# Patient Record
Sex: FEMALE | Race: WHITE | NOT HISPANIC OR LATINO | Employment: STUDENT | URBAN - METROPOLITAN AREA
[De-identification: names, ages, dates, MRNs, and addresses within clinical notes are randomized per-mention and may not be internally consistent; named-entity substitution may affect disease eponyms.]

---

## 2017-03-03 ENCOUNTER — APPOINTMENT (OUTPATIENT)
Dept: LAB | Facility: HOSPITAL | Age: 27
End: 2017-03-03

## 2017-03-03 ENCOUNTER — TRANSCRIBE ORDERS (OUTPATIENT)
Dept: LAB | Facility: HOSPITAL | Age: 27
End: 2017-03-03

## 2017-03-03 DIAGNOSIS — Z34.01 ENCOUNTER FOR SUPERVISION OF NORMAL FIRST PREGNANCY IN FIRST TRIMESTER: Primary | ICD-10-CM

## 2017-03-03 LAB
ABO GROUP BLD: NORMAL
AMPHET+METHAMPHET UR QL: NEGATIVE
AMPHETAMINES UR QL: NEGATIVE
BACTERIA UR QL AUTO: ABNORMAL /HPF
BARBITURATES UR QL SCN: NEGATIVE
BASOPHILS # BLD AUTO: 0.02 10*3/MM3 (ref 0–0.2)
BASOPHILS NFR BLD AUTO: 0.2 % (ref 0–1)
BENZODIAZ UR QL SCN: NEGATIVE
BILIRUB UR QL STRIP: NEGATIVE
BLD GP AB SCN SERPL QL: NEGATIVE
BUPRENORPHINE SERPL-MCNC: NEGATIVE NG/ML
CANNABINOIDS SERPL QL: NEGATIVE
CLARITY UR: ABNORMAL
COCAINE UR QL: NEGATIVE
COLOR UR: YELLOW
DEPRECATED RDW RBC AUTO: 39.7 FL (ref 37–54)
EOSINOPHIL # BLD AUTO: 0.04 10*3/MM3 (ref 0.1–0.3)
EOSINOPHIL NFR BLD AUTO: 0.4 % (ref 0–3)
ERYTHROCYTE [DISTWIDTH] IN BLOOD BY AUTOMATED COUNT: 12.5 % (ref 11.3–14.5)
GLUCOSE BLD-MCNC: 65 MG/DL (ref 70–100)
GLUCOSE UR STRIP-MCNC: NEGATIVE MG/DL
HBV SURFACE AG SERPL QL IA: NORMAL
HCT VFR BLD AUTO: 37.3 % (ref 34.5–44)
HCV AB SER DONR QL: NORMAL
HGB BLD-MCNC: 12.8 G/DL (ref 11.5–15.5)
HGB UR QL STRIP.AUTO: NEGATIVE
HIV1+2 AB SER QL: NORMAL
HYALINE CASTS UR QL AUTO: ABNORMAL /LPF
IMM GRANULOCYTES # BLD: 0.02 10*3/MM3 (ref 0–0.03)
IMM GRANULOCYTES NFR BLD: 0.2 % (ref 0–0.6)
KETONES UR QL STRIP: NEGATIVE
LEUKOCYTE ESTERASE UR QL STRIP.AUTO: NEGATIVE
LYMPHOCYTES # BLD AUTO: 1.79 10*3/MM3 (ref 0.6–4.8)
LYMPHOCYTES NFR BLD AUTO: 17.3 % (ref 24–44)
MCH RBC QN AUTO: 30.4 PG (ref 27–31)
MCHC RBC AUTO-ENTMCNC: 34.3 G/DL (ref 32–36)
MCV RBC AUTO: 88.6 FL (ref 80–99)
METHADONE UR QL SCN: NEGATIVE
MONOCYTES # BLD AUTO: 0.86 10*3/MM3 (ref 0–1)
MONOCYTES NFR BLD AUTO: 8.3 % (ref 0–12)
NEUTROPHILS # BLD AUTO: 7.59 10*3/MM3 (ref 1.5–8.3)
NEUTROPHILS NFR BLD AUTO: 73.6 % (ref 41–71)
NITRITE UR QL STRIP: NEGATIVE
OPIATES UR QL: NEGATIVE
OXYCODONE UR QL SCN: NEGATIVE
PCP UR QL SCN: NEGATIVE
PH UR STRIP.AUTO: 7 [PH] (ref 5–8)
PLATELET # BLD AUTO: 303 10*3/MM3 (ref 150–450)
PMV BLD AUTO: 11.2 FL (ref 6–12)
PROPOXYPH UR QL: NEGATIVE
PROT UR QL STRIP: NEGATIVE
RBC # BLD AUTO: 4.21 10*6/MM3 (ref 3.89–5.14)
RBC # UR: ABNORMAL /HPF
REF LAB TEST METHOD: ABNORMAL
RH BLD: POSITIVE
RUBV IGG SER QL: NORMAL
RUBV IGG SER-ACNC: 253.1 IU/ML
SP GR UR STRIP: 1.02 (ref 1–1.03)
SQUAMOUS #/AREA URNS HPF: ABNORMAL /HPF
TRICYCLICS UR QL SCN: NEGATIVE
UROBILINOGEN UR QL STRIP: ABNORMAL
WBC NRBC COR # BLD: 10.32 10*3/MM3 (ref 3.5–10.8)
WBC UR QL AUTO: ABNORMAL /HPF

## 2017-03-03 PROCEDURE — 81001 URINALYSIS AUTO W/SCOPE: CPT

## 2017-03-03 PROCEDURE — 86901 BLOOD TYPING SEROLOGIC RH(D): CPT | Performed by: ADVANCED PRACTICE MIDWIFE

## 2017-03-03 PROCEDURE — 36415 COLL VENOUS BLD VENIPUNCTURE: CPT

## 2017-03-03 PROCEDURE — 86900 BLOOD TYPING SEROLOGIC ABO: CPT | Performed by: ADVANCED PRACTICE MIDWIFE

## 2017-03-03 PROCEDURE — 86803 HEPATITIS C AB TEST: CPT

## 2017-03-03 PROCEDURE — 82947 ASSAY GLUCOSE BLOOD QUANT: CPT

## 2017-03-03 PROCEDURE — 87340 HEPATITIS B SURFACE AG IA: CPT

## 2017-03-03 PROCEDURE — 86850 RBC ANTIBODY SCREEN: CPT | Performed by: ADVANCED PRACTICE MIDWIFE

## 2017-03-03 PROCEDURE — G0432 EIA HIV-1/HIV-2 SCREEN: HCPCS

## 2017-03-03 PROCEDURE — 80306 DRUG TEST PRSMV INSTRMNT: CPT

## 2017-03-03 PROCEDURE — 85025 COMPLETE CBC W/AUTO DIFF WBC: CPT

## 2017-03-06 LAB — RPR SER QL: NORMAL

## 2017-04-05 ENCOUNTER — LAB REQUISITION (OUTPATIENT)
Dept: LAB | Facility: HOSPITAL | Age: 27
End: 2017-04-05

## 2017-04-05 DIAGNOSIS — O02.1 MISSED ABORTION: ICD-10-CM

## 2017-04-05 PROCEDURE — 88305 TISSUE EXAM BY PATHOLOGIST: CPT | Performed by: OBSTETRICS & GYNECOLOGY

## 2017-04-20 ENCOUNTER — APPOINTMENT (OUTPATIENT)
Dept: LAB | Facility: HOSPITAL | Age: 27
End: 2017-04-20

## 2017-04-20 ENCOUNTER — TRANSCRIBE ORDERS (OUTPATIENT)
Dept: LAB | Facility: HOSPITAL | Age: 27
End: 2017-04-20

## 2017-04-20 DIAGNOSIS — O02.1 MISSED ABORTION: Primary | ICD-10-CM

## 2017-04-20 LAB — HCG INTACT+B SERPL-ACNC: 64 MIU/ML

## 2017-04-20 PROCEDURE — 84702 CHORIONIC GONADOTROPIN TEST: CPT | Performed by: OBSTETRICS & GYNECOLOGY

## 2017-04-20 PROCEDURE — 36415 COLL VENOUS BLD VENIPUNCTURE: CPT | Performed by: OBSTETRICS & GYNECOLOGY

## 2017-04-28 ENCOUNTER — APPOINTMENT (OUTPATIENT)
Dept: LAB | Facility: HOSPITAL | Age: 27
End: 2017-04-28

## 2017-04-28 ENCOUNTER — TRANSCRIBE ORDERS (OUTPATIENT)
Dept: LAB | Facility: HOSPITAL | Age: 27
End: 2017-04-28

## 2017-04-28 DIAGNOSIS — O02.1 MISSED ABORTION: Primary | ICD-10-CM

## 2017-04-28 LAB — HCG INTACT+B SERPL-ACNC: 16 MIU/ML

## 2017-04-28 PROCEDURE — 84702 CHORIONIC GONADOTROPIN TEST: CPT | Performed by: OBSTETRICS & GYNECOLOGY

## 2017-04-28 PROCEDURE — 36415 COLL VENOUS BLD VENIPUNCTURE: CPT | Performed by: OBSTETRICS & GYNECOLOGY

## 2017-05-05 ENCOUNTER — TRANSCRIBE ORDERS (OUTPATIENT)
Dept: LAB | Facility: HOSPITAL | Age: 27
End: 2017-05-05

## 2017-05-05 ENCOUNTER — LAB (OUTPATIENT)
Dept: LAB | Facility: HOSPITAL | Age: 27
End: 2017-05-05

## 2017-05-05 DIAGNOSIS — O02.1 MISSED ABORTION: ICD-10-CM

## 2017-05-05 DIAGNOSIS — O02.1 MISSED ABORTION: Primary | ICD-10-CM

## 2017-05-05 LAB — HCG INTACT+B SERPL-ACNC: 6 MIU/ML

## 2017-05-05 PROCEDURE — 84702 CHORIONIC GONADOTROPIN TEST: CPT | Performed by: OBSTETRICS & GYNECOLOGY

## 2017-05-05 PROCEDURE — 36415 COLL VENOUS BLD VENIPUNCTURE: CPT

## 2017-05-22 LAB
LAB AP CASE REPORT: NORMAL
LAB AP CLINICAL INFORMATION: NORMAL
Lab: NORMAL
PATH REPORT.ADDENDUM SPEC: NORMAL
PATH REPORT.FINAL DX SPEC: NORMAL
PATH REPORT.GROSS SPEC: NORMAL

## 2017-06-16 ENCOUNTER — TRANSCRIBE ORDERS (OUTPATIENT)
Dept: LAB | Facility: HOSPITAL | Age: 27
End: 2017-06-16

## 2017-06-16 ENCOUNTER — LAB (OUTPATIENT)
Dept: LAB | Facility: HOSPITAL | Age: 27
End: 2017-06-16

## 2017-06-16 DIAGNOSIS — Z3A.08 8 WEEKS GESTATION OF PREGNANCY: ICD-10-CM

## 2017-06-16 DIAGNOSIS — Z3A.08 8 WEEKS GESTATION OF PREGNANCY: Primary | ICD-10-CM

## 2017-06-16 LAB
ABO GROUP BLD: NORMAL
AMPHET+METHAMPHET UR QL: NEGATIVE
AMPHETAMINES UR QL: NEGATIVE
BACTERIA UR QL AUTO: ABNORMAL /HPF
BARBITURATES UR QL SCN: NEGATIVE
BASOPHILS # BLD AUTO: 0.02 10*3/MM3 (ref 0–0.2)
BASOPHILS NFR BLD AUTO: 0.2 % (ref 0–1)
BENZODIAZ UR QL SCN: NEGATIVE
BILIRUB UR QL STRIP: NEGATIVE
BLD GP AB SCN SERPL QL: NEGATIVE
BUPRENORPHINE SERPL-MCNC: NEGATIVE NG/ML
CANNABINOIDS SERPL QL: NEGATIVE
CLARITY UR: CLEAR
COCAINE UR QL: NEGATIVE
COLOR UR: YELLOW
DEPRECATED RDW RBC AUTO: 39.7 FL (ref 37–54)
EOSINOPHIL # BLD AUTO: 0.04 10*3/MM3 (ref 0.1–0.3)
EOSINOPHIL NFR BLD AUTO: 0.4 % (ref 0–3)
ERYTHROCYTE [DISTWIDTH] IN BLOOD BY AUTOMATED COUNT: 12.1 % (ref 11.3–14.5)
GLUCOSE BLD-MCNC: 90 MG/DL (ref 70–100)
GLUCOSE UR STRIP-MCNC: NEGATIVE MG/DL
HBV SURFACE AG SERPL QL IA: NORMAL
HCG INTACT+B SERPL-ACNC: 595 MIU/ML
HCT VFR BLD AUTO: 40.3 % (ref 34.5–44)
HCV AB SER DONR QL: NORMAL
HGB BLD-MCNC: 13.2 G/DL (ref 11.5–15.5)
HGB UR QL STRIP.AUTO: NEGATIVE
HIV1+2 AB SER QL: NORMAL
HYALINE CASTS UR QL AUTO: ABNORMAL /LPF
IMM GRANULOCYTES # BLD: 0.02 10*3/MM3 (ref 0–0.03)
IMM GRANULOCYTES NFR BLD: 0.2 % (ref 0–0.6)
KETONES UR QL STRIP: NEGATIVE
LEUKOCYTE ESTERASE UR QL STRIP.AUTO: ABNORMAL
LYMPHOCYTES # BLD AUTO: 2.07 10*3/MM3 (ref 0.6–4.8)
LYMPHOCYTES NFR BLD AUTO: 18.9 % (ref 24–44)
MCH RBC QN AUTO: 29.5 PG (ref 27–31)
MCHC RBC AUTO-ENTMCNC: 32.8 G/DL (ref 32–36)
MCV RBC AUTO: 90 FL (ref 80–99)
METHADONE UR QL SCN: NEGATIVE
MONOCYTES # BLD AUTO: 0.82 10*3/MM3 (ref 0–1)
MONOCYTES NFR BLD AUTO: 7.5 % (ref 0–12)
NEUTROPHILS # BLD AUTO: 8.01 10*3/MM3 (ref 1.5–8.3)
NEUTROPHILS NFR BLD AUTO: 72.8 % (ref 41–71)
NITRITE UR QL STRIP: NEGATIVE
OPIATES UR QL: NEGATIVE
OXYCODONE UR QL SCN: NEGATIVE
PCP UR QL SCN: NEGATIVE
PH UR STRIP.AUTO: 7 [PH] (ref 5–8)
PLATELET # BLD AUTO: 275 10*3/MM3 (ref 150–450)
PMV BLD AUTO: 11.8 FL (ref 6–12)
PROGEST SERPL-MCNC: 8.61 NG/ML
PROPOXYPH UR QL: NEGATIVE
PROT UR QL STRIP: NEGATIVE
RBC # BLD AUTO: 4.48 10*6/MM3 (ref 3.89–5.14)
RBC # UR: ABNORMAL /HPF
REF LAB TEST METHOD: ABNORMAL
RH BLD: POSITIVE
RUBV IGG SER QL: NORMAL
RUBV IGG SER-ACNC: 190.5 IU/ML
SP GR UR STRIP: 1.01 (ref 1–1.03)
SQUAMOUS #/AREA URNS HPF: ABNORMAL /HPF
TRICYCLICS UR QL SCN: NEGATIVE
TSH SERPL DL<=0.05 MIU/L-ACNC: 1.28 MIU/ML (ref 0.35–5.35)
UROBILINOGEN UR QL STRIP: ABNORMAL
WBC NRBC COR # BLD: 10.98 10*3/MM3 (ref 3.5–10.8)
WBC UR QL AUTO: ABNORMAL /HPF

## 2017-06-16 PROCEDURE — 84702 CHORIONIC GONADOTROPIN TEST: CPT | Performed by: NURSE PRACTITIONER

## 2017-06-16 PROCEDURE — 87340 HEPATITIS B SURFACE AG IA: CPT | Performed by: NURSE PRACTITIONER

## 2017-06-16 PROCEDURE — 82947 ASSAY GLUCOSE BLOOD QUANT: CPT | Performed by: NURSE PRACTITIONER

## 2017-06-16 PROCEDURE — 86900 BLOOD TYPING SEROLOGIC ABO: CPT | Performed by: NURSE PRACTITIONER

## 2017-06-16 PROCEDURE — 80306 DRUG TEST PRSMV INSTRMNT: CPT | Performed by: NURSE PRACTITIONER

## 2017-06-16 PROCEDURE — 36415 COLL VENOUS BLD VENIPUNCTURE: CPT

## 2017-06-16 PROCEDURE — 86803 HEPATITIS C AB TEST: CPT | Performed by: NURSE PRACTITIONER

## 2017-06-16 PROCEDURE — 84443 ASSAY THYROID STIM HORMONE: CPT | Performed by: NURSE PRACTITIONER

## 2017-06-16 PROCEDURE — 80081 OBSTETRIC PANEL INC HIV TSTG: CPT | Performed by: NURSE PRACTITIONER

## 2017-06-16 PROCEDURE — 85025 COMPLETE CBC W/AUTO DIFF WBC: CPT | Performed by: NURSE PRACTITIONER

## 2017-06-16 PROCEDURE — 86901 BLOOD TYPING SEROLOGIC RH(D): CPT | Performed by: NURSE PRACTITIONER

## 2017-06-16 PROCEDURE — 81001 URINALYSIS AUTO W/SCOPE: CPT | Performed by: NURSE PRACTITIONER

## 2017-06-16 PROCEDURE — 86850 RBC ANTIBODY SCREEN: CPT | Performed by: NURSE PRACTITIONER

## 2017-06-16 PROCEDURE — 84144 ASSAY OF PROGESTERONE: CPT | Performed by: NURSE PRACTITIONER

## 2017-06-16 PROCEDURE — 87086 URINE CULTURE/COLONY COUNT: CPT | Performed by: NURSE PRACTITIONER

## 2017-06-16 PROCEDURE — G0432 EIA HIV-1/HIV-2 SCREEN: HCPCS | Performed by: NURSE PRACTITIONER

## 2017-06-18 ENCOUNTER — TRANSCRIBE ORDERS (OUTPATIENT)
Dept: LAB | Facility: HOSPITAL | Age: 27
End: 2017-06-18

## 2017-06-18 ENCOUNTER — APPOINTMENT (OUTPATIENT)
Dept: LAB | Facility: HOSPITAL | Age: 27
End: 2017-06-18

## 2017-06-18 DIAGNOSIS — Z00.00 ROUTINE GENERAL MEDICAL EXAMINATION AT A HEALTH CARE FACILITY: Primary | ICD-10-CM

## 2017-06-18 LAB
BACTERIA SPEC AEROBE CULT: NORMAL
HCG INTACT+B SERPL-ACNC: 1342 MIU/ML

## 2017-06-18 PROCEDURE — 84702 CHORIONIC GONADOTROPIN TEST: CPT | Performed by: NURSE PRACTITIONER

## 2017-06-18 PROCEDURE — 36415 COLL VENOUS BLD VENIPUNCTURE: CPT | Performed by: NURSE PRACTITIONER

## 2017-06-19 LAB — RPR SER QL: NORMAL

## 2017-08-02 ENCOUNTER — TRANSCRIBE ORDERS (OUTPATIENT)
Dept: WOMENS IMAGING | Facility: HOSPITAL | Age: 27
End: 2017-08-02

## 2017-08-02 DIAGNOSIS — O30.031 MONOCHORIONIC DIAMNIOTIC TWIN GESTATION IN FIRST TRIMESTER: ICD-10-CM

## 2017-08-02 DIAGNOSIS — IMO0001 TWINS: Primary | ICD-10-CM

## 2017-08-09 ENCOUNTER — OFFICE VISIT (OUTPATIENT)
Dept: OBSTETRICS AND GYNECOLOGY | Facility: HOSPITAL | Age: 27
End: 2017-08-09

## 2017-08-09 ENCOUNTER — HOSPITAL ENCOUNTER (OUTPATIENT)
Dept: WOMENS IMAGING | Facility: HOSPITAL | Age: 27
Discharge: HOME | End: 2017-08-09
Attending: OBSTETRICS & GYNECOLOGY | Admitting: OBSTETRICS & GYNECOLOGY

## 2017-08-09 VITALS
WEIGHT: 106 LBS | DIASTOLIC BLOOD PRESSURE: 72 MMHG | BODY MASS INDEX: 19.51 KG/M2 | SYSTOLIC BLOOD PRESSURE: 103 MMHG | HEIGHT: 62 IN

## 2017-08-09 DIAGNOSIS — O28.3 INCREASED NUCHAL TRANSLUCENCY SPACE ON FETAL ULTRASOUND: Primary | ICD-10-CM

## 2017-08-09 DIAGNOSIS — O30.039 MONOCHORIONIC DIAMNIOTIC TWIN PREGNANCY, ANTEPARTUM: ICD-10-CM

## 2017-08-09 DIAGNOSIS — IMO0001 TWINS: ICD-10-CM

## 2017-08-09 DIAGNOSIS — O30.031 MONOCHORIONIC DIAMNIOTIC TWIN GESTATION IN FIRST TRIMESTER: ICD-10-CM

## 2017-08-09 PROCEDURE — 76802 OB US < 14 WKS ADDL FETUS: CPT | Performed by: OBSTETRICS & GYNECOLOGY

## 2017-08-09 PROCEDURE — 76814 OB US NUCHAL MEAS ADD-ON: CPT

## 2017-08-09 PROCEDURE — 76813 OB US NUCHAL MEAS 1 GEST: CPT

## 2017-08-09 PROCEDURE — 99241 PR OFFICE CONSULTATION NEW/ESTAB PATIENT 15 MIN: CPT | Performed by: OBSTETRICS & GYNECOLOGY

## 2017-08-09 PROCEDURE — 76814 OB US NUCHAL MEAS ADD-ON: CPT | Performed by: OBSTETRICS & GYNECOLOGY

## 2017-08-09 PROCEDURE — 76801 OB US < 14 WKS SINGLE FETUS: CPT

## 2017-08-09 PROCEDURE — 76802 OB US < 14 WKS ADDL FETUS: CPT

## 2017-08-09 PROCEDURE — 76801 OB US < 14 WKS SINGLE FETUS: CPT | Performed by: OBSTETRICS & GYNECOLOGY

## 2017-08-09 PROCEDURE — 76813 OB US NUCHAL MEAS 1 GEST: CPT | Performed by: OBSTETRICS & GYNECOLOGY

## 2017-08-09 RX ORDER — PRENATAL WITH FERROUS FUM AND FOLIC ACID 3080; 920; 120; 400; 22; 1.84; 3; 20; 10; 1; 12; 200; 27; 25; 2 [IU]/1; [IU]/1; MG/1; [IU]/1; MG/1; MG/1; MG/1; MG/1; MG/1; MG/1; UG/1; MG/1; MG/1; MG/1; MG/1
TABLET ORAL DAILY
COMMUNITY
End: 2021-09-20

## 2017-09-06 ENCOUNTER — HOSPITAL ENCOUNTER (OUTPATIENT)
Dept: WOMENS IMAGING | Facility: HOSPITAL | Age: 27
Discharge: HOME | End: 2017-09-06
Attending: OBSTETRICS & GYNECOLOGY | Admitting: OBSTETRICS & GYNECOLOGY

## 2017-09-06 ENCOUNTER — OFFICE VISIT (OUTPATIENT)
Dept: OBSTETRICS AND GYNECOLOGY | Facility: HOSPITAL | Age: 27
End: 2017-09-06

## 2017-09-06 VITALS — BODY MASS INDEX: 19.9 KG/M2 | WEIGHT: 108.8 LBS | DIASTOLIC BLOOD PRESSURE: 67 MMHG | SYSTOLIC BLOOD PRESSURE: 107 MMHG

## 2017-09-06 DIAGNOSIS — O30.039 MONOCHORIONIC DIAMNIOTIC TWIN PREGNANCY, ANTEPARTUM: Primary | ICD-10-CM

## 2017-09-06 DIAGNOSIS — O28.3 INCREASED NUCHAL TRANSLUCENCY SPACE ON FETAL ULTRASOUND: ICD-10-CM

## 2017-09-06 DIAGNOSIS — Q07.9 CNS ANOMALY (HCC): ICD-10-CM

## 2017-09-06 DIAGNOSIS — O30.039 MONOCHORIONIC DIAMNIOTIC TWIN PREGNANCY, ANTEPARTUM: ICD-10-CM

## 2017-09-06 PROCEDURE — 99213 OFFICE O/P EST LOW 20 MIN: CPT | Performed by: OBSTETRICS & GYNECOLOGY

## 2017-09-06 PROCEDURE — 76816 OB US FOLLOW-UP PER FETUS: CPT

## 2017-09-06 PROCEDURE — 76816 OB US FOLLOW-UP PER FETUS: CPT | Performed by: OBSTETRICS & GYNECOLOGY

## 2017-09-06 NOTE — PROGRESS NOTES
Documentation of the ultrasound findings, images, and interpretations as well as consultation notes will be available in the patient's ViewPoint report located in the chart review imaging tab in [facility] [employer].

## 2017-09-06 NOTE — PROGRESS NOTES
Denies problems. To see Dr. Chavez 9/19/2017. Denies any bleeding; reports occasional mild cramping. Increased NT noted for Twin A on previous scan; declined genetic screening.

## 2017-10-04 ENCOUNTER — HOSPITAL ENCOUNTER (OUTPATIENT)
Dept: WOMENS IMAGING | Facility: HOSPITAL | Age: 27
Discharge: HOME | End: 2017-10-04
Attending: OBSTETRICS & GYNECOLOGY | Admitting: OBSTETRICS & GYNECOLOGY

## 2017-10-04 ENCOUNTER — OFFICE VISIT (OUTPATIENT)
Dept: OBSTETRICS AND GYNECOLOGY | Facility: HOSPITAL | Age: 27
End: 2017-10-04

## 2017-10-04 VITALS — DIASTOLIC BLOOD PRESSURE: 69 MMHG | BODY MASS INDEX: 20.67 KG/M2 | WEIGHT: 113 LBS | SYSTOLIC BLOOD PRESSURE: 110 MMHG

## 2017-10-04 DIAGNOSIS — O30.039 MONOCHORIONIC DIAMNIOTIC TWIN PREGNANCY, ANTEPARTUM: Primary | ICD-10-CM

## 2017-10-04 DIAGNOSIS — Q07.9 CNS ANOMALY (HCC): ICD-10-CM

## 2017-10-04 DIAGNOSIS — O30.039 MONOCHORIONIC DIAMNIOTIC TWIN PREGNANCY, ANTEPARTUM: ICD-10-CM

## 2017-10-04 DIAGNOSIS — O28.3 INCREASED NUCHAL TRANSLUCENCY SPACE ON FETAL ULTRASOUND: ICD-10-CM

## 2017-10-04 PROCEDURE — 76816 OB US FOLLOW-UP PER FETUS: CPT

## 2017-10-04 PROCEDURE — 76816 OB US FOLLOW-UP PER FETUS: CPT | Performed by: OBSTETRICS & GYNECOLOGY

## 2017-11-01 ENCOUNTER — HOSPITAL ENCOUNTER (OUTPATIENT)
Dept: WOMENS IMAGING | Facility: HOSPITAL | Age: 27
Discharge: HOME | End: 2017-11-01
Attending: OBSTETRICS & GYNECOLOGY | Admitting: OBSTETRICS & GYNECOLOGY

## 2017-11-01 ENCOUNTER — OFFICE VISIT (OUTPATIENT)
Dept: OBSTETRICS AND GYNECOLOGY | Facility: HOSPITAL | Age: 27
End: 2017-11-01

## 2017-11-01 VITALS — BODY MASS INDEX: 21.62 KG/M2 | WEIGHT: 118.2 LBS | SYSTOLIC BLOOD PRESSURE: 118 MMHG | DIASTOLIC BLOOD PRESSURE: 59 MMHG

## 2017-11-01 DIAGNOSIS — O28.3 INCREASED NUCHAL TRANSLUCENCY SPACE ON FETAL ULTRASOUND: ICD-10-CM

## 2017-11-01 DIAGNOSIS — Q07.9 CNS ANOMALY (HCC): ICD-10-CM

## 2017-11-01 DIAGNOSIS — O30.039 MONOCHORIONIC DIAMNIOTIC TWIN PREGNANCY, ANTEPARTUM: Primary | ICD-10-CM

## 2017-11-01 DIAGNOSIS — O30.039 MONOCHORIONIC DIAMNIOTIC TWIN PREGNANCY, ANTEPARTUM: ICD-10-CM

## 2017-11-01 PROCEDURE — 76825 ECHO EXAM OF FETAL HEART: CPT | Performed by: OBSTETRICS & GYNECOLOGY

## 2017-11-01 PROCEDURE — 93325 DOPPLER ECHO COLOR FLOW MAPG: CPT

## 2017-11-01 PROCEDURE — 76825 ECHO EXAM OF FETAL HEART: CPT

## 2017-11-01 PROCEDURE — 76816 OB US FOLLOW-UP PER FETUS: CPT | Performed by: OBSTETRICS & GYNECOLOGY

## 2017-11-01 PROCEDURE — 76816 OB US FOLLOW-UP PER FETUS: CPT

## 2017-11-01 PROCEDURE — 93325 DOPPLER ECHO COLOR FLOW MAPG: CPT | Performed by: OBSTETRICS & GYNECOLOGY

## 2017-11-29 ENCOUNTER — TRANSCRIBE ORDERS (OUTPATIENT)
Dept: LAB | Facility: HOSPITAL | Age: 27
End: 2017-11-29

## 2017-11-29 ENCOUNTER — LAB (OUTPATIENT)
Dept: LAB | Facility: HOSPITAL | Age: 27
End: 2017-11-29

## 2017-11-29 ENCOUNTER — OFFICE VISIT (OUTPATIENT)
Dept: OBSTETRICS AND GYNECOLOGY | Facility: HOSPITAL | Age: 27
End: 2017-11-29

## 2017-11-29 ENCOUNTER — HOSPITAL ENCOUNTER (OUTPATIENT)
Dept: WOMENS IMAGING | Facility: HOSPITAL | Age: 27
Discharge: HOME | End: 2017-11-29
Attending: OBSTETRICS & GYNECOLOGY | Admitting: OBSTETRICS & GYNECOLOGY

## 2017-11-29 VITALS — SYSTOLIC BLOOD PRESSURE: 100 MMHG | BODY MASS INDEX: 22.72 KG/M2 | DIASTOLIC BLOOD PRESSURE: 64 MMHG | WEIGHT: 124.2 LBS

## 2017-11-29 DIAGNOSIS — Z34.83 PRENATAL CARE, SUBSEQUENT PREGNANCY, THIRD TRIMESTER: ICD-10-CM

## 2017-11-29 DIAGNOSIS — O30.039 MONOCHORIONIC DIAMNIOTIC TWIN PREGNANCY, ANTEPARTUM: ICD-10-CM

## 2017-11-29 DIAGNOSIS — O28.3 INCREASED NUCHAL TRANSLUCENCY SPACE ON FETAL ULTRASOUND: ICD-10-CM

## 2017-11-29 DIAGNOSIS — O30.039 MONOCHORIONIC DIAMNIOTIC TWIN PREGNANCY, ANTEPARTUM: Primary | ICD-10-CM

## 2017-11-29 DIAGNOSIS — Z3A.28 28 WEEKS GESTATION OF PREGNANCY: ICD-10-CM

## 2017-11-29 DIAGNOSIS — Z3A.28 28 WEEKS GESTATION OF PREGNANCY: Primary | ICD-10-CM

## 2017-11-29 LAB
BLD GP AB SCN SERPL QL: NEGATIVE
DEPRECATED RDW RBC AUTO: 43 FL (ref 37–54)
ERYTHROCYTE [DISTWIDTH] IN BLOOD BY AUTOMATED COUNT: 13.2 % (ref 11.3–14.5)
GLUCOSE 1H P 100 G GLC PO SERPL-MCNC: 110 MG/DL (ref 65–199)
GLUCOSE BLDC GLUCOMTR-MCNC: 117 MG/DL
HCT VFR BLD AUTO: 35.2 % (ref 34.5–44)
HGB BLD-MCNC: 11.6 G/DL (ref 11.5–15.5)
MCH RBC QN AUTO: 29.6 PG (ref 27–31)
MCHC RBC AUTO-ENTMCNC: 33 G/DL (ref 32–36)
MCV RBC AUTO: 89.8 FL (ref 80–99)
PLATELET # BLD AUTO: 247 10*3/MM3 (ref 150–450)
PMV BLD AUTO: 11.5 FL (ref 6–12)
RBC # BLD AUTO: 3.92 10*6/MM3 (ref 3.89–5.14)
WBC NRBC COR # BLD: 11.69 10*3/MM3 (ref 3.5–10.8)

## 2017-11-29 PROCEDURE — 82962 GLUCOSE BLOOD TEST: CPT | Performed by: OBSTETRICS & GYNECOLOGY

## 2017-11-29 PROCEDURE — 36415 COLL VENOUS BLD VENIPUNCTURE: CPT

## 2017-11-29 PROCEDURE — 76816 OB US FOLLOW-UP PER FETUS: CPT | Performed by: OBSTETRICS & GYNECOLOGY

## 2017-11-29 PROCEDURE — 76816 OB US FOLLOW-UP PER FETUS: CPT

## 2017-11-29 PROCEDURE — 86850 RBC ANTIBODY SCREEN: CPT

## 2017-11-29 PROCEDURE — 82950 GLUCOSE TEST: CPT

## 2017-11-29 PROCEDURE — 85027 COMPLETE CBC AUTOMATED: CPT

## 2017-12-27 ENCOUNTER — APPOINTMENT (OUTPATIENT)
Dept: WOMENS IMAGING | Facility: HOSPITAL | Age: 27
End: 2017-12-27
Attending: OBSTETRICS & GYNECOLOGY

## 2017-12-28 ENCOUNTER — HOSPITAL ENCOUNTER (OUTPATIENT)
Dept: WOMENS IMAGING | Facility: HOSPITAL | Age: 27
Discharge: HOME | End: 2017-12-28
Attending: OBSTETRICS & GYNECOLOGY | Admitting: OBSTETRICS & GYNECOLOGY

## 2017-12-28 ENCOUNTER — APPOINTMENT (OUTPATIENT)
Dept: WOMENS IMAGING | Facility: HOSPITAL | Age: 27
End: 2017-12-28
Attending: OBSTETRICS & GYNECOLOGY

## 2017-12-28 ENCOUNTER — OFFICE VISIT (OUTPATIENT)
Dept: OBSTETRICS AND GYNECOLOGY | Facility: HOSPITAL | Age: 27
End: 2017-12-28

## 2017-12-28 VITALS — BODY MASS INDEX: 23.59 KG/M2 | DIASTOLIC BLOOD PRESSURE: 69 MMHG | SYSTOLIC BLOOD PRESSURE: 105 MMHG | WEIGHT: 129 LBS

## 2017-12-28 DIAGNOSIS — O28.3 INCREASED NUCHAL TRANSLUCENCY SPACE ON FETAL ULTRASOUND: ICD-10-CM

## 2017-12-28 DIAGNOSIS — O30.039 MONOCHORIONIC DIAMNIOTIC TWIN PREGNANCY, ANTEPARTUM: ICD-10-CM

## 2017-12-28 DIAGNOSIS — O30.039 MONOCHORIONIC DIAMNIOTIC TWIN PREGNANCY, ANTEPARTUM: Primary | ICD-10-CM

## 2017-12-28 PROCEDURE — 76816 OB US FOLLOW-UP PER FETUS: CPT

## 2017-12-28 PROCEDURE — 76816 OB US FOLLOW-UP PER FETUS: CPT | Performed by: OBSTETRICS & GYNECOLOGY

## 2017-12-28 RX ORDER — RANITIDINE HCL 75 MG
75 TABLET ORAL 2 TIMES DAILY
COMMUNITY
End: 2017-12-31 | Stop reason: HOSPADM

## 2017-12-28 RX ORDER — ACETAMINOPHEN 325 MG/1
650 TABLET ORAL EVERY 6 HOURS PRN
COMMUNITY
End: 2020-10-23 | Stop reason: HOSPADM

## 2017-12-28 NOTE — PROGRESS NOTES
Documentation of the ultasound findings, images, and interpretations with be available in the patient's Viewpoint report located in the Chart Review Imaging tab in [employer].

## 2017-12-28 NOTE — PROGRESS NOTES
"Pt has noticed an increased vag discharge with no odor/itching. Denies vag bleeding. Reports occasional \"round ligament pain\". Pt decided to not have genetic testing.   "

## 2017-12-29 ENCOUNTER — ANESTHESIA EVENT (OUTPATIENT)
Dept: LABOR AND DELIVERY | Facility: HOSPITAL | Age: 27
End: 2017-12-29

## 2017-12-29 ENCOUNTER — ANESTHESIA (OUTPATIENT)
Dept: LABOR AND DELIVERY | Facility: HOSPITAL | Age: 27
End: 2017-12-29

## 2017-12-29 ENCOUNTER — HOSPITAL ENCOUNTER (INPATIENT)
Facility: HOSPITAL | Age: 27
LOS: 2 days | Discharge: HOME | End: 2017-12-31
Attending: OBSTETRICS & GYNECOLOGY | Admitting: OBSTETRICS & GYNECOLOGY

## 2017-12-29 DIAGNOSIS — O30.039 MONOCHORIONIC DIAMNIOTIC TWIN PREGNANCY, ANTEPARTUM: ICD-10-CM

## 2017-12-29 DIAGNOSIS — Z3A.32 PREGNANCY WITH 32 COMPLETED WEEKS GESTATION: Primary | ICD-10-CM

## 2017-12-29 LAB
ABO GROUP BLD: NORMAL
ALP SERPL-CCNC: 225 U/L (ref 25–100)
ALT SERPL W P-5'-P-CCNC: 18 U/L (ref 7–40)
AST SERPL-CCNC: 40 U/L (ref 0–33)
ATMOSPHERIC PRESS: ABNORMAL MMHG
ATMOSPHERIC PRESS: ABNORMAL MMHG
BASE EXCESS BLDCOA CALC-SCNC: -5.9 MMOL/L (ref 0–2)
BASE EXCESS BLDCOV CALC-SCNC: -5.8 MMOL/L (ref 0–2)
BDY SITE: ABNORMAL
BILIRUB SERPL-MCNC: 0.6 MG/DL (ref 0.3–1.2)
BLD GP AB SCN SERPL QL: NEGATIVE
CO2 BLDA-SCNC: 23.2 MMOL/L (ref 22–33)
CO2 BLDA-SCNC: 23.3 MMOL/L (ref 22–33)
CREAT BLD-MCNC: 0.9 MG/DL (ref 0.6–1.3)
DEPRECATED RDW RBC AUTO: 43.3 FL (ref 37–54)
ERYTHROCYTE [DISTWIDTH] IN BLOOD BY AUTOMATED COUNT: 13.4 % (ref 11.3–14.5)
FLUAV AG NPH QL: POSITIVE
FLUBV AG NPH QL IA: NEGATIVE
HCO3 BLDCOA-SCNC: 21.7 MMOL/L (ref 16.9–20.5)
HCO3 BLDCOV-SCNC: 21.7 MMOL/L (ref 18.6–21.4)
HCT VFR BLD AUTO: 34.4 % (ref 34.5–44)
HGB BLD-MCNC: 11.7 G/DL (ref 11.5–15.5)
HGB BLDA-MCNC: 13.5 G/DL (ref 14–18)
HGB BLDA-MCNC: 15.3 G/DL (ref 14–18)
HOROWITZ INDEX BLD+IHG-RTO: 21 %
HOROWITZ INDEX BLD+IHG-RTO: 21 %
LDH SERPL-CCNC: 411 U/L (ref 120–246)
MCH RBC QN AUTO: 30.3 PG (ref 27–31)
MCHC RBC AUTO-ENTMCNC: 34 G/DL (ref 32–36)
MCV RBC AUTO: 89.1 FL (ref 80–99)
MODALITY: ABNORMAL
MODALITY: ABNORMAL
PCO2 BLDCOA: 50 MMHG (ref 43.3–54.9)
PCO2 BLDCOV: 48.9 MM HG (ref 30–60)
PH BLDCOA: 7.25 PH UNITS (ref 7.2–7.3)
PH BLDCOV: 7.26 PH UNITS (ref 7.19–7.46)
PLATELET # BLD AUTO: 204 10*3/MM3 (ref 150–450)
PMV BLD AUTO: 12.8 FL (ref 6–12)
PO2 BLDCOA: 23.3 MMHG (ref 11.5–43.3)
PO2 BLDCOV: 21.9 MM HG
RBC # BLD AUTO: 3.86 10*6/MM3 (ref 3.89–5.14)
RH BLD: POSITIVE
SAO2 % BLDCOA: 47.6 %
SAO2 % BLDCOA: ABNORMAL % (ref 45–75)
SAO2 % BLDCOV: 42.3 %
URATE SERPL-MCNC: 8.7 MG/DL (ref 3.1–7.8)
WBC NRBC COR # BLD: 9.52 10*3/MM3 (ref 3.5–10.8)

## 2017-12-29 PROCEDURE — 84460 ALANINE AMINO (ALT) (SGPT): CPT | Performed by: OBSTETRICS & GYNECOLOGY

## 2017-12-29 PROCEDURE — 25010000002 FENTANYL CITRATE (PF) 100 MCG/2ML SOLUTION: Performed by: NURSE ANESTHETIST, CERTIFIED REGISTERED

## 2017-12-29 PROCEDURE — 25010000003 CEFAZOLIN IN DEXTROSE 2-4 GM/100ML-% SOLUTION: Performed by: OBSTETRICS & GYNECOLOGY

## 2017-12-29 PROCEDURE — 87081 CULTURE SCREEN ONLY: CPT | Performed by: OBSTETRICS & GYNECOLOGY

## 2017-12-29 PROCEDURE — 84550 ASSAY OF BLOOD/URIC ACID: CPT | Performed by: OBSTETRICS & GYNECOLOGY

## 2017-12-29 PROCEDURE — 25010000002 PHENYLEPHRINE PER 1 ML: Performed by: NURSE ANESTHETIST, CERTIFIED REGISTERED

## 2017-12-29 PROCEDURE — 25010000003 MORPHINE PER 10 MG: Performed by: NURSE ANESTHETIST, CERTIFIED REGISTERED

## 2017-12-29 PROCEDURE — 59025 FETAL NON-STRESS TEST: CPT | Performed by: OBSTETRICS & GYNECOLOGY

## 2017-12-29 PROCEDURE — 25010000002 BETAMETHASONE ACET & SOD PHOS PER 4 MG: Performed by: OBSTETRICS & GYNECOLOGY

## 2017-12-29 PROCEDURE — 87804 INFLUENZA ASSAY W/OPTIC: CPT | Performed by: OBSTETRICS & GYNECOLOGY

## 2017-12-29 PROCEDURE — 25010000002 ONDANSETRON PER 1 MG: Performed by: OBSTETRICS & GYNECOLOGY

## 2017-12-29 PROCEDURE — 84450 TRANSFERASE (AST) (SGOT): CPT | Performed by: OBSTETRICS & GYNECOLOGY

## 2017-12-29 PROCEDURE — 82805 BLOOD GASES W/O2 SATURATION: CPT | Performed by: OBSTETRICS & GYNECOLOGY

## 2017-12-29 PROCEDURE — 25010000002 MAGNESIUM SULFATE-LACT RINGERS 40 GM/580ML SOLUTION: Performed by: OBSTETRICS & GYNECOLOGY

## 2017-12-29 PROCEDURE — 82247 BILIRUBIN TOTAL: CPT | Performed by: OBSTETRICS & GYNECOLOGY

## 2017-12-29 PROCEDURE — 59025 FETAL NON-STRESS TEST: CPT

## 2017-12-29 PROCEDURE — 86900 BLOOD TYPING SEROLOGIC ABO: CPT | Performed by: OBSTETRICS & GYNECOLOGY

## 2017-12-29 PROCEDURE — 25010000002 METOCLOPRAMIDE PER 10 MG: Performed by: NURSE ANESTHETIST, CERTIFIED REGISTERED

## 2017-12-29 PROCEDURE — 86901 BLOOD TYPING SEROLOGIC RH(D): CPT | Performed by: OBSTETRICS & GYNECOLOGY

## 2017-12-29 PROCEDURE — 99222 1ST HOSP IP/OBS MODERATE 55: CPT | Performed by: OBSTETRICS & GYNECOLOGY

## 2017-12-29 PROCEDURE — 83615 LACTATE (LD) (LDH) ENZYME: CPT | Performed by: OBSTETRICS & GYNECOLOGY

## 2017-12-29 PROCEDURE — 99253 IP/OBS CNSLTJ NEW/EST LOW 45: CPT | Performed by: OBSTETRICS & GYNECOLOGY

## 2017-12-29 PROCEDURE — 25010000002 METOCLOPRAMIDE PER 10 MG: Performed by: OBSTETRICS & GYNECOLOGY

## 2017-12-29 PROCEDURE — 25010000002 MIDAZOLAM PER 1 MG: Performed by: NURSE ANESTHETIST, CERTIFIED REGISTERED

## 2017-12-29 PROCEDURE — 84075 ASSAY ALKALINE PHOSPHATASE: CPT | Performed by: OBSTETRICS & GYNECOLOGY

## 2017-12-29 PROCEDURE — 86850 RBC ANTIBODY SCREEN: CPT | Performed by: OBSTETRICS & GYNECOLOGY

## 2017-12-29 PROCEDURE — 88307 TISSUE EXAM BY PATHOLOGIST: CPT | Performed by: OBSTETRICS & GYNECOLOGY

## 2017-12-29 PROCEDURE — 82565 ASSAY OF CREATININE: CPT | Performed by: OBSTETRICS & GYNECOLOGY

## 2017-12-29 PROCEDURE — 85027 COMPLETE CBC AUTOMATED: CPT | Performed by: OBSTETRICS & GYNECOLOGY

## 2017-12-29 RX ORDER — ONDANSETRON 2 MG/ML
4 INJECTION INTRAMUSCULAR; INTRAVENOUS EVERY 8 HOURS PRN
Status: DISCONTINUED | OUTPATIENT
Start: 2017-12-29 | End: 2017-12-29 | Stop reason: HOSPADM

## 2017-12-29 RX ORDER — HYDROCODONE BITARTRATE AND ACETAMINOPHEN 5; 325 MG/1; MG/1
1 TABLET ORAL EVERY 4 HOURS PRN
Status: DISCONTINUED | OUTPATIENT
Start: 2017-12-29 | End: 2017-12-31 | Stop reason: HOSPADM

## 2017-12-29 RX ORDER — OSELTAMIVIR PHOSPHATE 75 MG/1
75 CAPSULE ORAL EVERY 12 HOURS SCHEDULED
Status: DISCONTINUED | OUTPATIENT
Start: 2017-12-29 | End: 2017-12-31 | Stop reason: HOSPADM

## 2017-12-29 RX ORDER — ONDANSETRON 2 MG/ML
4 INJECTION INTRAMUSCULAR; INTRAVENOUS EVERY 6 HOURS PRN
Status: DISCONTINUED | OUTPATIENT
Start: 2017-12-29 | End: 2017-12-31 | Stop reason: HOSPADM

## 2017-12-29 RX ORDER — NALOXONE HCL 0.4 MG/ML
0.4 VIAL (ML) INJECTION
Status: ACTIVE | OUTPATIENT
Start: 2017-12-29 | End: 2017-12-30

## 2017-12-29 RX ORDER — IBUPROFEN 600 MG/1
600 TABLET ORAL EVERY 6 HOURS PRN
Status: DISCONTINUED | OUTPATIENT
Start: 2017-12-29 | End: 2017-12-31 | Stop reason: HOSPADM

## 2017-12-29 RX ORDER — DIPHENHYDRAMINE HYDROCHLORIDE 50 MG/ML
25 INJECTION INTRAMUSCULAR; INTRAVENOUS EVERY 4 HOURS PRN
Status: DISCONTINUED | OUTPATIENT
Start: 2017-12-29 | End: 2017-12-31 | Stop reason: HOSPADM

## 2017-12-29 RX ORDER — ONDANSETRON 2 MG/ML
4 INJECTION INTRAMUSCULAR; INTRAVENOUS ONCE AS NEEDED
Status: DISCONTINUED | OUTPATIENT
Start: 2017-12-29 | End: 2017-12-31 | Stop reason: HOSPADM

## 2017-12-29 RX ORDER — DIPHENHYDRAMINE HCL 25 MG
25 CAPSULE ORAL EVERY 4 HOURS PRN
Status: DISCONTINUED | OUTPATIENT
Start: 2017-12-29 | End: 2017-12-31 | Stop reason: HOSPADM

## 2017-12-29 RX ORDER — MIDAZOLAM HYDROCHLORIDE 1 MG/ML
INJECTION INTRAMUSCULAR; INTRAVENOUS AS NEEDED
Status: DISCONTINUED | OUTPATIENT
Start: 2017-12-29 | End: 2017-12-29 | Stop reason: SURG

## 2017-12-29 RX ORDER — PRENATAL VIT/IRON FUM/FOLIC AC 27MG-0.8MG
1 TABLET ORAL DAILY
Status: DISCONTINUED | OUTPATIENT
Start: 2017-12-29 | End: 2017-12-31 | Stop reason: HOSPADM

## 2017-12-29 RX ORDER — FAMOTIDINE 20 MG/1
20 TABLET, FILM COATED ORAL 2 TIMES DAILY
Status: DISCONTINUED | OUTPATIENT
Start: 2017-12-29 | End: 2017-12-29 | Stop reason: HOSPADM

## 2017-12-29 RX ORDER — OXYCODONE AND ACETAMINOPHEN 7.5; 325 MG/1; MG/1
1 TABLET ORAL EVERY 4 HOURS PRN
Status: DISCONTINUED | OUTPATIENT
Start: 2017-12-29 | End: 2017-12-31 | Stop reason: HOSPADM

## 2017-12-29 RX ORDER — CEFAZOLIN SODIUM 2 G/100ML
2 INJECTION, SOLUTION INTRAVENOUS ONCE
Status: COMPLETED | OUTPATIENT
Start: 2017-12-29 | End: 2017-12-29

## 2017-12-29 RX ORDER — LIDOCAINE HYDROCHLORIDE 10 MG/ML
5 INJECTION, SOLUTION INFILTRATION; PERINEURAL AS NEEDED
Status: DISCONTINUED | OUTPATIENT
Start: 2017-12-29 | End: 2017-12-29 | Stop reason: HOSPADM

## 2017-12-29 RX ORDER — ACETAMINOPHEN 325 MG/1
650 TABLET ORAL EVERY 4 HOURS PRN
Status: DISCONTINUED | OUTPATIENT
Start: 2017-12-29 | End: 2017-12-31 | Stop reason: HOSPADM

## 2017-12-29 RX ORDER — NALOXONE HCL 0.4 MG/ML
0.4 VIAL (ML) INJECTION
Status: DISCONTINUED | OUTPATIENT
Start: 2017-12-29 | End: 2017-12-29 | Stop reason: HOSPADM

## 2017-12-29 RX ORDER — BETAMETHASONE SODIUM PHOSPHATE AND BETAMETHASONE ACETATE 3; 3 MG/ML; MG/ML
12 INJECTION, SUSPENSION INTRA-ARTICULAR; INTRALESIONAL; INTRAMUSCULAR; SOFT TISSUE EVERY 24 HOURS
Status: DISCONTINUED | OUTPATIENT
Start: 2017-12-29 | End: 2017-12-29 | Stop reason: HOSPADM

## 2017-12-29 RX ORDER — DEXTROSE AND SODIUM CHLORIDE 5; .2 G/100ML; G/100ML
100 INJECTION, SOLUTION INTRAVENOUS CONTINUOUS
Status: DISCONTINUED | OUTPATIENT
Start: 2017-12-29 | End: 2017-12-30

## 2017-12-29 RX ORDER — SODIUM CHLORIDE, SODIUM LACTATE, POTASSIUM CHLORIDE, CALCIUM CHLORIDE 600; 310; 30; 20 MG/100ML; MG/100ML; MG/100ML; MG/100ML
125 INJECTION, SOLUTION INTRAVENOUS CONTINUOUS
Status: DISCONTINUED | OUTPATIENT
Start: 2017-12-29 | End: 2017-12-29

## 2017-12-29 RX ORDER — ZOLPIDEM TARTRATE 5 MG/1
5 TABLET ORAL NIGHTLY PRN
Status: DISCONTINUED | OUTPATIENT
Start: 2017-12-29 | End: 2017-12-29 | Stop reason: HOSPADM

## 2017-12-29 RX ORDER — FAMOTIDINE 10 MG/ML
INJECTION, SOLUTION INTRAVENOUS AS NEEDED
Status: DISCONTINUED | OUTPATIENT
Start: 2017-12-29 | End: 2017-12-29 | Stop reason: SURG

## 2017-12-29 RX ORDER — SODIUM CHLORIDE 0.9 % (FLUSH) 0.9 %
1-10 SYRINGE (ML) INJECTION AS NEEDED
Status: DISCONTINUED | OUTPATIENT
Start: 2017-12-29 | End: 2017-12-31 | Stop reason: HOSPADM

## 2017-12-29 RX ORDER — AZITHROMYCIN 250 MG/1
1000 TABLET, FILM COATED ORAL ONCE
Status: DISCONTINUED | OUTPATIENT
Start: 2017-12-29 | End: 2017-12-29

## 2017-12-29 RX ORDER — METOCLOPRAMIDE HYDROCHLORIDE 5 MG/ML
INJECTION INTRAMUSCULAR; INTRAVENOUS AS NEEDED
Status: DISCONTINUED | OUTPATIENT
Start: 2017-12-29 | End: 2017-12-29 | Stop reason: SURG

## 2017-12-29 RX ORDER — ACETAMINOPHEN 500 MG
1000 TABLET ORAL EVERY 4 HOURS PRN
Status: DISCONTINUED | OUTPATIENT
Start: 2017-12-29 | End: 2017-12-29 | Stop reason: HOSPADM

## 2017-12-29 RX ORDER — TRISODIUM CITRATE DIHYDRATE AND CITRIC ACID MONOHYDRATE 500; 334 MG/5ML; MG/5ML
30 SOLUTION ORAL ONCE
Status: COMPLETED | OUTPATIENT
Start: 2017-12-29 | End: 2017-12-29

## 2017-12-29 RX ORDER — SIMETHICONE 80 MG
80 TABLET,CHEWABLE ORAL 4 TIMES DAILY PRN
Status: DISCONTINUED | OUTPATIENT
Start: 2017-12-29 | End: 2017-12-31 | Stop reason: HOSPADM

## 2017-12-29 RX ORDER — METOCLOPRAMIDE HYDROCHLORIDE 5 MG/ML
10 INJECTION INTRAMUSCULAR; INTRAVENOUS EVERY 6 HOURS PRN
Status: DISCONTINUED | OUTPATIENT
Start: 2017-12-29 | End: 2017-12-31 | Stop reason: HOSPADM

## 2017-12-29 RX ORDER — DOCUSATE SODIUM 100 MG/1
100 CAPSULE, LIQUID FILLED ORAL 2 TIMES DAILY PRN
Status: DISCONTINUED | OUTPATIENT
Start: 2017-12-29 | End: 2017-12-31 | Stop reason: HOSPADM

## 2017-12-29 RX ORDER — FENTANYL CITRATE 50 UG/ML
INJECTION, SOLUTION INTRAMUSCULAR; INTRAVENOUS AS NEEDED
Status: DISCONTINUED | OUTPATIENT
Start: 2017-12-29 | End: 2017-12-29 | Stop reason: SURG

## 2017-12-29 RX ORDER — OXYTOCIN 10 [USP'U]/ML
INJECTION, SOLUTION INTRAMUSCULAR; INTRAVENOUS AS NEEDED
Status: DISCONTINUED | OUTPATIENT
Start: 2017-12-29 | End: 2017-12-29 | Stop reason: SURG

## 2017-12-29 RX ORDER — SODIUM CHLORIDE 0.9 % (FLUSH) 0.9 %
1-10 SYRINGE (ML) INJECTION AS NEEDED
Status: DISCONTINUED | OUTPATIENT
Start: 2017-12-29 | End: 2017-12-29 | Stop reason: HOSPADM

## 2017-12-29 RX ORDER — OXYTOCIN-SODIUM CHLORIDE 0.9% IV SOLN 30 UNIT/500ML 30-0.9/5 UT/ML-%
2-24 SOLUTION INTRAVENOUS
Status: DISCONTINUED | OUTPATIENT
Start: 2017-12-29 | End: 2017-12-29

## 2017-12-29 RX ORDER — ONDANSETRON 4 MG/1
4 TABLET, FILM COATED ORAL EVERY 6 HOURS PRN
Status: DISCONTINUED | OUTPATIENT
Start: 2017-12-29 | End: 2017-12-31 | Stop reason: HOSPADM

## 2017-12-29 RX ORDER — MORPHINE SULFATE 0.5 MG/ML
INJECTION, SOLUTION EPIDURAL; INTRATHECAL; INTRAVENOUS AS NEEDED
Status: DISCONTINUED | OUTPATIENT
Start: 2017-12-29 | End: 2017-12-29 | Stop reason: SURG

## 2017-12-29 RX ORDER — BUPIVACAINE HYDROCHLORIDE 7.5 MG/ML
INJECTION, SOLUTION EPIDURAL; RETROBULBAR AS NEEDED
Status: DISCONTINUED | OUTPATIENT
Start: 2017-12-29 | End: 2017-12-29 | Stop reason: SURG

## 2017-12-29 RX ORDER — BISACODYL 10 MG
10 SUPPOSITORY, RECTAL RECTAL DAILY PRN
Status: DISCONTINUED | OUTPATIENT
Start: 2017-12-30 | End: 2017-12-31 | Stop reason: HOSPADM

## 2017-12-29 RX ORDER — MAGNESIUM SULF/RINGERS LACTATE 40 G/500ML
3 PLASTIC BAG, INJECTION (ML) INTRAVENOUS CONTINUOUS
Status: DISCONTINUED | OUTPATIENT
Start: 2017-12-29 | End: 2017-12-29

## 2017-12-29 RX ORDER — IBUPROFEN 600 MG/1
600 TABLET ORAL EVERY 6 HOURS PRN
Status: COMPLETED | OUTPATIENT
Start: 2017-12-29 | End: 2017-12-30

## 2017-12-29 RX ADMIN — CEFAZOLIN SODIUM 2 G: 2 INJECTION, SOLUTION INTRAVENOUS at 08:50

## 2017-12-29 RX ADMIN — MIDAZOLAM HYDROCHLORIDE 1 MG: 1 INJECTION, SOLUTION INTRAMUSCULAR; INTRAVENOUS at 09:06

## 2017-12-29 RX ADMIN — SODIUM CHLORIDE, POTASSIUM CHLORIDE, SODIUM LACTATE AND CALCIUM CHLORIDE 1000 ML: 600; 310; 30; 20 INJECTION, SOLUTION INTRAVENOUS at 08:26

## 2017-12-29 RX ADMIN — SODIUM CHLORIDE, POTASSIUM CHLORIDE, SODIUM LACTATE AND CALCIUM CHLORIDE: 600; 310; 30; 20 INJECTION, SOLUTION INTRAVENOUS at 09:51

## 2017-12-29 RX ADMIN — ONDANSETRON 4 MG: 2 INJECTION INTRAMUSCULAR; INTRAVENOUS at 13:45

## 2017-12-29 RX ADMIN — SODIUM CHLORIDE, POTASSIUM CHLORIDE, SODIUM LACTATE AND CALCIUM CHLORIDE 1000 ML/HR: 600; 310; 30; 20 INJECTION, SOLUTION INTRAVENOUS at 08:50

## 2017-12-29 RX ADMIN — OSELTAMIVIR PHOSPHATE 75 MG: 75 CAPSULE ORAL at 15:12

## 2017-12-29 RX ADMIN — OXYTOCIN 2 MILLI-UNITS/MIN: 10 INJECTION INTRAVENOUS at 09:55

## 2017-12-29 RX ADMIN — Medication 3 G/HR: at 06:31

## 2017-12-29 RX ADMIN — METOCLOPRAMIDE 10 MG: 5 INJECTION, SOLUTION INTRAMUSCULAR; INTRAVENOUS at 16:25

## 2017-12-29 RX ADMIN — MORPHINE SULFATE 0.15 MG: 0.5 INJECTION, SOLUTION EPIDURAL; INTRATHECAL; INTRAVENOUS at 09:11

## 2017-12-29 RX ADMIN — BENZOCAINE AND MENTHOL: 20; .5 SPRAY TOPICAL at 13:45

## 2017-12-29 RX ADMIN — CEFAZOLIN SODIUM 2 G: 2 INJECTION, SOLUTION INTRAVENOUS at 06:24

## 2017-12-29 RX ADMIN — FAMOTIDINE 20 MG: 10 INJECTION, SOLUTION INTRAVENOUS at 09:23

## 2017-12-29 RX ADMIN — WITCH HAZEL 1 PAD: 500 SOLUTION RECTAL; TOPICAL at 13:45

## 2017-12-29 RX ADMIN — BETAMETHASONE SODIUM PHOSPHATE AND BETAMETHASONE ACETATE 12 MG: 3; 3 INJECTION, SUSPENSION INTRA-ARTICULAR; INTRALESIONAL; INTRAMUSCULAR at 06:23

## 2017-12-29 RX ADMIN — SODIUM CITRATE AND CITRIC ACID MONOHYDRATE 30 ML: 500; 334 SOLUTION ORAL at 09:00

## 2017-12-29 RX ADMIN — METOCLOPRAMIDE 10 MG: 5 INJECTION, SOLUTION INTRAMUSCULAR; INTRAVENOUS at 09:23

## 2017-12-29 RX ADMIN — IBUPROFEN 600 MG: 600 TABLET, FILM COATED ORAL at 21:42

## 2017-12-29 RX ADMIN — PHENYLEPHRINE HYDROCHLORIDE 100 MCG: 10 INJECTION INTRAVENOUS at 09:21

## 2017-12-29 RX ADMIN — SODIUM CHLORIDE, POTASSIUM CHLORIDE, SODIUM LACTATE AND CALCIUM CHLORIDE: 600; 310; 30; 20 INJECTION, SOLUTION INTRAVENOUS at 09:16

## 2017-12-29 RX ADMIN — BUPIVACAINE HYDROCHLORIDE 1.4 ML: 7.5 INJECTION, SOLUTION EPIDURAL; RETROBULBAR at 09:11

## 2017-12-29 RX ADMIN — FENTANYL CITRATE 15 MCG: 50 INJECTION, SOLUTION INTRAMUSCULAR; INTRAVENOUS at 09:11

## 2017-12-29 RX ADMIN — DEXTROSE AND SODIUM CHLORIDE 100 ML/HR: 5; 200 INJECTION, SOLUTION INTRAVENOUS at 06:06

## 2017-12-29 RX ADMIN — OXYTOCIN 30 UNITS: 10 INJECTION, SOLUTION INTRAMUSCULAR; INTRAVENOUS at 10:03

## 2017-12-29 RX ADMIN — PHENYLEPHRINE HYDROCHLORIDE 100 MCG: 10 INJECTION INTRAVENOUS at 09:30

## 2017-12-29 RX ADMIN — HYDROCORTISONE 2.5% 1 APPLICATION: 25 CREAM TOPICAL at 13:45

## 2017-12-29 RX ADMIN — PHENYLEPHRINE HYDROCHLORIDE 100 MCG: 10 INJECTION INTRAVENOUS at 09:23

## 2017-12-29 RX ADMIN — ONDANSETRON 4 MG: 2 INJECTION INTRAMUSCULAR; INTRAVENOUS at 06:07

## 2017-12-29 NOTE — ANESTHESIA PROCEDURE NOTES
Spinal Block    Indication:procedure for pain  Performed By  Anesthesiologist: J CARLOS LLOYD  CRNA: VIDAL REDMAN  Preanesthetic Checklist  Completed: patient identified, surgical consent, pre-op evaluation, timeout performed, IV checked, risks and benefits discussed and monitors and equipment checked  Spinal Block Prep:  Patient Position:sitting  Sterile Tech:cap, gloves, mask and sterile barriers  Prep:Betadine  Patient Monitoring:blood pressure monitoring, continuous pulse oximetry and EKG  Spinal Block Procedure  Approach:midline  Guidance:palpation technique  Location:L3-L4  Needle Type:Prerna  Needle Gauge:25 G  Placement of Spinal needle event:cerebrospinal fluid aspirated  Paresthesia: no  Fluid Appearance:clear  Post Assessment  Patient Tolerance:patient tolerated the procedure well with no apparent complications  Complications no

## 2017-12-29 NOTE — ANESTHESIA POSTPROCEDURE EVALUATION
Patient: Amie Sepulveda    Procedure Summary     Date Anesthesia Start Anesthesia Stop Room / Location    17 0905 1013 [facility] / [facility]       Procedure Diagnosis Surgeon Provider     SECTION PRIMARY (N/A Abdomen) No diagnosis on file. MD Agustin Henley MD          Anesthesia Type: spinal, ITN  Last vitals  BP   97/62   Temp 97.6   Pulse 98   Resp 16   SpO2 100%     Post Anesthesia Care and Evaluation    Patient location during evaluation: bedside  Patient participation: complete - patient participated  Level of consciousness: awake and alert  Pain score: 0  Pain management: adequate  Airway patency: patent  Anesthetic complications: No anesthetic complications    Cardiovascular status: acceptable  Respiratory status: acceptable  Hydration status: acceptable

## 2017-12-29 NOTE — ANESTHESIA PREPROCEDURE EVALUATION
Anesthesia Evaluation     Patient summary reviewed and Nursing notes reviewed   no history of anesthetic complications:  NPO Solid Status: > 8 hours  NPO Liquid Status: > 2 hours     Airway   Mallampati: II  TM distance: >3 FB  Neck ROM: full  no difficulty expected  Dental      Pulmonary - negative pulmonary ROS     ROS comment: Positive for Flu  Cardiovascular - negative cardio ROS        Neuro/Psych  (+) psychiatric history Anxiety and Depression,     GI/Hepatic/Renal/Endo      Musculoskeletal (-) negative ROS    Abdominal    Substance History - negative use     OB/GYN    (+) Pregnant,         Other - negative ROS                                             Anesthesia Plan    ASA 3     spinal and ITN     Anesthetic plan and risks discussed with patient.

## 2017-12-30 LAB
HCT VFR BLD AUTO: 32.2 % (ref 34.5–44)
HGB BLD-MCNC: 10.9 G/DL (ref 11.5–15.5)

## 2017-12-30 PROCEDURE — 85014 HEMATOCRIT: CPT | Performed by: OBSTETRICS & GYNECOLOGY

## 2017-12-30 PROCEDURE — 85018 HEMOGLOBIN: CPT | Performed by: OBSTETRICS & GYNECOLOGY

## 2017-12-30 RX ADMIN — IBUPROFEN 600 MG: 600 TABLET, FILM COATED ORAL at 03:41

## 2017-12-30 RX ADMIN — OSELTAMIVIR PHOSPHATE 75 MG: 75 CAPSULE ORAL at 18:34

## 2017-12-30 RX ADMIN — OSELTAMIVIR PHOSPHATE 75 MG: 75 CAPSULE ORAL at 03:41

## 2017-12-30 RX ADMIN — IBUPROFEN 600 MG: 600 TABLET, FILM COATED ORAL at 18:25

## 2017-12-30 RX ADMIN — GUAIFENESIN 200 MG: 200 SOLUTION ORAL at 23:12

## 2017-12-31 VITALS
HEART RATE: 82 BPM | DIASTOLIC BLOOD PRESSURE: 72 MMHG | TEMPERATURE: 97.8 F | HEIGHT: 63 IN | BODY MASS INDEX: 22.86 KG/M2 | WEIGHT: 129 LBS | SYSTOLIC BLOOD PRESSURE: 111 MMHG | OXYGEN SATURATION: 97 % | RESPIRATION RATE: 16 BRPM

## 2017-12-31 PROBLEM — O30.039 MONOCHORIONIC DIAMNIOTIC TWIN PREGNANCY, ANTEPARTUM: Status: RESOLVED | Noted: 2017-08-09 | Resolved: 2017-12-31

## 2017-12-31 PROBLEM — Q07.9: Status: RESOLVED | Noted: 2017-09-06 | Resolved: 2017-12-31

## 2017-12-31 PROBLEM — O28.3 INCREASED NUCHAL TRANSLUCENCY SPACE ON FETAL ULTRASOUND: Status: RESOLVED | Noted: 2017-08-09 | Resolved: 2017-12-31

## 2017-12-31 RX ORDER — LANOLIN 100 %
OINTMENT (GRAM) TOPICAL AS NEEDED
Status: DISCONTINUED | OUTPATIENT
Start: 2017-12-31 | End: 2017-12-31 | Stop reason: HOSPADM

## 2017-12-31 RX ORDER — HYDROCODONE BITARTRATE AND ACETAMINOPHEN 5; 325 MG/1; MG/1
1 TABLET ORAL EVERY 6 HOURS PRN
Qty: 10 TABLET | Refills: 0 | Status: SHIPPED | OUTPATIENT
Start: 2017-12-31 | End: 2018-01-08

## 2017-12-31 RX ORDER — IBUPROFEN 600 MG/1
600 TABLET ORAL EVERY 6 HOURS PRN
Qty: 60 TABLET | Refills: 1 | Status: SHIPPED | OUTPATIENT
Start: 2017-12-31 | End: 2020-10-23 | Stop reason: HOSPADM

## 2017-12-31 RX ADMIN — IBUPROFEN 600 MG: 600 TABLET, FILM COATED ORAL at 06:48

## 2017-12-31 RX ADMIN — GUAIFENESIN 200 MG: 200 SOLUTION ORAL at 06:44

## 2017-12-31 RX ADMIN — OSELTAMIVIR PHOSPHATE 75 MG: 75 CAPSULE ORAL at 06:44

## 2017-12-31 RX ADMIN — DOCUSATE SODIUM 100 MG: 100 CAPSULE, LIQUID FILLED ORAL at 07:49

## 2017-12-31 RX ADMIN — PRENATAL VIT W/ FE FUMARATE-FA TAB 27-0.8 MG 1 TABLET: 27-0.8 TAB at 07:48

## 2018-01-01 LAB — BACTERIA SPEC AEROBE CULT: NORMAL

## 2018-01-03 LAB
CYTO UR: NORMAL
LAB AP CASE REPORT: NORMAL
LAB AP CLINICAL INFORMATION: NORMAL
Lab: NORMAL
PATH REPORT.FINAL DX SPEC: NORMAL
PATH REPORT.GROSS SPEC: NORMAL

## 2018-01-25 ENCOUNTER — APPOINTMENT (OUTPATIENT)
Dept: WOMENS IMAGING | Facility: HOSPITAL | Age: 28
End: 2018-01-25
Attending: OBSTETRICS & GYNECOLOGY

## 2019-03-12 ENCOUNTER — LAB (OUTPATIENT)
Dept: LAB | Facility: HOSPITAL | Age: 29
End: 2019-03-12

## 2019-03-12 ENCOUNTER — TRANSCRIBE ORDERS (OUTPATIENT)
Dept: LAB | Facility: HOSPITAL | Age: 29
End: 2019-03-12

## 2019-03-12 DIAGNOSIS — F41.1 GENERALIZED ANXIETY DISORDER: ICD-10-CM

## 2019-03-12 DIAGNOSIS — F41.1 GENERALIZED ANXIETY DISORDER: Primary | ICD-10-CM

## 2019-03-12 DIAGNOSIS — F01.53 VASCULAR DEMENTIA WITH DEPRESSED MOOD (HCC): ICD-10-CM

## 2019-03-12 LAB
ALBUMIN SERPL-MCNC: 4.96 G/DL (ref 3.2–4.8)
ALBUMIN/GLOB SERPL: 2 G/DL (ref 1.5–2.5)
ALP SERPL-CCNC: 93 U/L (ref 25–100)
ALT SERPL W P-5'-P-CCNC: 23 U/L (ref 7–40)
ANION GAP SERPL CALCULATED.3IONS-SCNC: 14 MMOL/L (ref 3–11)
AST SERPL-CCNC: 21 U/L (ref 0–33)
BILIRUB SERPL-MCNC: 3.6 MG/DL (ref 0.3–1.2)
BUN BLD-MCNC: 9 MG/DL (ref 9–23)
BUN/CREAT SERPL: 13.4 (ref 7–25)
CALCIUM SPEC-SCNC: 9.8 MG/DL (ref 8.7–10.4)
CHLORIDE SERPL-SCNC: 101 MMOL/L (ref 99–109)
CO2 SERPL-SCNC: 22 MMOL/L (ref 20–31)
CREAT BLD-MCNC: 0.67 MG/DL (ref 0.6–1.3)
DEPRECATED RDW RBC AUTO: 40 FL (ref 37–54)
ERYTHROCYTE [DISTWIDTH] IN BLOOD BY AUTOMATED COUNT: 12.4 % (ref 11.3–14.5)
GFR SERPL CREATININE-BSD FRML MDRD: 103 ML/MIN/1.73
GLOBULIN UR ELPH-MCNC: 2.5 GM/DL
GLUCOSE BLD-MCNC: 118 MG/DL (ref 70–100)
HCT VFR BLD AUTO: 42 % (ref 34.5–44)
HGB BLD-MCNC: 13.9 G/DL (ref 11.5–15.5)
MCH RBC QN AUTO: 29.7 PG (ref 27–31)
MCHC RBC AUTO-ENTMCNC: 33.1 G/DL (ref 32–36)
MCV RBC AUTO: 89.7 FL (ref 80–99)
PLATELET # BLD AUTO: 349 10*3/MM3 (ref 150–450)
PMV BLD AUTO: 11.7 FL (ref 6–12)
POTASSIUM BLD-SCNC: 4.1 MMOL/L (ref 3.5–5.5)
PROT SERPL-MCNC: 7.5 G/DL (ref 5.7–8.2)
RBC # BLD AUTO: 4.68 10*6/MM3 (ref 3.89–5.14)
SODIUM BLD-SCNC: 137 MMOL/L (ref 132–146)
T4 SERPL-MCNC: 6.7 MCG/DL (ref 4.7–11.4)
TSH SERPL DL<=0.05 MIU/L-ACNC: 2.13 MIU/ML (ref 0.35–5.35)
WBC NRBC COR # BLD: 12.01 10*3/MM3 (ref 3.5–10.8)

## 2019-03-12 PROCEDURE — 84443 ASSAY THYROID STIM HORMONE: CPT

## 2019-03-12 PROCEDURE — 84436 ASSAY OF TOTAL THYROXINE: CPT

## 2019-03-12 PROCEDURE — 80053 COMPREHEN METABOLIC PANEL: CPT

## 2019-03-12 PROCEDURE — 84480 ASSAY TRIIODOTHYRONINE (T3): CPT

## 2019-03-12 PROCEDURE — 36415 COLL VENOUS BLD VENIPUNCTURE: CPT

## 2019-03-12 PROCEDURE — 85027 COMPLETE CBC AUTOMATED: CPT

## 2019-03-13 LAB — T3 SERPL-MCNC: 98 NG/DL (ref 71–180)

## 2020-02-10 ENCOUNTER — TRANSCRIBE ORDERS (OUTPATIENT)
Dept: ADMINISTRATIVE | Facility: HOSPITAL | Age: 30
End: 2020-02-10

## 2020-02-10 DIAGNOSIS — E80.6 HYPERBILIRUBINEMIA: Primary | ICD-10-CM

## 2020-02-14 ENCOUNTER — TRANSCRIBE ORDERS (OUTPATIENT)
Dept: ADMINISTRATIVE | Facility: HOSPITAL | Age: 30
End: 2020-02-14

## 2020-02-14 DIAGNOSIS — E80.6 HYPERBILIRUBINEMIA: Primary | ICD-10-CM

## 2020-03-02 ENCOUNTER — LAB (OUTPATIENT)
Dept: LAB | Facility: HOSPITAL | Age: 30
End: 2020-03-02

## 2020-03-02 ENCOUNTER — TRANSCRIBE ORDERS (OUTPATIENT)
Dept: LAB | Facility: HOSPITAL | Age: 30
End: 2020-03-02

## 2020-03-02 DIAGNOSIS — Z32.01 PREGNANCY EXAMINATION OR TEST, POSITIVE RESULT: ICD-10-CM

## 2020-03-02 DIAGNOSIS — Z32.01 PREGNANCY EXAMINATION OR TEST, POSITIVE RESULT: Primary | ICD-10-CM

## 2020-03-02 LAB
HCG INTACT+B SERPL-ACNC: 6379 MIU/ML
PROGEST SERPL-MCNC: 16.5 NG/ML

## 2020-03-02 PROCEDURE — 84144 ASSAY OF PROGESTERONE: CPT

## 2020-03-02 PROCEDURE — 36415 COLL VENOUS BLD VENIPUNCTURE: CPT

## 2020-03-02 PROCEDURE — 84702 CHORIONIC GONADOTROPIN TEST: CPT

## 2020-03-05 ENCOUNTER — LAB (OUTPATIENT)
Dept: LAB | Facility: HOSPITAL | Age: 30
End: 2020-03-05

## 2020-03-05 ENCOUNTER — APPOINTMENT (OUTPATIENT)
Dept: ULTRASOUND IMAGING | Facility: HOSPITAL | Age: 30
End: 2020-03-05

## 2020-03-05 DIAGNOSIS — Z34.81 PRENATAL CARE, SUBSEQUENT PREGNANCY, FIRST TRIMESTER: ICD-10-CM

## 2020-03-05 DIAGNOSIS — Z3A.01 LESS THAN 8 WEEKS GESTATION OF PREGNANCY: Primary | ICD-10-CM

## 2020-03-05 LAB
ABO GROUP BLD: NORMAL
AMPHET+METHAMPHET UR QL: NEGATIVE
AMPHETAMINES UR QL: NEGATIVE
BARBITURATES UR QL SCN: NEGATIVE
BASOPHILS # BLD AUTO: 0.04 10*3/MM3 (ref 0–0.2)
BASOPHILS NFR BLD AUTO: 0.6 % (ref 0–1.5)
BENZODIAZ UR QL SCN: NEGATIVE
BILIRUB UR QL STRIP: NEGATIVE
BLD GP AB SCN SERPL QL: NEGATIVE
BUPRENORPHINE SERPL-MCNC: NEGATIVE NG/ML
CANNABINOIDS SERPL QL: NEGATIVE
CLARITY UR: ABNORMAL
COCAINE UR QL: NEGATIVE
COLOR UR: YELLOW
DEPRECATED RDW RBC AUTO: 38.8 FL (ref 37–54)
EOSINOPHIL # BLD AUTO: 0.06 10*3/MM3 (ref 0–0.4)
EOSINOPHIL NFR BLD AUTO: 0.8 % (ref 0.3–6.2)
ERYTHROCYTE [DISTWIDTH] IN BLOOD BY AUTOMATED COUNT: 12.1 % (ref 12.3–15.4)
GLUCOSE BLD-MCNC: 75 MG/DL (ref 65–99)
GLUCOSE UR STRIP-MCNC: NEGATIVE MG/DL
HBV SURFACE AG SERPL QL IA: NORMAL
HCT VFR BLD AUTO: 38.5 % (ref 34–46.6)
HCV AB SER DONR QL: NORMAL
HGB BLD-MCNC: 13.1 G/DL (ref 12–15.9)
HGB UR QL STRIP.AUTO: NEGATIVE
HIV1+2 AB SER QL: NORMAL
IMM GRANULOCYTES # BLD AUTO: 0.02 10*3/MM3 (ref 0–0.05)
IMM GRANULOCYTES NFR BLD AUTO: 0.3 % (ref 0–0.5)
KETONES UR QL STRIP: NEGATIVE
LEUKOCYTE ESTERASE UR QL STRIP.AUTO: NEGATIVE
LYMPHOCYTES # BLD AUTO: 1.91 10*3/MM3 (ref 0.7–3.1)
LYMPHOCYTES NFR BLD AUTO: 26.4 % (ref 19.6–45.3)
MCH RBC QN AUTO: 30 PG (ref 26.6–33)
MCHC RBC AUTO-ENTMCNC: 34 G/DL (ref 31.5–35.7)
MCV RBC AUTO: 88.1 FL (ref 79–97)
METHADONE UR QL SCN: NEGATIVE
MONOCYTES # BLD AUTO: 0.67 10*3/MM3 (ref 0.1–0.9)
MONOCYTES NFR BLD AUTO: 9.3 % (ref 5–12)
NEUTROPHILS # BLD AUTO: 4.54 10*3/MM3 (ref 1.7–7)
NEUTROPHILS NFR BLD AUTO: 62.6 % (ref 42.7–76)
NITRITE UR QL STRIP: NEGATIVE
NRBC BLD AUTO-RTO: 0 /100 WBC (ref 0–0.2)
OPIATES UR QL: NEGATIVE
OXYCODONE UR QL SCN: NEGATIVE
PCP UR QL SCN: NEGATIVE
PH UR STRIP.AUTO: 7.5 [PH] (ref 5–8)
PLATELET # BLD AUTO: 280 10*3/MM3 (ref 140–450)
PMV BLD AUTO: 11.6 FL (ref 6–12)
PROPOXYPH UR QL: NEGATIVE
PROT UR QL STRIP: NEGATIVE
RBC # BLD AUTO: 4.37 10*6/MM3 (ref 3.77–5.28)
RH BLD: POSITIVE
SP GR UR STRIP: 1.02 (ref 1–1.03)
TRICYCLICS UR QL SCN: NEGATIVE
UROBILINOGEN UR QL STRIP: ABNORMAL
WBC NRBC COR # BLD: 7.24 10*3/MM3 (ref 3.4–10.8)

## 2020-03-05 PROCEDURE — 36415 COLL VENOUS BLD VENIPUNCTURE: CPT

## 2020-03-05 PROCEDURE — 84443 ASSAY THYROID STIM HORMONE: CPT

## 2020-03-05 PROCEDURE — 80306 DRUG TEST PRSMV INSTRMNT: CPT

## 2020-03-05 PROCEDURE — 80081 OBSTETRIC PANEL INC HIV TSTG: CPT

## 2020-03-05 PROCEDURE — 82947 ASSAY GLUCOSE BLOOD QUANT: CPT

## 2020-03-05 PROCEDURE — 81003 URINALYSIS AUTO W/O SCOPE: CPT

## 2020-03-05 PROCEDURE — 86803 HEPATITIS C AB TEST: CPT

## 2020-03-06 LAB
HOLD SPECIMEN: NORMAL
RPR SER QL: NORMAL
RUBV IGG SERPL IA-ACNC: POSITIVE
TSH SERPL DL<=0.05 MIU/L-ACNC: 1.17 UIU/ML (ref 0.27–4.2)

## 2020-03-17 ENCOUNTER — HOSPITAL ENCOUNTER (OUTPATIENT)
Dept: GENERAL RADIOLOGY | Facility: HOSPITAL | Age: 30
Discharge: HOME | End: 2020-03-17
Admitting: FAMILY MEDICINE

## 2020-03-17 ENCOUNTER — TRANSCRIBE ORDERS (OUTPATIENT)
Dept: GENERAL RADIOLOGY | Facility: HOSPITAL | Age: 30
End: 2020-03-17

## 2020-03-17 DIAGNOSIS — M79.645 FINGER PAIN, LEFT: ICD-10-CM

## 2020-03-17 DIAGNOSIS — M79.645 FINGER PAIN, LEFT: Primary | ICD-10-CM

## 2020-03-17 PROCEDURE — 73140 X-RAY EXAM OF FINGER(S): CPT

## 2020-05-08 ENCOUNTER — HOSPITAL ENCOUNTER (EMERGENCY)
Facility: HOSPITAL | Age: 30
Discharge: HOME | End: 2020-05-08
Attending: EMERGENCY MEDICINE | Admitting: EMERGENCY MEDICINE

## 2020-05-08 ENCOUNTER — APPOINTMENT (OUTPATIENT)
Dept: MRI IMAGING | Facility: HOSPITAL | Age: 30
End: 2020-05-08

## 2020-05-08 VITALS
WEIGHT: 110 LBS | OXYGEN SATURATION: 79 % | SYSTOLIC BLOOD PRESSURE: 105 MMHG | HEART RATE: 90 BPM | DIASTOLIC BLOOD PRESSURE: 61 MMHG | TEMPERATURE: 98.5 F | RESPIRATION RATE: 16 BRPM | BODY MASS INDEX: 20.24 KG/M2 | HEIGHT: 62 IN

## 2020-05-08 DIAGNOSIS — R82.81 PYURIA: ICD-10-CM

## 2020-05-08 DIAGNOSIS — Z3A.15 15 WEEKS GESTATION OF PREGNANCY: ICD-10-CM

## 2020-05-08 DIAGNOSIS — R20.2 PARESTHESIA: Primary | ICD-10-CM

## 2020-05-08 DIAGNOSIS — R51.9 ACUTE NONINTRACTABLE HEADACHE, UNSPECIFIED HEADACHE TYPE: ICD-10-CM

## 2020-05-08 LAB
ALBUMIN SERPL-MCNC: 3.6 G/DL (ref 3.5–5.2)
ALBUMIN/GLOB SERPL: 1.3 G/DL
ALP SERPL-CCNC: 50 U/L (ref 39–117)
ALT SERPL W P-5'-P-CCNC: 13 U/L (ref 1–33)
ANION GAP SERPL CALCULATED.3IONS-SCNC: 12 MMOL/L (ref 5–15)
AST SERPL-CCNC: 16 U/L (ref 1–32)
BACTERIA UR QL AUTO: ABNORMAL /HPF
BASOPHILS # BLD AUTO: 0.03 10*3/MM3 (ref 0–0.2)
BASOPHILS NFR BLD AUTO: 0.3 % (ref 0–1.5)
BILIRUB SERPL-MCNC: 0.9 MG/DL (ref 0.2–1.2)
BILIRUB UR QL STRIP: NEGATIVE
BUN BLD-MCNC: 8 MG/DL (ref 6–20)
BUN/CREAT SERPL: 16.3 (ref 7–25)
CALCIUM SPEC-SCNC: 8.5 MG/DL (ref 8.6–10.5)
CHLORIDE SERPL-SCNC: 102 MMOL/L (ref 98–107)
CLARITY UR: CLEAR
CO2 SERPL-SCNC: 21 MMOL/L (ref 22–29)
COLOR UR: YELLOW
CREAT BLD-MCNC: 0.49 MG/DL (ref 0.57–1)
DEPRECATED RDW RBC AUTO: 40.7 FL (ref 37–54)
EOSINOPHIL # BLD AUTO: 0.06 10*3/MM3 (ref 0–0.4)
EOSINOPHIL NFR BLD AUTO: 0.7 % (ref 0.3–6.2)
ERYTHROCYTE [DISTWIDTH] IN BLOOD BY AUTOMATED COUNT: 12.5 % (ref 12.3–15.4)
GFR SERPL CREATININE-BSD FRML MDRD: 147 ML/MIN/1.73
GLOBULIN UR ELPH-MCNC: 2.8 GM/DL
GLUCOSE BLD-MCNC: 90 MG/DL (ref 65–99)
GLUCOSE UR STRIP-MCNC: NEGATIVE MG/DL
HCT VFR BLD AUTO: 34.1 % (ref 34–46.6)
HGB BLD-MCNC: 11.5 G/DL (ref 12–15.9)
HGB UR QL STRIP.AUTO: NEGATIVE
HYALINE CASTS UR QL AUTO: ABNORMAL /LPF
IMM GRANULOCYTES # BLD AUTO: 0.04 10*3/MM3 (ref 0–0.05)
IMM GRANULOCYTES NFR BLD AUTO: 0.4 % (ref 0–0.5)
KETONES UR QL STRIP: NEGATIVE
LEUKOCYTE ESTERASE UR QL STRIP.AUTO: ABNORMAL
LYMPHOCYTES # BLD AUTO: 1.62 10*3/MM3 (ref 0.7–3.1)
LYMPHOCYTES NFR BLD AUTO: 18 % (ref 19.6–45.3)
MCH RBC QN AUTO: 30.2 PG (ref 26.6–33)
MCHC RBC AUTO-ENTMCNC: 33.7 G/DL (ref 31.5–35.7)
MCV RBC AUTO: 89.5 FL (ref 79–97)
MONOCYTES # BLD AUTO: 0.67 10*3/MM3 (ref 0.1–0.9)
MONOCYTES NFR BLD AUTO: 7.4 % (ref 5–12)
NEUTROPHILS # BLD AUTO: 6.6 10*3/MM3 (ref 1.7–7)
NEUTROPHILS NFR BLD AUTO: 73.2 % (ref 42.7–76)
NITRITE UR QL STRIP: NEGATIVE
NRBC BLD AUTO-RTO: 0 /100 WBC (ref 0–0.2)
PH UR STRIP.AUTO: 6.5 [PH] (ref 5–8)
PLATELET # BLD AUTO: 245 10*3/MM3 (ref 140–450)
PMV BLD AUTO: 10.7 FL (ref 6–12)
POTASSIUM BLD-SCNC: 3.7 MMOL/L (ref 3.5–5.2)
PROT SERPL-MCNC: 6.4 G/DL (ref 6–8.5)
PROT UR QL STRIP: NEGATIVE
RBC # BLD AUTO: 3.81 10*6/MM3 (ref 3.77–5.28)
RBC # UR: ABNORMAL /HPF
REF LAB TEST METHOD: ABNORMAL
SODIUM BLD-SCNC: 135 MMOL/L (ref 136–145)
SP GR UR STRIP: 1.01 (ref 1–1.03)
SQUAMOUS #/AREA URNS HPF: ABNORMAL /HPF
TSH SERPL DL<=0.05 MIU/L-ACNC: 1.04 UIU/ML (ref 0.27–4.2)
UROBILINOGEN UR QL STRIP: ABNORMAL
WBC NRBC COR # BLD: 9.02 10*3/MM3 (ref 3.4–10.8)
WBC UR QL AUTO: ABNORMAL /HPF

## 2020-05-08 PROCEDURE — 99285 EMERGENCY DEPT VISIT HI MDM: CPT

## 2020-05-08 PROCEDURE — 80053 COMPREHEN METABOLIC PANEL: CPT | Performed by: EMERGENCY MEDICINE

## 2020-05-08 PROCEDURE — 85025 COMPLETE CBC W/AUTO DIFF WBC: CPT | Performed by: EMERGENCY MEDICINE

## 2020-05-08 PROCEDURE — 84443 ASSAY THYROID STIM HORMONE: CPT | Performed by: EMERGENCY MEDICINE

## 2020-05-08 PROCEDURE — 81001 URINALYSIS AUTO W/SCOPE: CPT | Performed by: EMERGENCY MEDICINE

## 2020-05-08 PROCEDURE — 93005 ELECTROCARDIOGRAM TRACING: CPT | Performed by: EMERGENCY MEDICINE

## 2020-05-08 PROCEDURE — 70551 MRI BRAIN STEM W/O DYE: CPT

## 2020-05-08 RX ORDER — CEFDINIR 300 MG/1
300 CAPSULE ORAL 2 TIMES DAILY
Qty: 10 CAPSULE | Refills: 0 | Status: SHIPPED | OUTPATIENT
Start: 2020-05-08 | End: 2020-05-13

## 2020-05-08 RX ORDER — NITROFURANTOIN 25; 75 MG/1; MG/1
100 CAPSULE ORAL 2 TIMES DAILY
Qty: 10 CAPSULE | Refills: 0 | Status: SHIPPED | OUTPATIENT
Start: 2020-05-08 | End: 2020-05-08 | Stop reason: ALTCHOICE

## 2020-05-08 RX ORDER — ACETAMINOPHEN 325 MG/1
650 TABLET ORAL ONCE
Status: COMPLETED | OUTPATIENT
Start: 2020-05-08 | End: 2020-05-08

## 2020-05-08 RX ADMIN — SODIUM CHLORIDE 1000 ML: 9 INJECTION, SOLUTION INTRAVENOUS at 17:16

## 2020-05-08 RX ADMIN — ACETAMINOPHEN 650 MG: 325 TABLET, FILM COATED ORAL at 14:47

## 2020-05-08 NOTE — ED PROVIDER NOTES
Subjective   Amie Sepulveda is a 29 y.o. female who presents to the ED with c/o headache and neurologic problem. The patient is currently 15 weeks pregnant and she is A1 with giving birth to twins. 2 days ago she had a 25 minute episode where she felt disoriented and experienced some temporary memory loss. Since then she has had a migraine, which she has a history of, as well as numbness and tingling in her bilateral upper extremities. The patient complains of mild vision loss but denies fever, weakness, chest pain, shortness of breath, nausea, vomiting, diarrhea, and abnormal urinary symptoms. Her OB-GYN is Dr. Ramírez. She has a past medical history of anxiety and depression. Her surgical history includes appendectomy, dilation and curettage of the uterus, and ulnar nerve transposition. There are no other acute complaints at this time.      History provided by:  Patient  Headache   Pain location:  Generalized  Quality:  Dull  Radiates to:  Does not radiate  Severity currently:  Unable to specify  Severity at highest:  Unable to specify  Onset quality:  Sudden  Duration:  2 days  Timing:  Constant  Progression:  Worsening  Chronicity:  New  Similar to prior headaches: yes    Context comment:  She is currently 15 weeks pregnant  Relieved by:  None tried  Worsened by:  Nothing  Ineffective treatments:  None tried  Associated symptoms: visual change    Associated symptoms: no abdominal pain, no back pain, no diarrhea, no fever, no nausea, no vomiting and no weakness    Risk factors: no anger, no family hx of SAH and does not have insomnia        Review of Systems   Constitutional: Negative for fever.   Eyes: Positive for visual disturbance.        The patient complains of mild vision loss.   Respiratory: Negative for shortness of breath.    Cardiovascular: Negative for chest pain.   Gastrointestinal: Negative for abdominal pain, diarrhea, nausea and vomiting.   Genitourinary: Negative for decreased urine  volume, difficulty urinating, dysuria, frequency, hematuria and urgency.        She is currently 15 weeks pregnant.   Musculoskeletal: Negative for back pain.   Neurological: Positive for headaches. Negative for weakness.        The patient complains of bilateral upper extremity numbness.  She complains of temporary memory loss and disorientation 2 days ago.   All other systems reviewed and are negative.      Past Medical History:   Diagnosis Date   • Anxiety    • Depression        Allergies   Allergen Reactions   • Penicillins Rash       Past Surgical History:   Procedure Laterality Date   • APPENDECTOMY     • DILATATION AND CURETTAGE     • ULNAR NERVE TRANSPOSITION     • WISDOM TOOTH EXTRACTION         History reviewed. No pertinent family history.    Social History     Socioeconomic History   • Marital status:      Spouse name: Not on file   • Number of children: Not on file   • Years of education: Not on file   • Highest education level: Not on file   Tobacco Use   • Smoking status: Never Smoker   • Smokeless tobacco: Never Used   Substance and Sexual Activity   • Alcohol use: No   • Drug use: No   • Sexual activity: Yes     Partners: Male         Objective   Physical Exam   Constitutional: She is oriented to person, place, and time. She appears well-developed and well-nourished. No distress.   HENT:   Head: Normocephalic and atraumatic.   Eyes: Conjunctivae are normal. No scleral icterus.   Neck: Normal range of motion. Neck supple.   Cardiovascular: Normal rate, regular rhythm and normal heart sounds.   No murmur heard.  Pulmonary/Chest: Effort normal and breath sounds normal. No respiratory distress.   Abdominal: Soft. There is no tenderness.   Musculoskeletal: Normal range of motion. She exhibits no edema.   Neurological: She is alert and oriented to person, place, and time.   Skin: Skin is warm and dry.   Psychiatric: She has a normal mood and affect. Her behavior is normal.   Nursing note and vitals  reviewed.      Procedures         ED Course     Recent Results (from the past 24 hour(s))   Comprehensive Metabolic Panel    Collection Time: 05/08/20  2:18 PM   Result Value Ref Range    Glucose 90 65 - 99 mg/dL    BUN 8 6 - 20 mg/dL    Creatinine 0.49 (L) 0.57 - 1.00 mg/dL    Sodium 135 (L) 136 - 145 mmol/L    Potassium 3.7 3.5 - 5.2 mmol/L    Chloride 102 98 - 107 mmol/L    CO2 21.0 (L) 22.0 - 29.0 mmol/L    Calcium 8.5 (L) 8.6 - 10.5 mg/dL    Total Protein 6.4 6.0 - 8.5 g/dL    Albumin 3.60 3.50 - 5.20 g/dL    ALT (SGPT) 13 1 - 33 U/L    AST (SGOT) 16 1 - 32 U/L    Alkaline Phosphatase 50 39 - 117 U/L    Total Bilirubin 0.9 0.2 - 1.2 mg/dL    eGFR Non African Amer 147 >60 mL/min/1.73    Globulin 2.8 gm/dL    A/G Ratio 1.3 g/dL    BUN/Creatinine Ratio 16.3 7.0 - 25.0    Anion Gap 12.0 5.0 - 15.0 mmol/L   TSH    Collection Time: 05/08/20  2:18 PM   Result Value Ref Range    TSH 1.040 0.270 - 4.200 uIU/mL   CBC Auto Differential    Collection Time: 05/08/20  2:18 PM   Result Value Ref Range    WBC 9.02 3.40 - 10.80 10*3/mm3    RBC 3.81 3.77 - 5.28 10*6/mm3    Hemoglobin 11.5 (L) 12.0 - 15.9 g/dL    Hematocrit 34.1 34.0 - 46.6 %    MCV 89.5 79.0 - 97.0 fL    MCH 30.2 26.6 - 33.0 pg    MCHC 33.7 31.5 - 35.7 g/dL    RDW 12.5 12.3 - 15.4 %    RDW-SD 40.7 37.0 - 54.0 fl    MPV 10.7 6.0 - 12.0 fL    Platelets 245 140 - 450 10*3/mm3    Neutrophil % 73.2 42.7 - 76.0 %    Lymphocyte % 18.0 (L) 19.6 - 45.3 %    Monocyte % 7.4 5.0 - 12.0 %    Eosinophil % 0.7 0.3 - 6.2 %    Basophil % 0.3 0.0 - 1.5 %    Immature Grans % 0.4 0.0 - 0.5 %    Neutrophils, Absolute 6.60 1.70 - 7.00 10*3/mm3    Lymphocytes, Absolute 1.62 0.70 - 3.10 10*3/mm3    Monocytes, Absolute 0.67 0.10 - 0.90 10*3/mm3    Eosinophils, Absolute 0.06 0.00 - 0.40 10*3/mm3    Basophils, Absolute 0.03 0.00 - 0.20 10*3/mm3    Immature Grans, Absolute 0.04 0.00 - 0.05 10*3/mm3    nRBC 0.0 0.0 - 0.2 /100 WBC   Urinalysis With Microscopic If Indicated (No Culture) -  Urine, Clean Catch    Collection Time: 05/08/20  2:19 PM   Result Value Ref Range    Color, UA Yellow Yellow, Straw    Appearance, UA Clear Clear    pH, UA 6.5 5.0 - 8.0    Specific Gravity, UA 1.006 1.001 - 1.030    Glucose, UA Negative Negative    Ketones, UA Negative Negative    Bilirubin, UA Negative Negative    Blood, UA Negative Negative    Protein, UA Negative Negative    Leuk Esterase, UA Moderate (2+) (A) Negative    Nitrite, UA Negative Negative    Urobilinogen, UA 0.2 E.U./dL 0.2 - 1.0 E.U./dL   Urinalysis, Microscopic Only - Urine, Clean Catch    Collection Time: 05/08/20  2:19 PM   Result Value Ref Range    RBC, UA 0-2 None Seen, 0-2 /HPF    WBC, UA 3-5 (A) None Seen, 0-2 /HPF    Bacteria, UA None Seen None Seen, Trace /HPF    Squamous Epithelial Cells, UA 7-12 (A) None Seen, 0-2 /HPF    Hyaline Casts, UA None Seen 0 - 6 /LPF    Methodology Automated Microscopy      Note: In addition to lab results from this visit, the labs listed above may include labs taken at another facility or during a different encounter within the last 24 hours. Please correlate lab times with ED admission and discharge times for further clarification of the services performed during this visit.    MRI Brain Without Contrast   Preliminary Result   No acute Intracranial abnormality is identified.       DICTATED:   05/08/2020   EDITED/ls :   05/08/2020                  Vitals:    05/08/20 1517 05/08/20 1609 05/08/20 1611 05/08/20 1700   BP:  98/70     BP Location:       Patient Position:       Pulse:  76     Resp:  16     Temp:       TempSrc:       SpO2: 98% 99% 97% 95%   Weight:       Height:         Medications   sodium chloride 0.9 % bolus 1,000 mL (1,000 mL Intravenous New Bag 5/8/20 1716)   acetaminophen (TYLENOL) tablet 650 mg (650 mg Oral Given 5/8/20 1447)     ECG/EMG Results (last 24 hours)     ** No results found for the last 24 hours. **        ECG 12 Lead               Pt's symptoms were vague and MRI is reassuring. Pt  will follow up with neuro, [facility], and PCP as outpatient.  Pt felt much better following treatment and is comfortable with DC at this time.              MDM    Final diagnoses:   Paresthesia   Acute nonintractable headache, unspecified headache type   15 weeks gestation of pregnancy   Pyuria       Documentation assistance provided by zoraida Griffin.  Information recorded by the zoraida was done at my direction and has been verified and validated by me.     Filipe Griffin  05/08/20 1430       Filipe Griffin  05/08/20 1730       Sunday Lindsay DO  05/09/20 [id number]

## 2020-07-28 ENCOUNTER — OFFICE VISIT (OUTPATIENT)
Dept: NEUROLOGY | Facility: CLINIC | Age: 30
End: 2020-07-28

## 2020-07-28 VITALS — BODY MASS INDEX: 20.24 KG/M2 | WEIGHT: 110 LBS | TEMPERATURE: 98.2 F | HEIGHT: 62 IN

## 2020-07-28 DIAGNOSIS — G43.109 MIGRAINE WITH AURA AND WITHOUT STATUS MIGRAINOSUS, NOT INTRACTABLE: Primary | ICD-10-CM

## 2020-07-28 PROCEDURE — 99203 OFFICE O/P NEW LOW 30 MIN: CPT | Performed by: PHYSICIAN ASSISTANT

## 2020-07-28 RX ORDER — FLUOXETINE HYDROCHLORIDE 40 MG/1
40 CAPSULE ORAL DAILY
COMMUNITY
End: 2021-09-20

## 2020-07-28 NOTE — PROGRESS NOTES
"Subjective     Chief Complaint: headaches      History of Present Illness   Amie Sepulveda is a 29 y.o. female who comes to clinic today for evaluation of migraines. She initially noted symptoms on approximately 2016 marked by throbbing left occipital headaches, which radiate to the retro-orbital region bilaterally. This has worsened over time. She now typically notes 1-2 headaches a week, which can last up to several hours. There is associated nausea as well as light and sound sensitivity. She also notes associated loss of vision which typically precede her headaches. Her symptoms are typically worse with increased stress.    She was seen at [facility] ED in 5/20 for migraine. At this time, in addition to her headache, she noted associated confusion and memory loss as well as right sided weakness and numbness and tingling in her hands bilaterally. The confusion lasted for approximately 30 minutes before resolving. Her numbness and paresthesias lasting for several hours. Workup included an MRI of the brain, which was unremarkable.     She has previously tried Imitrex PRN, which was not significantly beneficial.     It is noted that she is currently 27 weeks pregnant.       I have reviewed and confirmed the past family, social and medical history as accurate on 7/28/2020.     Review of Systems   Constitutional: Negative.    Eyes: Negative.    Respiratory: Negative.    Cardiovascular: Negative.    Gastrointestinal: Negative.    Endocrine: Negative.    Genitourinary: Negative.    Musculoskeletal: Negative.    Skin: Negative.    Allergic/Immunologic: Negative.    Neurological: Positive for headaches.   Hematological: Negative.    Psychiatric/Behavioral: Negative.        Objective     Temp 98.2 °F (36.8 °C)   Ht 157.5 cm (62\")   Wt 49.9 kg (110 lb)   LMP 01/18/2020 (Approximate)   BMI 20.12 kg/m²     General appearance today is normal.   Peripheral pulses were present and symmetric.  The ophthalmoscopic exam today is " unremarkable. The discs and posterior elements are unremarkable.      Physical Exam   Neurological: She has normal strength. She has a normal Finger-Nose-Finger Test and a normal Heel to Shin Test. Gait normal.   Reflex Scores:       Bicep reflexes are 2+ on the right side and 2+ on the left side.       Brachioradialis reflexes are 2+ on the right side and 2+ on the left side.       Patellar reflexes are 2+ on the right side and 2+ on the left side.  Psychiatric: Her speech is normal.        Neurologic Exam     Mental Status   Speech: speech is normal   Level of consciousness: alert  Normal comprehension.     Cranial Nerves   Cranial nerves II through XII intact.     Motor Exam   Muscle bulk: normal  Overall muscle tone: normal    Strength   Strength 5/5 throughout.     Sensory Exam   Light touch normal.     Gait, Coordination, and Reflexes     Gait  Gait: normal    Coordination   Finger to nose coordination: normal  Heel to shin coordination: normal    Tremor   Resting tremor: absent    Reflexes   Right brachioradialis: 2+  Left brachioradialis: 2+  Right biceps: 2+  Left biceps: 2+  Right patellar: 2+  Left patellar: 2+          Assessment/Plan   Amie was seen today for headache.    Diagnoses and all orders for this visit:    Migraine with aura and without status migrainosus, not intractable          Discussion/Summary   Amie Sepulveda comes to clinic today with a history most consistent with migraines. This was discussed in detail. After discussing potential treatment options, it was elected to simply add Magnesium 400mg daily and Riboflavin 150mg BID given her current pregnancy. Following her pregnancy, we could consider other treatment options such as TPM or amitriptyline. She will then follow up in 6 weeks, or sooner if needed.   I spent 30 minutes face to face with the patient with 25 minutes spent on discussing diagnosis, prognosis, diagnostic testing, evaluation, current status, treatment options  and management as discussed above.         As part of this visit I reviewed prior lab results, reviewed radiology results and reviewed radiology images.      Marnie Posada PA-C

## 2020-07-30 ENCOUNTER — LAB (OUTPATIENT)
Dept: LAB | Facility: HOSPITAL | Age: 30
End: 2020-07-30

## 2020-07-30 ENCOUNTER — TRANSCRIBE ORDERS (OUTPATIENT)
Dept: LAB | Facility: HOSPITAL | Age: 30
End: 2020-07-30

## 2020-07-30 DIAGNOSIS — Z3A.28 28 WEEKS GESTATION OF PREGNANCY: ICD-10-CM

## 2020-07-30 DIAGNOSIS — Z34.83 PRENATAL CARE, SUBSEQUENT PREGNANCY, THIRD TRIMESTER: Primary | ICD-10-CM

## 2020-07-30 LAB
BLD GP AB SCN SERPL QL: NEGATIVE
DEPRECATED RDW RBC AUTO: 40.6 FL (ref 37–54)
ERYTHROCYTE [DISTWIDTH] IN BLOOD BY AUTOMATED COUNT: 12.5 % (ref 12.3–15.4)
GLUCOSE 1H P 100 G GLC PO SERPL-MCNC: 160 MG/DL (ref 65–140)
HCT VFR BLD AUTO: 32.5 % (ref 34–46.6)
HGB BLD-MCNC: 11.2 G/DL (ref 12–15.9)
MCH RBC QN AUTO: 30.7 PG (ref 26.6–33)
MCHC RBC AUTO-ENTMCNC: 34.5 G/DL (ref 31.5–35.7)
MCV RBC AUTO: 89 FL (ref 79–97)
PLATELET # BLD AUTO: 284 10*3/MM3 (ref 140–450)
PMV BLD AUTO: 11.9 FL (ref 6–12)
RBC # BLD AUTO: 3.65 10*6/MM3 (ref 3.77–5.28)
WBC # BLD AUTO: 10.96 10*3/MM3 (ref 3.4–10.8)

## 2020-07-30 PROCEDURE — 82950 GLUCOSE TEST: CPT | Performed by: NURSE PRACTITIONER

## 2020-07-30 PROCEDURE — 86850 RBC ANTIBODY SCREEN: CPT | Performed by: NURSE PRACTITIONER

## 2020-07-30 PROCEDURE — 85027 COMPLETE CBC AUTOMATED: CPT | Performed by: NURSE PRACTITIONER

## 2020-07-30 PROCEDURE — 36415 COLL VENOUS BLD VENIPUNCTURE: CPT | Performed by: NURSE PRACTITIONER

## 2020-08-03 ENCOUNTER — HOSPITAL ENCOUNTER (OUTPATIENT)
Dept: ULTRASOUND IMAGING | Facility: HOSPITAL | Age: 30
Discharge: HOME | End: 2020-08-03
Admitting: INTERNAL MEDICINE

## 2020-08-03 DIAGNOSIS — E80.6 HYPERBILIRUBINEMIA: ICD-10-CM

## 2020-08-03 PROCEDURE — 76705 ECHO EXAM OF ABDOMEN: CPT

## 2020-08-19 ENCOUNTER — LAB (OUTPATIENT)
Dept: LAB | Facility: HOSPITAL | Age: 30
End: 2020-08-19

## 2020-08-19 ENCOUNTER — TRANSCRIBE ORDERS (OUTPATIENT)
Dept: LAB | Facility: HOSPITAL | Age: 30
End: 2020-08-19

## 2020-08-19 DIAGNOSIS — Z34.83 PRENATAL CARE, SUBSEQUENT PREGNANCY, THIRD TRIMESTER: Primary | ICD-10-CM

## 2020-08-19 DIAGNOSIS — Z34.83 PRENATAL CARE, SUBSEQUENT PREGNANCY, THIRD TRIMESTER: ICD-10-CM

## 2020-08-19 DIAGNOSIS — Z3A.28 28 WEEKS GESTATION OF PREGNANCY: ICD-10-CM

## 2020-08-19 LAB
BLD GP AB SCN SERPL QL: NEGATIVE
DEPRECATED RDW RBC AUTO: 37.3 FL (ref 37–54)
ERYTHROCYTE [DISTWIDTH] IN BLOOD BY AUTOMATED COUNT: 12 % (ref 12.3–15.4)
GLUCOSE 1H P 100 G GLC PO SERPL-MCNC: 127 MG/DL (ref 74–180)
GLUCOSE 2H P 100 G GLC PO SERPL-MCNC: 126 MG/DL (ref 74–155)
GLUCOSE 3H P 100 G GLC PO SERPL-MCNC: 117 MG/DL (ref 74–140)
GLUCOSE P FAST SERPL-MCNC: 76 MG/DL (ref 74–106)
HCT VFR BLD AUTO: 31.7 % (ref 34–46.6)
HGB BLD-MCNC: 11 G/DL (ref 12–15.9)
MCH RBC QN AUTO: 29.6 PG (ref 26.6–33)
MCHC RBC AUTO-ENTMCNC: 34.7 G/DL (ref 31.5–35.7)
MCV RBC AUTO: 85.2 FL (ref 79–97)
PLATELET # BLD AUTO: 250 10*3/MM3 (ref 140–450)
PMV BLD AUTO: 11.6 FL (ref 6–12)
RBC # BLD AUTO: 3.72 10*6/MM3 (ref 3.77–5.28)
WBC # BLD AUTO: 10.74 10*3/MM3 (ref 3.4–10.8)

## 2020-08-19 PROCEDURE — 86850 RBC ANTIBODY SCREEN: CPT

## 2020-08-19 PROCEDURE — 85027 COMPLETE CBC AUTOMATED: CPT

## 2020-08-19 PROCEDURE — 36415 COLL VENOUS BLD VENIPUNCTURE: CPT

## 2020-08-19 PROCEDURE — 82951 GLUCOSE TOLERANCE TEST (GTT): CPT

## 2020-08-19 PROCEDURE — 82952 GTT-ADDED SAMPLES: CPT

## 2020-09-15 ENCOUNTER — HOSPITAL ENCOUNTER (EMERGENCY)
Facility: HOSPITAL | Age: 30
Discharge: HOME | End: 2020-09-15
Attending: EMERGENCY MEDICINE | Admitting: EMERGENCY MEDICINE

## 2020-09-15 VITALS
RESPIRATION RATE: 16 BRPM | WEIGHT: 127 LBS | BODY MASS INDEX: 23.37 KG/M2 | HEIGHT: 62 IN | OXYGEN SATURATION: 97 % | SYSTOLIC BLOOD PRESSURE: 102 MMHG | DIASTOLIC BLOOD PRESSURE: 70 MMHG | TEMPERATURE: 98.2 F | HEART RATE: 93 BPM

## 2020-09-15 DIAGNOSIS — R42 LIGHTHEADEDNESS: Primary | ICD-10-CM

## 2020-09-15 DIAGNOSIS — R00.2 PALPITATIONS: ICD-10-CM

## 2020-09-15 LAB
ALBUMIN SERPL-MCNC: 3.5 G/DL (ref 3.5–5.2)
ALBUMIN/GLOB SERPL: 1.3 G/DL
ALP SERPL-CCNC: 103 U/L (ref 39–117)
ALT SERPL W P-5'-P-CCNC: 9 U/L (ref 1–33)
ANION GAP SERPL CALCULATED.3IONS-SCNC: 13 MMOL/L (ref 5–15)
AST SERPL-CCNC: 17 U/L (ref 1–32)
BACTERIA UR QL AUTO: ABNORMAL /HPF
BASOPHILS # BLD AUTO: 0.03 10*3/MM3 (ref 0–0.2)
BASOPHILS NFR BLD AUTO: 0.3 % (ref 0–1.5)
BILIRUB SERPL-MCNC: 0.8 MG/DL (ref 0–1.2)
BILIRUB UR QL STRIP: NEGATIVE
BUN SERPL-MCNC: 6 MG/DL (ref 6–20)
BUN/CREAT SERPL: 12.5 (ref 7–25)
CALCIUM SPEC-SCNC: 8.6 MG/DL (ref 8.6–10.5)
CHLORIDE SERPL-SCNC: 103 MMOL/L (ref 98–107)
CLARITY UR: CLEAR
CO2 SERPL-SCNC: 19 MMOL/L (ref 22–29)
COLOR UR: YELLOW
CREAT SERPL-MCNC: 0.48 MG/DL (ref 0.57–1)
DEPRECATED RDW RBC AUTO: 41.5 FL (ref 37–54)
EOSINOPHIL # BLD AUTO: 0.09 10*3/MM3 (ref 0–0.4)
EOSINOPHIL NFR BLD AUTO: 0.8 % (ref 0.3–6.2)
ERYTHROCYTE [DISTWIDTH] IN BLOOD BY AUTOMATED COUNT: 12.8 % (ref 12.3–15.4)
GFR SERPL CREATININE-BSD FRML MDRD: 150 ML/MIN/1.73
GLOBULIN UR ELPH-MCNC: 2.8 GM/DL
GLUCOSE SERPL-MCNC: 87 MG/DL (ref 65–99)
GLUCOSE UR STRIP-MCNC: NEGATIVE MG/DL
HCT VFR BLD AUTO: 35.2 % (ref 34–46.6)
HGB BLD-MCNC: 11.5 G/DL (ref 12–15.9)
HGB UR QL STRIP.AUTO: NEGATIVE
HOLD SPECIMEN: NORMAL
HOLD SPECIMEN: NORMAL
HYALINE CASTS UR QL AUTO: ABNORMAL /LPF
IMM GRANULOCYTES # BLD AUTO: 0.23 10*3/MM3 (ref 0–0.05)
IMM GRANULOCYTES NFR BLD AUTO: 2 % (ref 0–0.5)
KETONES UR QL STRIP: NEGATIVE
LEUKOCYTE ESTERASE UR QL STRIP.AUTO: ABNORMAL
LYMPHOCYTES # BLD AUTO: 1.65 10*3/MM3 (ref 0.7–3.1)
LYMPHOCYTES NFR BLD AUTO: 14.4 % (ref 19.6–45.3)
MAGNESIUM SERPL-MCNC: 2.1 MG/DL (ref 1.6–2.6)
MCH RBC QN AUTO: 28.8 PG (ref 26.6–33)
MCHC RBC AUTO-ENTMCNC: 32.7 G/DL (ref 31.5–35.7)
MCV RBC AUTO: 88.2 FL (ref 79–97)
MONOCYTES # BLD AUTO: 0.93 10*3/MM3 (ref 0.1–0.9)
MONOCYTES NFR BLD AUTO: 8.1 % (ref 5–12)
NEUTROPHILS NFR BLD AUTO: 74.4 % (ref 42.7–76)
NEUTROPHILS NFR BLD AUTO: 8.49 10*3/MM3 (ref 1.7–7)
NITRITE UR QL STRIP: NEGATIVE
NRBC BLD AUTO-RTO: 0 /100 WBC (ref 0–0.2)
PH UR STRIP.AUTO: 8 [PH] (ref 5–8)
PLATELET # BLD AUTO: 279 10*3/MM3 (ref 140–450)
PMV BLD AUTO: 11.8 FL (ref 6–12)
POTASSIUM SERPL-SCNC: 3.7 MMOL/L (ref 3.5–5.2)
PROT SERPL-MCNC: 6.3 G/DL (ref 6–8.5)
PROT UR QL STRIP: ABNORMAL
RBC # BLD AUTO: 3.99 10*6/MM3 (ref 3.77–5.28)
RBC # UR: ABNORMAL /HPF
REF LAB TEST METHOD: ABNORMAL
SODIUM SERPL-SCNC: 135 MMOL/L (ref 136–145)
SP GR UR STRIP: 1.01 (ref 1–1.03)
SQUAMOUS #/AREA URNS HPF: ABNORMAL /HPF
TROPONIN T SERPL-MCNC: <0.01 NG/ML (ref 0–0.03)
TSH SERPL DL<=0.05 MIU/L-ACNC: 1.1 UIU/ML (ref 0.27–4.2)
UROBILINOGEN UR QL STRIP: ABNORMAL
WBC # BLD AUTO: 11.42 10*3/MM3 (ref 3.4–10.8)
WBC UR QL AUTO: ABNORMAL /HPF
WHOLE BLOOD HOLD SPECIMEN: NORMAL
WHOLE BLOOD HOLD SPECIMEN: NORMAL

## 2020-09-15 PROCEDURE — 85025 COMPLETE CBC W/AUTO DIFF WBC: CPT

## 2020-09-15 PROCEDURE — 93005 ELECTROCARDIOGRAM TRACING: CPT | Performed by: EMERGENCY MEDICINE

## 2020-09-15 PROCEDURE — 84443 ASSAY THYROID STIM HORMONE: CPT | Performed by: EMERGENCY MEDICINE

## 2020-09-15 PROCEDURE — 81001 URINALYSIS AUTO W/SCOPE: CPT | Performed by: EMERGENCY MEDICINE

## 2020-09-15 PROCEDURE — 93005 ELECTROCARDIOGRAM TRACING: CPT

## 2020-09-15 PROCEDURE — 83735 ASSAY OF MAGNESIUM: CPT | Performed by: EMERGENCY MEDICINE

## 2020-09-15 PROCEDURE — 80053 COMPREHEN METABOLIC PANEL: CPT | Performed by: EMERGENCY MEDICINE

## 2020-09-15 PROCEDURE — 99284 EMERGENCY DEPT VISIT MOD MDM: CPT

## 2020-09-15 PROCEDURE — 84484 ASSAY OF TROPONIN QUANT: CPT | Performed by: EMERGENCY MEDICINE

## 2020-09-15 RX ORDER — SODIUM CHLORIDE 0.9 % (FLUSH) 0.9 %
10 SYRINGE (ML) INJECTION AS NEEDED
Status: DISCONTINUED | OUTPATIENT
Start: 2020-09-15 | End: 2020-09-15 | Stop reason: HOSPADM

## 2020-09-15 RX ADMIN — SODIUM CHLORIDE 1000 ML: 9 INJECTION, SOLUTION INTRAVENOUS at 14:19

## 2020-09-17 NOTE — ED PROVIDER NOTES
"Subjective   Pt is a pleasant 30 year old female, 32 wks pregnant, who presents with lightheadedness and vague palpitations.  She states that she was at work today when she began to feel \"foggy\" headed, requiring her to sit down.  He denies any tunnel vision, hearing changes, or near syncope.  She did experience mild palpitations during the episode.  Denies chest pain.  Denies any recent illness.  She was feeling normal throughout the day yesterday and this morning prior to work.  Normal PO intake today.  Although she has experienced lightheadedness in the past, it was not been to this severity, and today's symptoms are still mildly present.  Denies fever, chills chest pain, abd pain, pelvic pain, vaginal bleeding/fluid/DC, n/v/d, or other acute complaints.          Dizziness  Quality:  Lightheadedness  Severity:  Moderate  Onset quality:  Sudden  Timing:  Constant  Progression:  Partially resolved  Chronicity:  New  Context: not when bending over, not with bowel movement, not with ear pain, not with eye movement, not with head movement, not with inactivity and not with loss of consciousness    Context comment:  Pt was performing her normal duties at work.  Nothing out of the usual.  Relieved by:  Nothing  Worsened by:  Nothing  Ineffective treatments:  None tried  Associated symptoms: nausea, palpitations and weakness    Associated symptoms: no chest pain, no shortness of breath, no tinnitus and no vision changes        Review of Systems   HENT: Negative for tinnitus.    Respiratory: Negative for shortness of breath.    Cardiovascular: Positive for palpitations. Negative for chest pain.   Gastrointestinal: Positive for nausea.   Neurological: Positive for dizziness and weakness.   All other systems reviewed and are negative.      Past Medical History:   Diagnosis Date   • Anxiety    • Depression        Allergies   Allergen Reactions   • Penicillins Rash       Past Surgical History:   Procedure Laterality Date   • " APPENDECTOMY     • DILATATION AND CURETTAGE     • ULNAR NERVE TRANSPOSITION     • WISDOM TOOTH EXTRACTION         No family history on file.    Social History     Socioeconomic History   • Marital status:      Spouse name: Not on file   • Number of children: Not on file   • Years of education: Not on file   • Highest education level: Not on file   Tobacco Use   • Smoking status: Never Smoker   • Smokeless tobacco: Never Used   Substance and Sexual Activity   • Alcohol use: No   • Drug use: No   • Sexual activity: Yes     Partners: Male           Objective   Physical Exam  Vitals signs and nursing note reviewed.   Constitutional:       General: She is not in acute distress.     Appearance: Normal appearance. She is normal weight.   HENT:      Head: Normocephalic and atraumatic.   Eyes:      Conjunctiva/sclera: Conjunctivae normal.      Pupils: Pupils are equal, round, and reactive to light.   Neck:      Musculoskeletal: Normal range of motion and neck supple.      Thyroid: No thyromegaly.   Cardiovascular:      Rate and Rhythm: Normal rate and regular rhythm.      Heart sounds: Normal heart sounds. No murmur. No friction rub. No gallop.    Pulmonary:      Effort: Pulmonary effort is normal. No respiratory distress.      Breath sounds: Normal breath sounds.   Abdominal:      General: Bowel sounds are normal.      Palpations: Abdomen is soft.      Tenderness: There is no abdominal tenderness. There is no guarding.      Comments: Uterus consistent with dates.   Musculoskeletal: Normal range of motion.   Lymphadenopathy:      Cervical: No cervical adenopathy.   Skin:     General: Skin is warm and dry.      Capillary Refill: Capillary refill takes less than 2 seconds.   Neurological:      Mental Status: She is alert and oriented to person, place, and time.         Procedures           ED Course  ED Course as of Sep 17 0325   Tue Sep 15, 2020   1701 Patient has not experienced any additional episodes of  lightheadedness during her emergency department stay.  She has been given a fluid bolus.  I encouraged her to make a conscious effort to drink plenty of fluids at home.  She will take off the left next couple of days to rest.  She will follow-up at the arrhythmia clinic and with her OB/GYN, and have a low threshold to return to the emergency department if her symptoms return or other concerns arise.    It is reassuring that pt is in a normal sinus rhythm upon despite being mildly symptomatic at the time of my initial examination.    She is smiling, interactive, and reliable follow up, and is ready for discharge at this time.    [CP]      ED Course User Index  [CP] Sunday Lindsay, DO      Results for YASMEEN SANDOVAL (MRN [id number]) as of 9/17/2020 03:26   Ref. Range 9/15/2020 13:15   Troponin T Latest Ref Range: 0.000 - 0.030 ng/mL <0.010   Glucose Latest Ref Range: 65 - 99 mg/dL 87   Sodium Latest Ref Range: 136 - 145 mmol/L 135 (L)   Potassium Latest Ref Range: 3.5 - 5.2 mmol/L 3.7   CO2 Latest Ref Range: 22.0 - 29.0 mmol/L 19.0 (L)   Chloride Latest Ref Range: 98 - 107 mmol/L 103   Anion Gap Latest Ref Range: 5.0 - 15.0 mmol/L 13.0   Creatinine Latest Ref Range: 0.57 - 1.00 mg/dL 0.48 (L)   BUN Latest Ref Range: 6 - 20 mg/dL 6   BUN/Creatinine Ratio Latest Ref Range: 7.0 - 25.0  12.5   Calcium Latest Ref Range: 8.6 - 10.5 mg/dL 8.6   eGFR Non  Am Latest Ref Range: >60 mL/min/1.73 150   Alkaline Phosphatase Latest Ref Range: 39 - 117 U/L 103   Total Protein Latest Ref Range: 6.0 - 8.5 g/dL 6.3   ALT (SGPT) Latest Ref Range: 1 - 33 U/L 9   AST (SGOT) Latest Ref Range: 1 - 32 U/L 17   Total Bilirubin Latest Ref Range: 0.0 - 1.2 mg/dL 0.8   Albumin Latest Ref Range: 3.50 - 5.20 g/dL 3.50   Globulin Latest Units: gm/dL 2.8   A/G Ratio Latest Units: g/dL 1.3   TSH Baseline Latest Ref Range: 0.270 - 4.200 uIU/mL 1.100   Magnesium Latest Ref Range: 1.6 - 2.6 mg/dL 2.1   WBC Latest Ref Range: 3.40 - 10.80  10*3/mm3 11.42 (H)   RBC Latest Ref Range: 3.77 - 5.28 10*6/mm3 3.99   Hemoglobin Latest Ref Range: 12.0 - 15.9 g/dL 11.5 (L)   Hematocrit Latest Ref Range: 34.0 - 46.6 % 35.2   RDW Latest Ref Range: 12.3 - 15.4 % 12.8   MCV Latest Ref Range: 79.0 - 97.0 fL 88.2   MCH Latest Ref Range: 26.6 - 33.0 pg 28.8   MCHC Latest Ref Range: 31.5 - 35.7 g/dL 32.7   MPV Latest Ref Range: 6.0 - 12.0 fL 11.8   Platelets Latest Ref Range: 140 - 450 10*3/mm3 279   RDW-SD Latest Ref Range: 37.0 - 54.0 fl 41.5   Neutrophil Rel % Latest Ref Range: 42.7 - 76.0 % 74.4   Lymphocyte Rel % Latest Ref Range: 19.6 - 45.3 % 14.4 (L)   Monocyte Rel % Latest Ref Range: 5.0 - 12.0 % 8.1   Eosinophil Rel % Latest Ref Range: 0.3 - 6.2 % 0.8   Basophil Rel % Latest Ref Range: 0.0 - 1.5 % 0.3   Immature Granulocyte Rel % Latest Ref Range: 0.0 - 0.5 % 2.0 (H)   Neutrophils Absolute Latest Ref Range: 1.70 - 7.00 10*3/mm3 8.49 (H)   Lymphocytes Absolute Latest Ref Range: 0.70 - 3.10 10*3/mm3 1.65   Monocytes Absolute Latest Ref Range: 0.10 - 0.90 10*3/mm3 0.93 (H)   Eosinophils Absolute Latest Ref Range: 0.00 - 0.40 10*3/mm3 0.09   Basophils Absolute Latest Ref Range: 0.00 - 0.20 10*3/mm3 0.03   Immature Grans, Absolute Latest Ref Range: 0.00 - 0.05 10*3/mm3 0.23 (H)   nRBC Latest Ref Range: 0.0 - 0.2 /100 WBC 0.0     Results for YASMEEN SANDOVAL (MRN [id number]) as of 9/17/2020 03:26   Ref. Range 9/15/2020 15:37   Color, UA Latest Ref Range: Yellow, Straw  Yellow   Appearance, UA Latest Ref Range: Clear  Clear   Specific [place], UA Latest Ref Range: 1.001 - 1.030  1.012   pH, UA Latest Ref Range: 5.0 - 8.0  8.0   Glucose Latest Ref Range: Negative  Negative   Ketones, UA Latest Ref Range: Negative  Negative   Blood, UA Latest Ref Range: Negative  Negative   Nitrite, UA Latest Ref Range: Negative  Negative   Leukocytes, UA Latest Ref Range: Negative  Small (1+) (A)   Protein, UA Latest Ref Range: Negative  30 mg/dL (1+) (A)   Bilirubin, UA Latest  Ref Range: Negative  Negative   Urobilinogen, UA Latest Ref Range: 0.2 - 1.0 E.U./dL  1.0 E.U./dL   RBC, UA Latest Ref Range: None Seen, 0-2 /HPF 0-2   WBC, UA Latest Ref Range: None Seen, 0-2 /HPF 3-5 (A)   Bacteria, UA Latest Ref Range: None Seen, Trace /HPF None Seen   Squamous Epithelial Cells, UA Latest Ref Range: None Seen, 0-2 /HPF 7-12 (A)   Hyaline Casts, UA Latest Ref Range: 0 - 6 /LPF 0-6   Methodology: Unknown Automated Microscopy           MDM    Final diagnoses:   Lightheadedness   Palpitations            Sunday Lindsay, DO  09/17/20 0327

## 2020-09-25 LAB — EXTERNAL GROUP B STREP ANTIGEN: POSITIVE

## 2020-10-17 ENCOUNTER — PREP FOR SURGERY (OUTPATIENT)
Dept: OTHER | Facility: HOSPITAL | Age: 30
End: 2020-10-17

## 2020-10-17 DIAGNOSIS — Z34.90 TERM PREGNANCY: Primary | ICD-10-CM

## 2020-10-17 DIAGNOSIS — Z79.899 LONG TERM USE OF DRUG: ICD-10-CM

## 2020-10-17 RX ORDER — CARBOPROST TROMETHAMINE 250 UG/ML
250 INJECTION, SOLUTION INTRAMUSCULAR AS NEEDED
Status: CANCELLED | OUTPATIENT
Start: 2020-10-17

## 2020-10-17 RX ORDER — OXYTOCIN-SODIUM CHLORIDE 0.9% IV SOLN 30 UNIT/500ML 30-0.9/5 UT/ML-%
85 SOLUTION INTRAVENOUS ONCE
Status: CANCELLED | OUTPATIENT
Start: 2020-10-17 | End: 2020-10-17

## 2020-10-17 RX ORDER — OXYTOCIN-SODIUM CHLORIDE 0.9% IV SOLN 30 UNIT/500ML 30-0.9/5 UT/ML-%
650 SOLUTION INTRAVENOUS ONCE
Status: CANCELLED | OUTPATIENT
Start: 2020-10-17 | End: 2020-10-17

## 2020-10-17 RX ORDER — ONDANSETRON 2 MG/ML
4 INJECTION INTRAMUSCULAR; INTRAVENOUS EVERY 6 HOURS PRN
Status: CANCELLED | OUTPATIENT
Start: 2020-10-17

## 2020-10-17 RX ORDER — CEFAZOLIN SODIUM 1 G/50ML
1 INJECTION, SOLUTION INTRAVENOUS EVERY 8 HOURS
Status: CANCELLED | OUTPATIENT
Start: 2020-10-18 | End: 2020-10-19

## 2020-10-17 RX ORDER — OXYTOCIN-SODIUM CHLORIDE 0.9% IV SOLN 30 UNIT/500ML 30-0.9/5 UT/ML-%
2-24 SOLUTION INTRAVENOUS
Status: CANCELLED | OUTPATIENT
Start: 2020-10-18

## 2020-10-17 RX ORDER — SODIUM CHLORIDE 0.9 % (FLUSH) 0.9 %
3 SYRINGE (ML) INJECTION EVERY 12 HOURS SCHEDULED
Status: CANCELLED | OUTPATIENT
Start: 2020-10-17

## 2020-10-17 RX ORDER — IBUPROFEN 600 MG/1
600 TABLET ORAL EVERY 6 HOURS PRN
Status: CANCELLED | OUTPATIENT
Start: 2020-10-17

## 2020-10-17 RX ORDER — ACETAMINOPHEN 325 MG/1
650 TABLET ORAL EVERY 4 HOURS PRN
Status: CANCELLED | OUTPATIENT
Start: 2020-10-17

## 2020-10-17 RX ORDER — TERBUTALINE SULFATE 1 MG/ML
0.25 INJECTION, SOLUTION SUBCUTANEOUS AS NEEDED
Status: CANCELLED | OUTPATIENT
Start: 2020-10-17

## 2020-10-17 RX ORDER — METHYLERGONOVINE MALEATE 0.2 MG/ML
200 INJECTION INTRAVENOUS ONCE AS NEEDED
Status: CANCELLED | OUTPATIENT
Start: 2020-10-17

## 2020-10-17 RX ORDER — SODIUM CHLORIDE, SODIUM LACTATE, POTASSIUM CHLORIDE, CALCIUM CHLORIDE 600; 310; 30; 20 MG/100ML; MG/100ML; MG/100ML; MG/100ML
125 INJECTION, SOLUTION INTRAVENOUS CONTINUOUS
Status: CANCELLED | OUTPATIENT
Start: 2020-10-17

## 2020-10-17 RX ORDER — SODIUM CHLORIDE 0.9 % (FLUSH) 0.9 %
1-10 SYRINGE (ML) INJECTION AS NEEDED
Status: CANCELLED | OUTPATIENT
Start: 2020-10-17

## 2020-10-17 RX ORDER — BUTORPHANOL TARTRATE 1 MG/ML
1 INJECTION, SOLUTION INTRAMUSCULAR; INTRAVENOUS
Status: CANCELLED | OUTPATIENT
Start: 2020-10-17

## 2020-10-17 RX ORDER — ONDANSETRON 4 MG/1
4 TABLET, FILM COATED ORAL EVERY 6 HOURS PRN
Status: CANCELLED | OUTPATIENT
Start: 2020-10-17

## 2020-10-17 RX ORDER — CEFAZOLIN SODIUM 2 G/100ML
2 INJECTION, SOLUTION INTRAVENOUS ONCE
Status: CANCELLED | OUTPATIENT
Start: 2020-10-17 | End: 2020-10-17

## 2020-10-17 RX ORDER — MISOPROSTOL 200 UG/1
800 TABLET ORAL AS NEEDED
Status: CANCELLED | OUTPATIENT
Start: 2020-10-17

## 2020-10-17 RX ORDER — MAGNESIUM CARB/ALUMINUM HYDROX 105-160MG
30 TABLET,CHEWABLE ORAL ONCE
Status: CANCELLED | OUTPATIENT
Start: 2020-10-17 | End: 2020-10-17

## 2020-10-17 RX ORDER — LIDOCAINE HYDROCHLORIDE 10 MG/ML
5 INJECTION, SOLUTION EPIDURAL; INFILTRATION; INTRACAUDAL; PERINEURAL AS NEEDED
Status: CANCELLED | OUTPATIENT
Start: 2020-10-17

## 2020-10-18 ENCOUNTER — APPOINTMENT (OUTPATIENT)
Dept: PREADMISSION TESTING | Facility: HOSPITAL | Age: 30
End: 2020-10-18

## 2020-10-18 ENCOUNTER — HOSPITAL ENCOUNTER (OUTPATIENT)
Facility: HOSPITAL | Age: 30
End: 2020-10-18

## 2020-10-18 LAB — EXTERNAL GROUP B STREP ANTIGEN: POSITIVE

## 2020-10-18 PROCEDURE — C9803 HOPD COVID-19 SPEC COLLECT: HCPCS

## 2020-10-18 PROCEDURE — U0004 COV-19 TEST NON-CDC HGH THRU: HCPCS

## 2020-10-19 LAB — SARS-COV-2 RNA RESP QL NAA+PROBE: NOT DETECTED

## 2020-10-21 ENCOUNTER — ANESTHESIA EVENT (OUTPATIENT)
Dept: LABOR AND DELIVERY | Facility: HOSPITAL | Age: 30
End: 2020-10-21

## 2020-10-21 ENCOUNTER — ANESTHESIA (OUTPATIENT)
Dept: LABOR AND DELIVERY | Facility: HOSPITAL | Age: 30
End: 2020-10-21

## 2020-10-21 ENCOUNTER — HOSPITAL ENCOUNTER (INPATIENT)
Dept: LABOR AND DELIVERY | Facility: HOSPITAL | Age: 30
LOS: 2 days | Discharge: HOME | End: 2020-10-23
Attending: OBSTETRICS & GYNECOLOGY | Admitting: OBSTETRICS & GYNECOLOGY

## 2020-10-21 DIAGNOSIS — Z34.90 TERM PREGNANCY: ICD-10-CM

## 2020-10-21 LAB
ABO GROUP BLD: NORMAL
BLD GP AB SCN SERPL QL: NEGATIVE
DEPRECATED RDW RBC AUTO: 44.4 FL (ref 37–54)
ERYTHROCYTE [DISTWIDTH] IN BLOOD BY AUTOMATED COUNT: 14.1 % (ref 12.3–15.4)
HCT VFR BLD AUTO: 36.1 % (ref 34–46.6)
HGB BLD-MCNC: 11.8 G/DL (ref 12–15.9)
MCH RBC QN AUTO: 28.4 PG (ref 26.6–33)
MCHC RBC AUTO-ENTMCNC: 32.7 G/DL (ref 31.5–35.7)
MCV RBC AUTO: 87 FL (ref 79–97)
PLATELET # BLD AUTO: 295 10*3/MM3 (ref 140–450)
PMV BLD AUTO: 12.4 FL (ref 6–12)
RBC # BLD AUTO: 4.15 10*6/MM3 (ref 3.77–5.28)
RH BLD: POSITIVE
T&S EXPIRATION DATE: NORMAL
WBC # BLD AUTO: 10.58 10*3/MM3 (ref 3.4–10.8)

## 2020-10-21 PROCEDURE — 86900 BLOOD TYPING SEROLOGIC ABO: CPT | Performed by: OBSTETRICS & GYNECOLOGY

## 2020-10-21 PROCEDURE — 25010000003 CEFAZOLIN IN DEXTROSE 2-4 GM/100ML-% SOLUTION: Performed by: OBSTETRICS & GYNECOLOGY

## 2020-10-21 PROCEDURE — 51703 INSERT BLADDER CATH COMPLEX: CPT

## 2020-10-21 PROCEDURE — 86850 RBC ANTIBODY SCREEN: CPT | Performed by: OBSTETRICS & GYNECOLOGY

## 2020-10-21 PROCEDURE — 25010000002 ROPIVACAINE PER 1 MG: Performed by: NURSE ANESTHETIST, CERTIFIED REGISTERED

## 2020-10-21 PROCEDURE — C1755 CATHETER, INTRASPINAL: HCPCS

## 2020-10-21 PROCEDURE — 85027 COMPLETE CBC AUTOMATED: CPT | Performed by: OBSTETRICS & GYNECOLOGY

## 2020-10-21 PROCEDURE — 6A550ZT PHERESIS OF CORD BLOOD STEM CELLS, SINGLE: ICD-10-PCS | Performed by: OBSTETRICS & GYNECOLOGY

## 2020-10-21 PROCEDURE — 0HQ9XZZ REPAIR PERINEUM SKIN, EXTERNAL APPROACH: ICD-10-PCS | Performed by: OBSTETRICS & GYNECOLOGY

## 2020-10-21 PROCEDURE — 63710000001 ONDANSETRON PER 8 MG: Performed by: OBSTETRICS & GYNECOLOGY

## 2020-10-21 PROCEDURE — 25010000002 FENTANYL CITRATE (PF) 100 MCG/2ML SOLUTION: Performed by: NURSE ANESTHETIST, CERTIFIED REGISTERED

## 2020-10-21 PROCEDURE — C1755 CATHETER, INTRASPINAL: HCPCS | Performed by: ANESTHESIOLOGY

## 2020-10-21 PROCEDURE — 86901 BLOOD TYPING SEROLOGIC RH(D): CPT | Performed by: OBSTETRICS & GYNECOLOGY

## 2020-10-21 PROCEDURE — 25010000002 ONDANSETRON PER 1 MG: Performed by: OBSTETRICS & GYNECOLOGY

## 2020-10-21 RX ORDER — CEFAZOLIN SODIUM 2 G/100ML
2 INJECTION, SOLUTION INTRAVENOUS ONCE
Status: COMPLETED | OUTPATIENT
Start: 2020-10-21 | End: 2020-10-21

## 2020-10-21 RX ORDER — ACETAMINOPHEN 325 MG/1
650 TABLET ORAL EVERY 4 HOURS PRN
Status: DISCONTINUED | OUTPATIENT
Start: 2020-10-21 | End: 2020-10-21

## 2020-10-21 RX ORDER — DIPHENHYDRAMINE HYDROCHLORIDE 50 MG/ML
12.5 INJECTION INTRAMUSCULAR; INTRAVENOUS EVERY 8 HOURS PRN
Status: DISCONTINUED | OUTPATIENT
Start: 2020-10-21 | End: 2020-10-21

## 2020-10-21 RX ORDER — OXYTOCIN-SODIUM CHLORIDE 0.9% IV SOLN 30 UNIT/500ML 30-0.9/5 UT/ML-%
85 SOLUTION INTRAVENOUS ONCE
Status: DISCONTINUED | OUTPATIENT
Start: 2020-10-21 | End: 2020-10-21 | Stop reason: HOSPADM

## 2020-10-21 RX ORDER — METOCLOPRAMIDE HYDROCHLORIDE 5 MG/ML
10 INJECTION INTRAMUSCULAR; INTRAVENOUS ONCE AS NEEDED
Status: DISCONTINUED | OUTPATIENT
Start: 2020-10-21 | End: 2020-10-21

## 2020-10-21 RX ORDER — EPHEDRINE SULFATE 5 MG/ML
5 INJECTION INTRAVENOUS
Status: DISCONTINUED | OUTPATIENT
Start: 2020-10-21 | End: 2020-10-21

## 2020-10-21 RX ORDER — ONDANSETRON 2 MG/ML
4 INJECTION INTRAMUSCULAR; INTRAVENOUS ONCE AS NEEDED
Status: DISCONTINUED | OUTPATIENT
Start: 2020-10-21 | End: 2020-10-21

## 2020-10-21 RX ORDER — SODIUM CHLORIDE 0.9 % (FLUSH) 0.9 %
1-10 SYRINGE (ML) INJECTION AS NEEDED
Status: DISCONTINUED | OUTPATIENT
Start: 2020-10-21 | End: 2020-10-23 | Stop reason: HOSPADM

## 2020-10-21 RX ORDER — FERROUS SULFATE 325(65) MG
325 TABLET ORAL
COMMUNITY
End: 2020-10-23 | Stop reason: HOSPADM

## 2020-10-21 RX ORDER — SIMETHICONE 80 MG
80 TABLET,CHEWABLE ORAL 4 TIMES DAILY PRN
Status: DISCONTINUED | OUTPATIENT
Start: 2020-10-21 | End: 2020-10-23 | Stop reason: HOSPADM

## 2020-10-21 RX ORDER — FAMOTIDINE 10 MG/ML
20 INJECTION, SOLUTION INTRAVENOUS ONCE AS NEEDED
Status: DISCONTINUED | OUTPATIENT
Start: 2020-10-21 | End: 2020-10-21

## 2020-10-21 RX ORDER — OXYTOCIN-SODIUM CHLORIDE 0.9% IV SOLN 30 UNIT/500ML 30-0.9/5 UT/ML-%
650 SOLUTION INTRAVENOUS ONCE
Status: DISCONTINUED | OUTPATIENT
Start: 2020-10-21 | End: 2020-10-21 | Stop reason: HOSPADM

## 2020-10-21 RX ORDER — ROPIVACAINE HYDROCHLORIDE 2 MG/ML
15 INJECTION, SOLUTION EPIDURAL; INFILTRATION; PERINEURAL CONTINUOUS
Status: DISCONTINUED | OUTPATIENT
Start: 2020-10-21 | End: 2020-10-21

## 2020-10-21 RX ORDER — ONDANSETRON 2 MG/ML
4 INJECTION INTRAMUSCULAR; INTRAVENOUS EVERY 6 HOURS PRN
Status: DISCONTINUED | OUTPATIENT
Start: 2020-10-21 | End: 2020-10-23 | Stop reason: HOSPADM

## 2020-10-21 RX ORDER — METHYLERGONOVINE MALEATE 0.2 MG/ML
200 INJECTION INTRAVENOUS ONCE AS NEEDED
Status: DISCONTINUED | OUTPATIENT
Start: 2020-10-21 | End: 2020-10-21 | Stop reason: HOSPADM

## 2020-10-21 RX ORDER — TRISODIUM CITRATE DIHYDRATE AND CITRIC ACID MONOHYDRATE 500; 334 MG/5ML; MG/5ML
30 SOLUTION ORAL ONCE
Status: DISCONTINUED | OUTPATIENT
Start: 2020-10-21 | End: 2020-10-21

## 2020-10-21 RX ORDER — BISACODYL 10 MG
10 SUPPOSITORY, RECTAL RECTAL DAILY PRN
Status: DISCONTINUED | OUTPATIENT
Start: 2020-10-22 | End: 2020-10-23 | Stop reason: HOSPADM

## 2020-10-21 RX ORDER — FENTANYL CITRATE 50 UG/ML
INJECTION, SOLUTION INTRAMUSCULAR; INTRAVENOUS AS NEEDED
Status: DISCONTINUED | OUTPATIENT
Start: 2020-10-21 | End: 2020-10-21 | Stop reason: SURG

## 2020-10-21 RX ORDER — LANOLIN
CREAM (ML) TOPICAL
Status: DISCONTINUED | OUTPATIENT
Start: 2020-10-21 | End: 2020-10-23 | Stop reason: HOSPADM

## 2020-10-21 RX ORDER — BUTORPHANOL TARTRATE 1 MG/ML
1 INJECTION, SOLUTION INTRAMUSCULAR; INTRAVENOUS
Status: DISCONTINUED | OUTPATIENT
Start: 2020-10-21 | End: 2020-10-21

## 2020-10-21 RX ORDER — HYDROCORTISONE 25 MG/G
1 CREAM TOPICAL AS NEEDED
Status: DISCONTINUED | OUTPATIENT
Start: 2020-10-21 | End: 2020-10-23 | Stop reason: HOSPADM

## 2020-10-21 RX ORDER — CEFAZOLIN SODIUM 1 G/50ML
1 INJECTION, SOLUTION INTRAVENOUS EVERY 8 HOURS
Status: DISCONTINUED | OUTPATIENT
Start: 2020-10-21 | End: 2020-10-21

## 2020-10-21 RX ORDER — SODIUM CHLORIDE 0.9 % (FLUSH) 0.9 %
3 SYRINGE (ML) INJECTION EVERY 12 HOURS SCHEDULED
Status: DISCONTINUED | OUTPATIENT
Start: 2020-10-21 | End: 2020-10-21

## 2020-10-21 RX ORDER — SODIUM CHLORIDE, SODIUM LACTATE, POTASSIUM CHLORIDE, CALCIUM CHLORIDE 600; 310; 30; 20 MG/100ML; MG/100ML; MG/100ML; MG/100ML
125 INJECTION, SOLUTION INTRAVENOUS CONTINUOUS
Status: DISCONTINUED | OUTPATIENT
Start: 2020-10-21 | End: 2020-10-21

## 2020-10-21 RX ORDER — SODIUM CHLORIDE 0.9 % (FLUSH) 0.9 %
1-10 SYRINGE (ML) INJECTION AS NEEDED
Status: DISCONTINUED | OUTPATIENT
Start: 2020-10-21 | End: 2020-10-21

## 2020-10-21 RX ORDER — ONDANSETRON 4 MG/1
4 TABLET, FILM COATED ORAL EVERY 6 HOURS PRN
Status: DISCONTINUED | OUTPATIENT
Start: 2020-10-21 | End: 2020-10-23 | Stop reason: HOSPADM

## 2020-10-21 RX ORDER — OXYTOCIN-SODIUM CHLORIDE 0.9% IV SOLN 30 UNIT/500ML 30-0.9/5 UT/ML-%
2-24 SOLUTION INTRAVENOUS
Status: DISCONTINUED | OUTPATIENT
Start: 2020-10-22 | End: 2020-10-21

## 2020-10-21 RX ORDER — MAGNESIUM CARB/ALUMINUM HYDROX 105-160MG
30 TABLET,CHEWABLE ORAL ONCE
Status: DISCONTINUED | OUTPATIENT
Start: 2020-10-21 | End: 2020-10-21

## 2020-10-21 RX ORDER — DOCUSATE SODIUM 100 MG/1
100 CAPSULE, LIQUID FILLED ORAL 2 TIMES DAILY PRN
Status: DISCONTINUED | OUTPATIENT
Start: 2020-10-21 | End: 2020-10-23 | Stop reason: HOSPADM

## 2020-10-21 RX ORDER — ONDANSETRON 4 MG/1
4 TABLET, FILM COATED ORAL EVERY 6 HOURS PRN
Status: DISCONTINUED | OUTPATIENT
Start: 2020-10-21 | End: 2020-10-21

## 2020-10-21 RX ORDER — LIDOCAINE HYDROCHLORIDE AND EPINEPHRINE 15; 5 MG/ML; UG/ML
INJECTION, SOLUTION EPIDURAL AS NEEDED
Status: DISCONTINUED | OUTPATIENT
Start: 2020-10-21 | End: 2020-10-21 | Stop reason: SURG

## 2020-10-21 RX ORDER — ACETAMINOPHEN 325 MG/1
650 TABLET ORAL EVERY 4 HOURS PRN
Status: DISCONTINUED | OUTPATIENT
Start: 2020-10-21 | End: 2020-10-23 | Stop reason: HOSPADM

## 2020-10-21 RX ORDER — IBUPROFEN 600 MG/1
600 TABLET ORAL EVERY 6 HOURS PRN
Status: DISCONTINUED | OUTPATIENT
Start: 2020-10-21 | End: 2020-10-23 | Stop reason: HOSPADM

## 2020-10-21 RX ORDER — FLUOXETINE HYDROCHLORIDE 20 MG/1
40 CAPSULE ORAL DAILY
Status: DISCONTINUED | OUTPATIENT
Start: 2020-10-21 | End: 2020-10-23 | Stop reason: HOSPADM

## 2020-10-21 RX ORDER — MISOPROSTOL 200 UG/1
800 TABLET ORAL AS NEEDED
Status: DISCONTINUED | OUTPATIENT
Start: 2020-10-21 | End: 2020-10-21 | Stop reason: HOSPADM

## 2020-10-21 RX ORDER — DOCUSATE SODIUM 100 MG/1
100 CAPSULE, LIQUID FILLED ORAL 2 TIMES DAILY
Status: DISCONTINUED | OUTPATIENT
Start: 2020-10-21 | End: 2020-10-23 | Stop reason: HOSPADM

## 2020-10-21 RX ORDER — TERBUTALINE SULFATE 1 MG/ML
0.25 INJECTION, SOLUTION SUBCUTANEOUS AS NEEDED
Status: DISCONTINUED | OUTPATIENT
Start: 2020-10-21 | End: 2020-10-21

## 2020-10-21 RX ORDER — PRENATAL VIT/IRON FUM/FOLIC AC 27MG-0.8MG
1 TABLET ORAL DAILY
Status: DISCONTINUED | OUTPATIENT
Start: 2020-10-21 | End: 2020-10-23 | Stop reason: HOSPADM

## 2020-10-21 RX ORDER — LIDOCAINE HYDROCHLORIDE 10 MG/ML
5 INJECTION, SOLUTION EPIDURAL; INFILTRATION; INTRACAUDAL; PERINEURAL AS NEEDED
Status: DISCONTINUED | OUTPATIENT
Start: 2020-10-21 | End: 2020-10-21

## 2020-10-21 RX ORDER — ONDANSETRON 2 MG/ML
4 INJECTION INTRAMUSCULAR; INTRAVENOUS EVERY 6 HOURS PRN
Status: DISCONTINUED | OUTPATIENT
Start: 2020-10-21 | End: 2020-10-21

## 2020-10-21 RX ORDER — IBUPROFEN 600 MG/1
600 TABLET ORAL EVERY 6 HOURS PRN
Status: DISCONTINUED | OUTPATIENT
Start: 2020-10-21 | End: 2020-10-21 | Stop reason: HOSPADM

## 2020-10-21 RX ORDER — CARBOPROST TROMETHAMINE 250 UG/ML
250 INJECTION, SOLUTION INTRAMUSCULAR AS NEEDED
Status: DISCONTINUED | OUTPATIENT
Start: 2020-10-21 | End: 2020-10-21 | Stop reason: HOSPADM

## 2020-10-21 RX ADMIN — WITCH HAZEL 1 PAD: 500 SOLUTION RECTAL; TOPICAL at 13:35

## 2020-10-21 RX ADMIN — HYDROCORTISONE 2.5% 1 APPLICATION: 25 CREAM TOPICAL at 13:36

## 2020-10-21 RX ADMIN — IBUPROFEN 600 MG: 600 TABLET, FILM COATED ORAL at 13:35

## 2020-10-21 RX ADMIN — FENTANYL CITRATE 100 MCG: 50 INJECTION, SOLUTION INTRAMUSCULAR; INTRAVENOUS at 07:43

## 2020-10-21 RX ADMIN — PRENATAL VITAMINS-IRON FUMARATE 27 MG IRON-FOLIC ACID 0.8 MG TABLET 1 TABLET: at 18:38

## 2020-10-21 RX ADMIN — CEFAZOLIN SODIUM 2 G: 2 INJECTION, SOLUTION INTRAVENOUS at 05:59

## 2020-10-21 RX ADMIN — LIDOCAINE HYDROCHLORIDE AND EPINEPHRINE 3 ML: 15; 5 INJECTION, SOLUTION EPIDURAL at 07:40

## 2020-10-21 RX ADMIN — IBUPROFEN 600 MG: 600 TABLET, FILM COATED ORAL at 23:57

## 2020-10-21 RX ADMIN — ONDANSETRON HYDROCHLORIDE 4 MG: 2 SOLUTION INTRAMUSCULAR; INTRAVENOUS at 13:03

## 2020-10-21 RX ADMIN — Medication 2 APPLICATION: at 13:35

## 2020-10-21 RX ADMIN — SODIUM CHLORIDE, POTASSIUM CHLORIDE, SODIUM LACTATE AND CALCIUM CHLORIDE 2000 ML/HR: 600; 310; 30; 20 INJECTION, SOLUTION INTRAVENOUS at 07:24

## 2020-10-21 RX ADMIN — ROPIVACAINE HYDROCHLORIDE 10 ML: 5 INJECTION, SOLUTION EPIDURAL; INFILTRATION; PERINEURAL at 07:46

## 2020-10-21 RX ADMIN — ROPIVACAINE HYDROCHLORIDE 15 ML/HR: 2 INJECTION, SOLUTION EPIDURAL; INFILTRATION at 07:48

## 2020-10-21 RX ADMIN — SODIUM CHLORIDE, POTASSIUM CHLORIDE, SODIUM LACTATE AND CALCIUM CHLORIDE 125 ML/HR: 600; 310; 30; 20 INJECTION, SOLUTION INTRAVENOUS at 05:40

## 2020-10-21 RX ADMIN — ONDANSETRON HYDROCHLORIDE 4 MG: 4 TABLET, FILM COATED ORAL at 23:57

## 2020-10-21 RX ADMIN — Medication: at 13:35

## 2020-10-21 RX ADMIN — FLUOXETINE HYDROCHLORIDE 40 MG: 20 CAPSULE ORAL at 18:39

## 2020-10-21 RX ADMIN — SODIUM CHLORIDE, POTASSIUM CHLORIDE, SODIUM LACTATE AND CALCIUM CHLORIDE 125 ML/HR: 600; 310; 30; 20 INJECTION, SOLUTION INTRAVENOUS at 08:20

## 2020-10-21 NOTE — LACTATION NOTE
10/21/20 1440   Maternal Information   Date of Referral 10/21/20   Maternal Infant Feeding   Maternal Emotional State independent;receptive;relaxed   Milk Expression/Equipment   Breast Pump Type double electric, personal     Mom states baby has nursed well x 2. Enc to feed on demand, about every 2-3 hrs. Enc to call for asst as needed. Teaching done.

## 2020-10-21 NOTE — H&P
[facility]  Obstetric History and Physical    Chief Complaint   Patient presents with   • Scheduled Induction   • Rupture of Membranes   • Contractions       Subjective     Patient is a 30 y.o. female  currently at 39w4d, who presents for term labor  with ROM. SROM at 0330 this AM, onset of labor shortly thereafter.  GFM.     Her prenatal care is benign.  Her previous obstetric/gynecological history is noted for PTD of twins.    The following portions of the patients history were reviewed and updated as appropriate: current medications, allergies, past medical history, past surgical history, past family history, past social history and problem list .       Prenatal Information:   Maternal Prenatal Labs  Blood Type ABO Type   Date Value Ref Range Status   10/21/2020 B  Final      Rh Status RH type   Date Value Ref Range Status   10/21/2020 Positive  Final      Antibody Screen Antibody Screen   Date Value Ref Range Status   10/21/2020 Negative  Final                        Past OB History:       OB History    Para Term  AB Living   3 1 0 1 1 2   SAB TAB Ectopic Molar Multiple Live Births   1 0 0 0 1 2      # Outcome Date GA Lbr Denzel/2nd Weight Sex Delivery Anes PTL Lv   3 Current            2A  17 32w1d  1640 g (3 lb 9.9 oz) M Vag-Spont Spinal Y CLARISSA      Name: LORIJOSE FRANCISCO      Apgar1: 5  Apgar5: 8   2B  17 32w1d  1790 g (3 lb 15.1 oz) M Vag-Spont Spinal Y CLARISSA      Name: SARAHJOSE FRANCISCO QUEEN      Apgar1: 5  Apgar5: 8   1 SAB 17 12w0d   U          Birth Comments: + D and C       Past Medical History: Past Medical History:   Diagnosis Date   • Anxiety    • Depression       Past Surgical History Past Surgical History:   Procedure Laterality Date   • APPENDECTOMY     • DILATATION AND CURETTAGE     • ULNAR NERVE TRANSPOSITION     • WISDOM TOOTH EXTRACTION        Family History: History reviewed. No pertinent family history.   Social History:  reports that  she has never smoked. She has never used smokeless tobacco.   reports no history of alcohol use.   reports no history of drug use.        REVIEW OF SYSTEMS              Reports fetal movement is normal             Reportsleakage of amniotic fluid.             Denies vaginal bleeding             She reports No contractions, Regular contractions, frequency: Every 3 minutes, intensity strong             All other systems reviewed and are negative    Objective       Vital Signs Range for the last 24 hours  Temperature: Temp:  [98.7 °F (37.1 °C)] 98.7 °F (37.1 °C)   Temp Source: Temp src: Oral   BP: BP: (108-142)/(66-76) 120/72   Pulse: Heart Rate:  [] 95   Respirations: Resp:  [16] 16   SPO2:     O2 Amount (l/min):     O2 Devices     Weight: Weight:  [59.9 kg (132 lb)] 59.9 kg (132 lb)       Presentation: vtx   Cervix: Exam by: Method: sterile exam per RN   Dilation: Cervical Dilation (cm): 10   Effacement: Cervical Effacement: 100%   Station: Fetal Station: 0        Fetal Heart Rate Assessment   Method: Fetal HR Assessment Method: external   Beats/min: Fetal HR (beats/min): 120   Baseline: Fetal Heart Baseline Rate: normal range   Varibility: Fetal HR Variability: moderate (amplitude range 6 to 25 bpm)   Accels: Fetal HR Accelerations: greater than/equal to 15 bpm, lasting at least 15 seconds   Decels: Fetal HR Decelerations: early   Tracing Category:  1     Uterine Assessment   Method: Method: external tocotransducer   Frequency (min): Contraction Frequency (Minutes): 1-2(monitor adjusted)   Ctx Count in 10 min:     Duration:     Intensity: Contraction Intensity: no contractions   Intensity by IUPC:     Resting Tone: Uterine Resting Tone: soft by palpation   Resting Tone by IUPC:     [place] Units:       Constitutional:  Well developed, well nourished, no acute distress, well-groomed.   Respiratory:  Lungs are clear to auscultation bilaterally, normal breath sounds.   Cardiovascular:  Normal rate and rhythm,  no murmurs.   Gastrointestinal:  Soft, gravid, nontender.  Uterus: Soft, nontender. Fundus appropriate for dates.  Neurologic:  Alert & oriented x 3,  no focal deficits noted.   Psychiatric:  Speech and behavior appropriate.   Extremities: no cyanosis, clubbing or edema, no evidence of DVT.        Labs:  Lab Results   Component Value Date    WBC 10.58 10/21/2020    HGB 11.8 (L) 10/21/2020    HCT 36.1 10/21/2020    MCV 87.0 10/21/2020     10/21/2020    URICACID 8.7 (H) 2017    AST 17 09/15/2020    ALT 9 09/15/2020     (H) 2017     Results from last 7 days   Lab Units 10/21/20  0547   ABO TYPING  B   RH TYPING  Positive   ANTIBODY SCREEN  Negative           Assessment/Plan       Term pregnancy        Assessment:  1.  Intrauterine pregnancy at 39w4d weeks gestation with reactive fetal status.    2.   term labor  with ROM  3.  Obstetrical history significant for prior  x 2 of 32w twins.  4.  GBS pos     Plan:  1.Expectant management.  Ancef for GBS ppx.  2. Plan of care has been reviewed with patient and questions answered.  3.  Risks, benefits of treatment plan have been discussed.  4.  All questions have been answered.        Harmony Chavez MD  10/21/2020  09:20 EDT 3

## 2020-10-21 NOTE — L&D DELIVERY NOTE
[facility]  Vaginal Delivery Note    Delivery     Delivery: Vaginal, Spontaneous     YOB: 2022    Time of Birth:  Gestational Age 9:11 AM   39w4d     Anesthesia: Epidural     Delivering clinician: Harmony Chavez    Forceps?   No   Vacuum? No    Shoulder dystocia present: No        Delivery narrative:  Uncomplicated  over a 1st degree laceration.  Anterior shoulder delivered with ease.  Infant vigorous at delivery so placed on maternal abdomen.  Delayed cord clamping x 1 min.  Cord doubly clamped and cut.  Laceration repaired in standard fashion using 3-0 vicryl rapide.  Placenta delivered intact with gentle cord traction.      Infant    Findings: female  infant     Infant observations: Weight: 3435 g (7 lb 9.2 oz)   Length: 19  in  Observations/Comments:        Apgars: 9  @ 1 minute /    9  @ 5 minutes   Infant Name: Brylee     Placenta, Cord, and Fluid    Placenta delivered  Spontaneous  at   10/21/2020  9:16 AM     Cord: 3 vessels  present.   Nuchal Cord?  no   Cord blood obtained: Yes    Cord gases obtained:  No    Cord gas results: Venous:  No results found for: PHCVEN    Arterial:  No results found for: PHCART     Repair    Episiotomy: None     No    Lacerations: Yes  Laceration Information  Laceration Repaired?   Perineal: 1st  Yes    Periurethral:       Labial:       Sulcus:       Vaginal:       Cervical:         Suture used for repair: 3-0 Vicryl rapide   Estimated Blood Loss: Est. Blood Loss (mL): 100 mL(Filed from Delivery Summary) (10/21/20 0911)           Complications  none    Disposition  Mother to Mother Baby/Postpartum  in stable condition currently.  Baby to remains with mom  in stable condition currently.      Harmony Chavez MD  10/21/20  14:10 EDT

## 2020-10-21 NOTE — ANESTHESIA PREPROCEDURE EVALUATION
Anesthesia Evaluation     Patient summary reviewed and Nursing notes reviewed   NPO Solid Status: > 6 hours  NPO Liquid Status: > 6 hours           Airway   Mallampati: II  TM distance: >3 FB  Neck ROM: full  No difficulty expected  Dental      Pulmonary - negative pulmonary ROS   Cardiovascular - negative cardio ROS        Neuro/Psych  (+) psychiatric history Anxiety and Depression,     GI/Hepatic/Renal/Endo - negative ROS     Musculoskeletal (-) negative ROS    Abdominal    Substance History - negative use     OB/GYN    (+) Pregnant,         Other                        Anesthesia Plan    ASA 2     epidural       Anesthetic plan, all risks, benefits, and alternatives have been provided, discussed and informed consent has been obtained with: patient.

## 2020-10-21 NOTE — ANESTHESIA PROCEDURE NOTES
Labor Epidural      Patient reassessed immediately prior to procedure    Patient location during procedure: OB  Performed By  CRNA: Kristine Solis CRNA  Preanesthetic Checklist  Completed: patient identified, surgical consent, pre-op evaluation, timeout performed, IV checked, risks and benefits discussed and monitors and equipment checked  Additional Notes  Scoliosis-2nd attempt performed to maximize effectiveness of block- tuohy repositioned to right after transient left paresthesia; no paresthesia after repositioning.  Prep:  Pt Position:sitting  Sterile Tech:cap, gloves, mask and sterile barrier  Prep:DuraPrep  Monitoring:blood pressure monitoring  Epidural Block Procedure:  Approach:midline  Guidance:palpation technique  Location:L3-L4  Needle Type:Tuohy  Needle Gauge:17 G  Loss of Resistance Medium: saline  Loss of Resistance: 6cm  Cath Depth at skin:11 cm  Paresthesia: left and transient  Aspiration:negative  Test Dose:negative  Number of Attempts: 2  Post Assessment:  Dressing:occlusive dressing applied and secured with tape  Pt Tolerance:patient tolerated the procedure well with no apparent complications  Complications:no

## 2020-10-21 NOTE — PLAN OF CARE
Problem: Breastfeeding  Goal: Effective Breastfeeding  Outcome: Ongoing, Progressing  Intervention: Promote Breast Care and Comfort  Flowsheets (Taken 10/21/2020 1440)  Breast Care: Breastfeeding: frequency of feeding adjusted  Intervention: Promote Effective Breastfeeding  Flowsheets (Taken 10/21/2020 1440)  Breastfeeding Assistance:   feeding cue recognition promoted   feeding on demand promoted  Parent/Child Attachment Promotion:   positive reinforcement provided   rooming-in promoted   skin-to-skin contact encouraged   participation in care promoted  Intervention: Support Exclusive Breastfeeding Success  Flowsheets (Taken 10/21/2020 1440)  Supportive Measures: positive reinforcement provided  Breastfeeding Support: lactation counseling provided   Goal Outcome Evaluation:

## 2020-10-22 LAB
HCT VFR BLD AUTO: 33 % (ref 34–46.6)
HGB BLD-MCNC: 10.6 G/DL (ref 12–15.9)

## 2020-10-22 PROCEDURE — 85014 HEMATOCRIT: CPT | Performed by: OBSTETRICS & GYNECOLOGY

## 2020-10-22 PROCEDURE — 85018 HEMOGLOBIN: CPT | Performed by: OBSTETRICS & GYNECOLOGY

## 2020-10-22 RX ADMIN — IBUPROFEN 600 MG: 600 TABLET, FILM COATED ORAL at 23:05

## 2020-10-22 RX ADMIN — IBUPROFEN 600 MG: 600 TABLET, FILM COATED ORAL at 10:52

## 2020-10-22 RX ADMIN — DOCUSATE SODIUM 100 MG: 100 CAPSULE ORAL at 23:05

## 2020-10-22 RX ADMIN — IBUPROFEN 600 MG: 600 TABLET, FILM COATED ORAL at 17:24

## 2020-10-22 RX ADMIN — DOCUSATE SODIUM 100 MG: 100 CAPSULE ORAL at 10:52

## 2020-10-22 RX ADMIN — FLUOXETINE HYDROCHLORIDE 40 MG: 20 CAPSULE ORAL at 10:52

## 2020-10-22 NOTE — ANESTHESIA POSTPROCEDURE EVALUATION
Patient: Amie Sepulveda    Procedure Summary     Date: 10/21/20 Room / Location:     Anesthesia Start: 0728 Anesthesia Stop: 0916    Procedure: LABOR ANALGESIA Diagnosis:     Scheduled Providers:  Provider: Jasper Royal DO    Anesthesia Type: epidural ASA Status: 2          Anesthesia Type: epidural    Vitals  Vitals Value Taken Time   /57 10/22/20 0000   Temp 98.2 °F (36.8 °C) 10/22/20 0000   Pulse 74 10/22/20 0000   Resp 16 10/22/20 0000   SpO2             Post Anesthesia Care and Evaluation    Patient location during evaluation: bedside  Patient participation: complete - patient participated  Level of consciousness: awake and alert  Pain management: adequate  Airway patency: patent  Anesthetic complications: No anesthetic complications    Cardiovascular status: acceptable  Respiratory status: acceptable  Hydration status: acceptable  Post Neuraxial Block status: Motor and sensory function returned to baseline and No signs or symptoms of PDPH

## 2020-10-22 NOTE — PROGRESS NOTES
10/22/2020  PPD #1    Subjective   Amie feels well.  Patient describes her lochia as less than menses.  Pain is well controlled       Objective   Temp: Temp:  [97.5 °F (36.4 °C)-98.5 °F (36.9 °C)] 98.2 °F (36.8 °C) Temp src: Oral   BP: BP: ()/(55-78) 108/57        Pulse: Heart Rate:  [] 74  RR: Resp:  [16] 16    General:  No acute distress   Abdomen: Fundus firm and beneath umbilicus   Pelvis: deferred     Lab Results   Component Value Date    WBC 10.58 10/21/2020    HGB 10.6 (L) 10/22/2020    HCT 33.0 (L) 10/22/2020    MCV 87.0 10/21/2020     10/21/2020    HEPBSAG Non-Reactive 03/05/2020    AST 17 09/15/2020    URICACID 8.7 (H) 12/29/2017       Assessment  1. PPD# 1 after vaginal delivery    Plan  1. Supportive care, anticipate d/c in am.      This note has been electronically signed.    Harmony Chavez MD  07:54 EDT  October 22, 2020

## 2020-10-23 VITALS
HEIGHT: 62 IN | BODY MASS INDEX: 24.29 KG/M2 | RESPIRATION RATE: 16 BRPM | SYSTOLIC BLOOD PRESSURE: 107 MMHG | TEMPERATURE: 97.5 F | DIASTOLIC BLOOD PRESSURE: 64 MMHG | WEIGHT: 132 LBS | HEART RATE: 76 BPM

## 2020-10-23 RX ORDER — IBUPROFEN 600 MG/1
600 TABLET ORAL EVERY 6 HOURS PRN
Qty: 30 TABLET | Refills: 0 | Status: SHIPPED | OUTPATIENT
Start: 2020-10-23 | End: 2021-09-20

## 2020-10-23 RX ORDER — PSEUDOEPHEDRINE HCL 30 MG
100 TABLET ORAL 2 TIMES DAILY PRN
Qty: 30 CAPSULE | Refills: 0 | Status: SHIPPED | OUTPATIENT
Start: 2020-10-23 | End: 2021-09-20

## 2020-10-23 RX ADMIN — IBUPROFEN 600 MG: 600 TABLET, FILM COATED ORAL at 08:32

## 2020-10-23 RX ADMIN — FLUOXETINE HYDROCHLORIDE 40 MG: 20 CAPSULE ORAL at 08:33

## 2020-10-23 RX ADMIN — PRENATAL VITAMINS-IRON FUMARATE 27 MG IRON-FOLIC ACID 0.8 MG TABLET 1 TABLET: at 08:32

## 2020-10-23 RX ADMIN — DOCUSATE SODIUM 100 MG: 100 CAPSULE ORAL at 08:33

## 2020-10-23 NOTE — DISCHARGE SUMMARY
Psychiatric  Vaginal Delivery Discharge Summary      Patient: Amie Sepulveda      MR#:[id number]  Admission  Diagnosis: term pregnancy  Discharge Diagnosis: Same    Date of Admission: 10/21/2020  Date of Discharge:  10/23/2020    Procedures:  Vaginal, Spontaneous     10/21/2020    9:11 AM      Service:  Obstetrics    Hospital Course:  Patient underwent vaginal delivery and remained in the hospital for 2 days.  During that time she remained afebrile and hemodynamically stable.  On the day of discharge, she was eating, ambulating and voiding without difficulty.      Lab Results   Component Value Date    WBC 10.58 10/21/2020    HGB 10.6 (L) 10/22/2020    HCT 33.0 (L) 10/22/2020    MCV 87.0 10/21/2020     10/21/2020    URICACID 8.7 (H) 12/29/2017    AST 17 09/15/2020    ALT 9 09/15/2020     (H) 12/29/2017     Results from last 7 days   Lab Units 10/21/20  0547   ABO TYPING  B   RH TYPING  Positive   ANTIBODY SCREEN  Negative       Discharge Medications     Discharge Medications      New Medications      Instructions Start Date   docusate sodium 100 MG capsule   100 mg, Oral, 2 Times Daily PRN         Continue These Medications      Instructions Start Date   ibuprofen 600 MG tablet  Commonly known as: ADVIL,MOTRIN   600 mg, Oral, Every 6 Hours PRN      Prenatal 27-1 27-1 MG tablet tablet   Oral, Daily      PROzac 40 MG capsule  Generic drug: FLUoxetine   40 mg, Oral, Daily         Stop These Medications    acetaminophen 325 MG tablet  Commonly known as: TYLENOL     ferrous sulfate 325 (65 FE) MG tablet            Discharge Disposition:  To Home    Discharge Condition:  Stable    Discharge Diet: regular    Activity at Discharge: Pelvic rest    Follow-up Appointments  6 weeks      Harmony Chavez MD  10/23/20  08:47 EDT

## 2020-10-23 NOTE — LACTATION NOTE
"Infant at -9.87% wt loss. Mother instructed in need to pump after nursing supplementing EBM and or formula up to 1/2 oz. Given and reviewed '\"Breastfeeding is going well when\". To call clinic if need or concern. VU   "

## 2020-10-23 NOTE — PLAN OF CARE
Goal Outcome Evaluation:   Vitals within normal, tolerating PO, voiding w/out difficulty, light lochia no s/s of infection

## 2020-10-23 NOTE — PROGRESS NOTES
10/23/2020  PPD #2    Subjective   Amie feels well.  Patient describes her lochia as less than menses.  Pain is well controlled       Objective   Temp: Temp:  [98.2 °F (36.8 °C)-98.4 °F (36.9 °C)] 98.2 °F (36.8 °C) Temp src: Oral   BP: BP: ()/(56-64) 106/64        Pulse: Heart Rate:  [71] 71  RR: Resp:  [16] 16    General:  No acute distress   Abdomen: Fundus firm and beneath umbilicus   Pelvis: deferred     Lab Results   Component Value Date    WBC 10.58 10/21/2020    HGB 10.6 (L) 10/22/2020    HCT 33.0 (L) 10/22/2020    MCV 87.0 10/21/2020     10/21/2020    HEPBSAG Non-Reactive 03/05/2020    AST 17 09/15/2020    URICACID 8.7 (H) 12/29/2017       Assessment  1. PPD# 2 after vaginal delivery    Plan  1. Discharge to home  2. Follow up with LW  in 6 weeks  3. Advised no tampons, intercourse, or tub baths for 6 weeks.       This note has been electronically signed.    Harmony Chavez MD  08:46 EDT  October 23, 2020

## 2021-02-04 ENCOUNTER — TRANSCRIBE ORDERS (OUTPATIENT)
Dept: LAB | Facility: HOSPITAL | Age: 31
End: 2021-02-04

## 2021-02-04 ENCOUNTER — LAB (OUTPATIENT)
Dept: LAB | Facility: HOSPITAL | Age: 31
End: 2021-02-04

## 2021-02-04 DIAGNOSIS — R30.0 DYSURIA: ICD-10-CM

## 2021-02-04 DIAGNOSIS — R30.0 DYSURIA: Primary | ICD-10-CM

## 2021-02-04 LAB
BACTERIA UR QL AUTO: ABNORMAL /HPF
BILIRUB UR QL STRIP: NEGATIVE
CLARITY UR: CLEAR
COLOR UR: YELLOW
GLUCOSE UR STRIP-MCNC: NEGATIVE MG/DL
HGB UR QL STRIP.AUTO: ABNORMAL
HYALINE CASTS UR QL AUTO: ABNORMAL /LPF
KETONES UR QL STRIP: NEGATIVE
LEUKOCYTE ESTERASE UR QL STRIP.AUTO: ABNORMAL
NITRITE UR QL STRIP: NEGATIVE
PH UR STRIP.AUTO: 6.5 [PH] (ref 5–8)
PROT UR QL STRIP: ABNORMAL
RBC # UR: ABNORMAL /HPF
REF LAB TEST METHOD: ABNORMAL
SP GR UR STRIP: 1.03 (ref 1–1.03)
SQUAMOUS #/AREA URNS HPF: ABNORMAL /HPF
UROBILINOGEN UR QL STRIP: ABNORMAL
WBC UR QL AUTO: ABNORMAL /HPF

## 2021-02-04 PROCEDURE — 81001 URINALYSIS AUTO W/SCOPE: CPT

## 2021-02-04 PROCEDURE — 87086 URINE CULTURE/COLONY COUNT: CPT

## 2021-02-05 LAB — BACTERIA SPEC AEROBE CULT: ABNORMAL

## 2021-08-31 ENCOUNTER — TRANSCRIBE ORDERS (OUTPATIENT)
Dept: LAB | Facility: HOSPITAL | Age: 31
End: 2021-08-31

## 2021-08-31 ENCOUNTER — LAB (OUTPATIENT)
Dept: LAB | Facility: HOSPITAL | Age: 31
End: 2021-08-31

## 2021-08-31 DIAGNOSIS — Z20.822 COVID-19 RULED OUT: ICD-10-CM

## 2021-08-31 DIAGNOSIS — Z20.822 COVID-19 RULED OUT: Primary | ICD-10-CM

## 2021-08-31 LAB — SARS-COV-2 RNA NOSE QL NAA+PROBE: NOT DETECTED

## 2021-08-31 PROCEDURE — U0004 COV-19 TEST NON-CDC HGH THRU: HCPCS

## 2021-09-01 ENCOUNTER — TELEPHONE (OUTPATIENT)
Dept: PREADMISSION TESTING | Facility: HOSPITAL | Age: 31
End: 2021-09-01

## 2021-09-08 ENCOUNTER — TRANSCRIBE ORDERS (OUTPATIENT)
Dept: LAB | Facility: HOSPITAL | Age: 31
End: 2021-09-08

## 2021-09-08 ENCOUNTER — LAB (OUTPATIENT)
Dept: LAB | Facility: HOSPITAL | Age: 31
End: 2021-09-08

## 2021-09-08 DIAGNOSIS — Z20.822 COVID-19 RULED OUT: Primary | ICD-10-CM

## 2021-09-08 DIAGNOSIS — Z20.822 COVID-19 RULED OUT: ICD-10-CM

## 2021-09-08 PROCEDURE — U0004 COV-19 TEST NON-CDC HGH THRU: HCPCS

## 2021-09-09 LAB — SARS-COV-2 RNA NOSE QL NAA+PROBE: DETECTED

## 2021-09-10 ENCOUNTER — TELEPHONE (OUTPATIENT)
Dept: OTHER | Facility: HOSPITAL | Age: 31
End: 2021-09-10

## 2021-09-20 ENCOUNTER — OFFICE VISIT (OUTPATIENT)
Dept: INTERNAL MEDICINE | Facility: CLINIC | Age: 31
End: 2021-09-20

## 2021-09-20 VITALS
BODY MASS INDEX: 18.92 KG/M2 | HEART RATE: 78 BPM | SYSTOLIC BLOOD PRESSURE: 100 MMHG | HEIGHT: 62 IN | TEMPERATURE: 98 F | DIASTOLIC BLOOD PRESSURE: 68 MMHG | RESPIRATION RATE: 16 BRPM | OXYGEN SATURATION: 98 % | WEIGHT: 102.8 LBS

## 2021-09-20 DIAGNOSIS — R63.4 WEIGHT LOSS: ICD-10-CM

## 2021-09-20 DIAGNOSIS — R53.83 FATIGUE, UNSPECIFIED TYPE: ICD-10-CM

## 2021-09-20 DIAGNOSIS — F90.9 ATTENTION DEFICIT HYPERACTIVITY DISORDER (ADHD), UNSPECIFIED ADHD TYPE: ICD-10-CM

## 2021-09-20 DIAGNOSIS — F32.A ANXIETY AND DEPRESSION: Primary | ICD-10-CM

## 2021-09-20 DIAGNOSIS — F41.9 ANXIETY AND DEPRESSION: Primary | ICD-10-CM

## 2021-09-20 PROCEDURE — 99204 OFFICE O/P NEW MOD 45 MIN: CPT | Performed by: FAMILY MEDICINE

## 2021-09-20 RX ORDER — ESCITALOPRAM OXALATE 10 MG/1
10 TABLET ORAL DAILY
COMMUNITY
End: 2021-10-18

## 2021-09-21 LAB
25(OH)D3+25(OH)D2 SERPL-MCNC: 35.1 NG/ML (ref 30–100)
ALBUMIN SERPL-MCNC: 4.3 G/DL (ref 3.8–4.8)
ALBUMIN/GLOB SERPL: 1.7 {RATIO} (ref 1.2–2.2)
ALP SERPL-CCNC: 100 IU/L (ref 44–121)
ALT SERPL-CCNC: 22 IU/L (ref 0–32)
AST SERPL-CCNC: 15 IU/L (ref 0–40)
BASOPHILS # BLD AUTO: 0.1 X10E3/UL (ref 0–0.2)
BASOPHILS NFR BLD AUTO: 1 %
BILIRUB SERPL-MCNC: 1.7 MG/DL (ref 0–1.2)
BUN SERPL-MCNC: 9 MG/DL (ref 6–20)
BUN/CREAT SERPL: 14 (ref 9–23)
CALCIUM SERPL-MCNC: 9.2 MG/DL (ref 8.7–10.2)
CHLORIDE SERPL-SCNC: 102 MMOL/L (ref 96–106)
CO2 SERPL-SCNC: 24 MMOL/L (ref 20–29)
CREAT SERPL-MCNC: 0.64 MG/DL (ref 0.57–1)
EOSINOPHIL # BLD AUTO: 0.1 X10E3/UL (ref 0–0.4)
EOSINOPHIL NFR BLD AUTO: 1 %
ERYTHROCYTE [DISTWIDTH] IN BLOOD BY AUTOMATED COUNT: 12.7 % (ref 11.7–15.4)
FOLATE SERPL-MCNC: 9.6 NG/ML
GLOBULIN SER CALC-MCNC: 2.6 G/DL (ref 1.5–4.5)
GLUCOSE SERPL-MCNC: 117 MG/DL (ref 65–99)
HCT VFR BLD AUTO: 38.7 % (ref 34–46.6)
HGB BLD-MCNC: 12.7 G/DL (ref 11.1–15.9)
IMM GRANULOCYTES # BLD AUTO: 0 X10E3/UL (ref 0–0.1)
IMM GRANULOCYTES NFR BLD AUTO: 0 %
LYMPHOCYTES # BLD AUTO: 1.5 X10E3/UL (ref 0.7–3.1)
LYMPHOCYTES NFR BLD AUTO: 21 %
MCH RBC QN AUTO: 28.5 PG (ref 26.6–33)
MCHC RBC AUTO-ENTMCNC: 32.8 G/DL (ref 31.5–35.7)
MCV RBC AUTO: 87 FL (ref 79–97)
MONOCYTES # BLD AUTO: 0.6 X10E3/UL (ref 0.1–0.9)
MONOCYTES NFR BLD AUTO: 9 %
NEUTROPHILS # BLD AUTO: 4.7 X10E3/UL (ref 1.4–7)
NEUTROPHILS NFR BLD AUTO: 68 %
PLATELET # BLD AUTO: 302 X10E3/UL (ref 150–450)
POTASSIUM SERPL-SCNC: 3.8 MMOL/L (ref 3.5–5.2)
PROT SERPL-MCNC: 6.9 G/DL (ref 6–8.5)
RBC # BLD AUTO: 4.45 X10E6/UL (ref 3.77–5.28)
SODIUM SERPL-SCNC: 139 MMOL/L (ref 134–144)
T4 FREE SERPL-MCNC: 1.28 NG/DL (ref 0.82–1.77)
TSH SERPL DL<=0.005 MIU/L-ACNC: 0.7 UIU/ML (ref 0.45–4.5)
VIT B12 SERPL-MCNC: 1096 PG/ML (ref 232–1245)
WBC # BLD AUTO: 6.9 X10E3/UL (ref 3.4–10.8)

## 2021-09-24 DIAGNOSIS — R73.9 HYPERGLYCEMIA: Primary | ICD-10-CM

## 2021-09-24 DIAGNOSIS — R17 ELEVATED BILIRUBIN: ICD-10-CM

## 2021-09-26 PROBLEM — F32.A ANXIETY AND DEPRESSION: Status: ACTIVE | Noted: 2021-09-26

## 2021-09-26 PROBLEM — F90.9 ATTENTION DEFICIT HYPERACTIVITY DISORDER (ADHD): Status: ACTIVE | Noted: 2021-09-26

## 2021-09-26 PROBLEM — F41.9 ANXIETY AND DEPRESSION: Status: ACTIVE | Noted: 2021-09-26

## 2021-09-27 NOTE — ASSESSMENT & PLAN NOTE
Uncontrolled on current medication.  At this time patient will continue Lexapro.  Will obtain Redeemia testing to determine most appropriate medication for the patient.  Discussed possibly trying Paxil to help with weight gain and mental health.

## 2021-10-04 ENCOUNTER — TELEPHONE (OUTPATIENT)
Dept: INTERNAL MEDICINE | Facility: CLINIC | Age: 31
End: 2021-10-04

## 2021-10-04 NOTE — TELEPHONE ENCOUNTER
Caller: Amie Sepulveda    Relationship: Self    Best call back number: [phone]     Caller requesting test results: THE PATIENT AMIE    What test was performed: LABS FOR GENETIC TESTING    When was the test performed: 09/20/2021    Where was the test performed: AT THE PRACTICE  Additional notes: PLEASE CALL AND ADVISE [phone].

## 2021-10-18 RX ORDER — DESVENLAFAXINE SUCCINATE 50 MG/1
50 TABLET, EXTENDED RELEASE ORAL DAILY
Qty: 30 TABLET | Refills: 5 | Status: SHIPPED | OUTPATIENT
Start: 2021-10-18 | End: 2022-01-13 | Stop reason: SDUPTHER

## 2021-10-18 NOTE — TELEPHONE ENCOUNTER
It looks like effexor or pristiq would be good options for her.  Would she like to start one of those?  She has an appt with Garfield Alas in 10 days.

## 2021-10-18 NOTE — TELEPHONE ENCOUNTER
Patient would like to try either of these, whichever you recommend most. She uses [facility] on [address]

## 2021-10-28 ENCOUNTER — PATIENT ROUNDING (BHMG ONLY) (OUTPATIENT)
Dept: PSYCHIATRY | Facility: CLINIC | Age: 31
End: 2021-10-28

## 2021-10-28 ENCOUNTER — OFFICE VISIT (OUTPATIENT)
Dept: PSYCHIATRY | Facility: CLINIC | Age: 31
End: 2021-10-28

## 2021-10-28 VITALS
HEART RATE: 76 BPM | WEIGHT: 103 LBS | BODY MASS INDEX: 18.95 KG/M2 | DIASTOLIC BLOOD PRESSURE: 68 MMHG | SYSTOLIC BLOOD PRESSURE: 108 MMHG | HEIGHT: 62 IN

## 2021-10-28 DIAGNOSIS — F90.0 ADHD (ATTENTION DEFICIT HYPERACTIVITY DISORDER), INATTENTIVE TYPE: Primary | ICD-10-CM

## 2021-10-28 DIAGNOSIS — F41.1 GENERALIZED ANXIETY DISORDER: ICD-10-CM

## 2021-10-28 DIAGNOSIS — F33.2 SEVERE EPISODE OF RECURRENT MAJOR DEPRESSIVE DISORDER, WITHOUT PSYCHOTIC FEATURES (HCC): ICD-10-CM

## 2021-10-28 DIAGNOSIS — Z79.899 MEDICATION MANAGEMENT: ICD-10-CM

## 2021-10-28 PROCEDURE — 90792 PSYCH DIAG EVAL W/MED SRVCS: CPT | Performed by: NURSE PRACTITIONER

## 2021-10-28 NOTE — PROGRESS NOTES
October 28, 2021    Hello, may I speak with Amie Sepulveda?    My name is JULIETTE AUGUST    I am  with [facility]  [address] [place] [id number] [phone].    Before we get started may I verify your date of birth? 1990    I am calling to officially welcome you to our practice and ask about your recent visit. Is this a good time to talk? YES, I REGISTERED THE PATIENT AND ALSO CHECKED HER OUT AFTER HER APT WITH LUIS ALFREDO.     Tell me about your visit with us. What things went well?  EVERYTHING WENT WELL, ALL MY QUESTIONS WERE ADDRESSED. VERY FRIENDLY       We're always looking for ways to make our patients' experiences even better. Do you have recommendations on ways we may improve?  no not at this time    Overall were you satisfied with your first visit to our practice? YES       I appreciate you taking the time to speak with me today. Is there anything else I can do for you? YES, I WOULD LIKE TO SCHEDULE A THERAPY APT, WE HAD AN OPENING NEXT WEEK AND PT WAS VERY HAPPY ABOUT THAT. ADVISED IF SHE HAD QUESTIONS LATER, TO PLEASE CALL OUR OFFICE      Thank you, and have a great day.

## 2021-10-28 NOTE — PROGRESS NOTES
"Chief Complaint  ADHD, Anxiety, and Depression      Subjective          Amie Sepulveda presents to [facility] for evaluation for medication management of her ADHD, anxiety, depression.    History of Present Illness: Patient presents today as a referral from her primary care physician for medication management.  Patient is currently taking Pristiq 50 mg daily.  She reports her PCP started this medication in approximately 1 week ago.  Patient reports prior medications include Prozac, Lexapro, Zoloft, and Adderall (which she stopped approximately 2 years ago).  Patient reports she is struggled with anxiety, depression, and ADHD symptoms \"for years.  I have had the anxiety issues at least since I was a child\".  Patient reports she believes her depression issues primarily started around college, \"but they worsened in 2017 after a miscarriage\".  Patient reports she feels as though she never fully recovered from her miscarriage.  Patient was diagnosed with ADHD in 2013.  She endorses having severe issues issues with focus and concentration as a child.  She endorses having difficulties with school.  \"I procrastinate everything.  I always put it off until the last minute, and then have to do everything all at once\".  Patient reports in 2013 she was working on her masters degree when she was diagnosed, but was not able to finish at that time.  Patient reports she is back in school again now for the same masters degree, and because she is not medicated she is struggling heavily again.  She reports the Adderall she took helped, but said she required a very low dose because she would feel very jittery from it.  Patient endorses following symptoms of ADHD; poor focus, procrastination, rarely finishing tasks she starts, very forgetful, losing things frequently, very easily distracted, and very poor organization.  Patient reports she feels as though her depression symptoms are always there, \"I " "feel like it is constant.  Maybe I get a break every now and then, but it is just always there\".  She reports symptoms of very low energy, very low motivation, excessive sleep (\"all I think about is sleep\"), fatigued (\"I am just exhausted all the time\"), decreased appetite (patient has been losing weight since 2018).  She endorses symptoms of anxiety that consist of feeling extremely irritable, constant worry (\"I have always just been someone who is nervous about everything.  Being around other people, or just worrying about what might happen\"), fatigue, poor concentration, sleep dysfunction.  Patient reports she sleeps approximately 5 hours at night, and takes frequent naps throughout the day.  She reports she often stays up late doing school because that is her primary opportunity to work on her classes.  She reports taking naps during the day when her children are asleep, \"but I should probably be productive during that time, but all I can think about is sleeping\".  Patient reports she does feel it is easier to sleep during the day.  Patient also reports her mood struggles heavily with seasonal changes and cold weather.    Past Psychiatric History: Patient denies any history of psychiatric hospitalizations, suicide attempts, or instances of intentional self-harm.  Patient reports she did therapy for approximately 3 months in 2019, and believes it was helpful.  Her past psychopharmacological history is discussed above.    Substance Use/Abuse: Patient denies tobacco use, alcohol use, or illicit substance use.  She endorses caffeine use in the form of 2 to 3 cans of Mountain Dew per day.    Past Medical/Developmental History: Vaginal delivery x3, appendectomy, and wisdom teeth removal.  Patient has no other known significant past medical history, and denies any history of known developmental delays.    Family Psychiatric History: Patient's father suffers from anxiety and depression for which he does not receive " treatment.  She also reports her maternal grandmother has suffered from anxiety for several years, and takes a medication, but she is unsure what.  There are no known attempted or completed suicides in her family history.    Social History: Patient originally from [place], [place].  She graduated high school from [employer] in 2006.  After high school she attended the [employer] where she earned a dietetics degree in 2011.  Patient is currently working on her masters degree and wants to work with children diagnosed with autism.  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  Patient has been  for the last 7 years.  Her  is a  for [employer].  She has 3-year-old boy twins, and a 1-year-old daughter.  Patient also has a 12-year-old stepson.  She reports her parents are still  and she is close with both.  She is the oldest of 2 children with a 30-year-old brother.  She endorses being close with him.  Patient reports for fun she likes doing anything outdoors, and summertime.      Current Medications:   Current Outpatient Medications   Medication Sig Dispense Refill   • desvenlafaxine (Pristiq) 50 MG 24 hr tablet Take 1 tablet by mouth Daily. 30 tablet 5   • lisdexamfetamine (Vyvanse) 30 MG capsule Take 1 capsule by mouth Every Morning 30 capsule 0     No current facility-administered medications for this visit.       Mental Status Exam:   Hygiene:   good  Cooperation:  Cooperative  Eye Contact:  Good  Psychomotor Behavior:  Appropriate  Affect:  Full range and Appropriate  Mood: anxious  Speech:  Normal  Thought Process:  Goal directed  Thought Content:  Mood congruent  Suicidal:  None  Homicidal:  None  Hallucinations:  None  Delusion:  None  Memory:  Intact  Orientation:  Person, Place, Time and Situation  Reliability:  good  Insight:  Good  Judgement:  Good  Impulse Control:   "Good  Physical/Medical Issues:  No      Objective   Vital Signs:   /68   Pulse 76   Ht 157.5 cm (62\")   Wt 46.7 kg (103 lb)   BMI 18.84 kg/m²     Physical Exam  Neurological:      Mental Status: She is oriented to person, place, and time. Mental status is at baseline.      Coordination: Coordination is intact.      Gait: Gait is intact.   Psychiatric:         Behavior: Behavior is cooperative.         Thought Content: Thought content normal.         Cognition and Memory: Cognition and memory normal.         Judgment: Judgment normal.        Result Review :                   Assessment and Plan    Problem List Items Addressed This Visit     None      Visit Diagnoses     ADHD (attention deficit hyperactivity disorder), inattentive type    -  Primary    Relevant Medications    lisdexamfetamine (Vyvanse) 30 MG capsule    Medication management        Relevant Orders    Compliance Drug Analysis, Ur - Urine, Clean Catch    Generalized anxiety disorder        Relevant Medications    lisdexamfetamine (Vyvanse) 30 MG capsule    Severe episode of recurrent major depressive disorder, without psychotic features (HCC)        Relevant Medications    lisdexamfetamine (Vyvanse) 30 MG capsule          PHQ-9 Score:   PHQ-9 Total Score: 19    Depression Screening:  Patient screened positive for depression based on a PHQ-9 score of 19 on 10/28/2021. Follow-up recommendations include: Prescribed antidepressant medication treatment, Referral to Mental Health specialist and Suicide Risk Assessment performed.      Tobacco Cessation:  Patient has denied an present or past tobacco use. No tobacco cessation education necessary.       Impression/Plan:  -This is my initial interaction with the patient.  Patient presents today as a very pleasant 31-year-old  female.  Patient endorses a history of anxiety, depression, and ADHD.  She reports her symptoms have gotten significantly worse over the last several months.  Patient has " taken various medications in the past with some efficacy.  Her PCP most recently started her on Pristiq 50 mg daily, which she has been taking for approximately 1 week.  Patient's ADHD was treated with Adderall, which she stopped after becoming pregnant with her daughter.  Patient reports when she took Adderall, her ADHD symptoms were greatly improved, but she also feels as though sometimes the medication was too much for her.  She is unsure of what her dose was, but says it was very low.  Patient also had some concerns when taking Adderall regarding her weight.  Patient today weighs 103 pounds, and reiterates she does not want to lose weight.  Discussed medication options with the patient, especially surrounding stimulants.  Advised her appetite suppression and weight loss was a potential adverse effect associated with all stimulants.  Patient expresses understanding of this.  Discussed the use of Pristiq for her mood and anxiety.  Patient reports because she has to started this medication a week ago, she would like to stick with it.  Patient endorses her previous medications (Prozac, Lexapro, Zoloft) had various levels of efficacy, but none really worked as well as she would have liked.  Patient also reports when she did therapy in the past, she believes it was beneficial for her mood and anxiety, and that she would be interested in trying it again.  -Made therapy referral.  -Maintain Pristiq 50 mg daily.  -Start Vyvanse 30 mg daily.  We discussed risks versus benefits, as well as potential adverse effects associated with adding this medication to patient's daily regimen. Patient is in agreement with this plan and was educated on the importance of compliance with all aspects of treatment and follow-up appointments. Patient is agreeable to call the office with any worsening of symptoms or onset of side effects.  Patient provided with printed information regarding this new medication.  -Patient's LENNIE report  reviewed and deemed appropriate.  The patient/guardian endorses medication is taken as prescribed, and denies any abuse or misuse of the medication.  The patient/guardian denies any other substance use or issues.  There are no apparent substance related issues.  The patient reports no adverse effects of the current medication usage and is appropriate to continue with current medication usage at this time.  Scheduled medications can be prescribed by one provider at a time and dispensed from one facility at a time.  They can only be taken as prescribed, and we are not obligated to refill them if lost or stolen.  Reinforced risks and side effects of medication usage, patient and/or guardian verbalize understanding in their own words and are in agreement with current plan.  -The patient is being prescribed a controlled substance as part of the treatment plan. The patient/guardian has been educated of appropriate use of the medication(s), including risks and side effects such as insomnia, headache, exacerbation of tics, nervousness, irritability, overstimulation, tremor, dizziness, anorexia or change in appetite, nausea, dry mouth, constipation, diarrhea, weight loss, sexual dysfunction, psychotic episodes, seizures, palpitations, tachycardia, hypertension, rare activation (activation of hypomania, ellis, and/or suicidal ideations), cardiovascular adverse effects including sudden death (especially patients with pre-existing structural abnormalities often associated with a family history of cardiac disease), may slow normal growth in children, weight gain is reported but not expected, sedation is possible but activation is much more common, metabolic adversities, and overdose among others. Patient/guardian is also informed that the medication is to be used by the patient only, the medication is to be used only as directed, and the medication should not be combined with other substances unless directed by a  Provider/Prescriber. The patient/guardian verbalized understanding and agreement with this in their own words, and wish to continue with current treatment plan.  Controlled substance agreement completed and filed in the patient's chart.  -UDS performed in office today.  -Schedule follow-up for 5 weeks or as needed.      MEDS ORDERED DURING VISIT:  New Medications Ordered This Visit   Medications   • lisdexamfetamine (Vyvanse) 30 MG capsule     Sig: Take 1 capsule by mouth Every Morning     Dispense:  30 capsule     Refill:  0         Follow Up   Return in about 5 weeks (around 12/2/2021), or if symptoms worsen or fail to improve, for Next scheduled follow up.  Patient was given instructions and counseling regarding her condition or for health maintenance advice. Please see specific information pulled into the AVS if appropriate.       TREATMENT PLAN/GOALS: Continue supportive psychotherapy efforts and medications as indicated. Treatment and medication options discussed during today's visit. Patient acknowledged and verbally consented to continue with current treatment plan and was educated on the importance of compliance with treatment and follow-up appointments.    MEDICATION ISSUES:  Discussed medication options and treatment plan of prescribed medication as well as the risks, benefits, and side effects including potential falls, possible impaired driving and metabolic adversities among others. Patient is agreeable to call the office with any worsening of symptoms or onset of side effects. Patient is agreeable to call 911 or go to the nearest ER should he/she begin having SI/HI.            This document has been electronically signed by JENNY Desir, PMHNP-BC  October 28, 2021 16:03 EDT      Part of this note may be an electronic transcription/translation of spoken language to printed text using the Dragon Dictation System.

## 2021-11-04 LAB — DRUGS UR: NORMAL

## 2021-11-10 ENCOUNTER — OFFICE VISIT (OUTPATIENT)
Dept: PSYCHIATRY | Facility: CLINIC | Age: 31
End: 2021-11-10

## 2021-11-10 DIAGNOSIS — F41.1 GENERALIZED ANXIETY DISORDER: Primary | ICD-10-CM

## 2021-11-10 DIAGNOSIS — F33.2 SEVERE EPISODE OF RECURRENT MAJOR DEPRESSIVE DISORDER, WITHOUT PSYCHOTIC FEATURES (HCC): ICD-10-CM

## 2021-11-10 PROCEDURE — 90837 PSYTX W PT 60 MINUTES: CPT | Performed by: SOCIAL WORKER

## 2021-11-10 NOTE — PROGRESS NOTES
"Date: 11/10/2021   Time In: 1333  Time Out: 1427    PROGRESS NOTE  Data:  Amie Sepulveda is a 31 y.o. female who presents today for individual therapy session at [facility].     ICD-10-CM ICD-9-CM   1. Generalized anxiety disorder  F41.1 300.02   2. Severe episode of recurrent major depressive disorder, without psychotic features (HCC)  F33.2 296.33     Patient presents to session regarding concerns with anxiety, depression and ADHD. Patient reports she stays at home with her four children and works [employer]N for the [employer]. Patient reports she is currently working on her master's degree in applied behavior. Patient endorses \"mom guilt\", lack of energy and problems with sleep. Patient reports sleeping 6-8 hours per night and reports it is not consistent sleep. Patient reports increased anxiety and discussed feeling \"tense\" daily. Patient reports her jaw will often become sore from the tension. Patient reports having a  Good support system. Patient discussed goals for therapy are \"being positive\".       Clinical Maneuvering/Intervention:    (Scales based on 0 - 10 with 10 being the worst)  Depression:   10 Anxiety:  7       Assisted patient in processing above session content; acknowledged and normalized patient’s thoughts, feelings, and concerns.  Discussed triggers associated with patient's anxiety.  Also discussed coping skills for patient to implement such as progressive muscle relaxation to help with tension and challenging anxious thoughts. Provided patient with anxiety worksheet from [employer].     Allowed patient to freely discuss issues without interruption or judgment. Provided safe, confidential environment to facilitate the development of positive therapeutic relationship and encourage open, honest communication. Assisted patient in identifying risk factors which would indicate the need for higher level of care including thoughts to harm self or others and/or " self-harming behavior and encouraged patient to contact this office, call 911, or present to the nearest emergency room should any of these events occur. Discussed crisis intervention services and means to access. Patient adamantly and convincingly denies current suicidal or homicidal ideation or perceptual disturbance.    Assessment   Patient appears to maintain relative stability as compared to their baseline.  However, patient continues to struggle with depression and anxiety which continues to cause impairment in important areas of functioning.  As a result, they can be reasonably expected to continue to benefit from treatment and would likely be at increased risk for decompensation otherwise.    Mental Status Exam:   Hygiene:   good  Cooperation:  Cooperative  Eye Contact:  Good  Psychomotor Behavior:  Appropriate  Affect:  Blunted  Mood: anxious  Speech:  Normal  Thought Process:  Goal directed  Thought Content:  Normal  Suicidal:  None  Homicidal:  None  Hallucinations:  None  Delusion:  None  Memory:  Intact  Orientation:  Grossly intact  Reliability:  good  Insight:  Poor  Judgement:  Poor  Impulse Control:  Good  Physical/Medical Issues:  No      Patient's Support Network Includes:      Functional Status: Moderate impairment     Progress toward goal: Not at goal    Prognosis: Good with Ongoing Treatment          Plan     Patient will continue in individual outpatient therapy with focus on improved functioning and coping skills, maintaining stability, and avoiding decompensation and the need for higher level of care.    Patient will adhere to medication regimen as prescribed and report any side effects. Patient will contact this office, call 911 or present to the nearest emergency room should suicidal or homicidal ideations occur. Provide Cognitive Behavioral Therapy and Solution Focused Therapy to improve functioning, maintain stability, and avoid decompensation and the need for higher level of  care.     Return in about Return in about 2 weeks (around 11/24/2021) for Next scheduled follow up. or earlier if symptoms worsen or fail to improve.            This document has been electronically signed by Taylor Demarco LCSW  November 10, 2021 13:34 EST      Part of this note may be an electronic transcription/translation of spoken language to printed text using the Dragon Dictation System.

## 2022-01-04 ENCOUNTER — OFFICE VISIT (OUTPATIENT)
Dept: PSYCHIATRY | Facility: CLINIC | Age: 32
End: 2022-01-04

## 2022-01-04 DIAGNOSIS — F41.1 GENERALIZED ANXIETY DISORDER: ICD-10-CM

## 2022-01-04 DIAGNOSIS — F33.2 SEVERE EPISODE OF RECURRENT MAJOR DEPRESSIVE DISORDER, WITHOUT PSYCHOTIC FEATURES: Primary | ICD-10-CM

## 2022-01-04 DIAGNOSIS — F90.0 ADHD (ATTENTION DEFICIT HYPERACTIVITY DISORDER), INATTENTIVE TYPE: ICD-10-CM

## 2022-01-04 PROCEDURE — 90834 PSYTX W PT 45 MINUTES: CPT | Performed by: SOCIAL WORKER

## 2022-01-04 NOTE — PROGRESS NOTES
"Date: 01/04/2022   Time In: 1334  Time Out: 1422    PROGRESS NOTE  Data:  Amie Sepulveda is a 31 y.o. female who presents today for individual therapy session at [facility].     ICD-10-CM ICD-9-CM   1. Severe episode of recurrent major depressive disorder, without psychotic features (HCC)  F33.2 296.33   2. ADHD (attention deficit hyperactivity disorder), inattentive type  F90.0 314.00   3. Generalized anxiety disorder  F41.1 300.02     Patient reports feeling \"good\" today. Patient discussed practicing progressive muscle relaxation to help reduce tension and anxiety. Patient reports she continues to have \"good and bad\" days. Patient reports feeling her depression medication is not helping with her depressed mood and lack of energy. Patient discussed listening to a new pod cast and reports it has been helpful for thinking more positive. Patient reports she is currently on break from class for the holidays but reports she will be going back next Monday. Patient discussed difficulty with organization and time management with household tasks and class assignments. Patient reports difficulty eating because of exhaustion and reports some days she will not eat anything until dinner. Patient reports feeling overwhelmed with her children and reports she does not get much alone time. Patient goals: working on eating habits, time management and challenging negative thoughts.    Clinical Maneuvering/Intervention:    (Scales based on 0 - 10 with 10 being the worst)  Depression:   7 Anxiety:  7       Assisted patient in processing above session content; acknowledged and normalized patient’s thoughts, feelings, and concerns.  Discussed triggers associated with patient's anxiety.  Also discussed coping skills for patient to implement such as progressive muscle relaxation to help with tension and challenging anxious thoughts. Provided patient with handout from [email or web address] for time management skills. " Encouraged patient to follow up with nutrition and medication provider.    Allowed patient to freely discuss issues without interruption or judgment. Provided safe, confidential environment to facilitate the development of positive therapeutic relationship and encourage open, honest communication. Assisted patient in identifying risk factors which would indicate the need for higher level of care including thoughts to harm self or others and/or self-harming behavior and encouraged patient to contact this office, call 911, or present to the nearest emergency room should any of these events occur. Discussed crisis intervention services and means to access. Patient adamantly and convincingly denies current suicidal or homicidal ideation or perceptual disturbance.    Assessment   Patient appears to maintain relative stability as compared to their baseline.  However, patient continues to struggle with depression and anxiety which continues to cause impairment in important areas of functioning.  As a result, they can be reasonably expected to continue to benefit from treatment and would likely be at increased risk for decompensation otherwise.    Mental Status Exam:   Hygiene:   good  Cooperation:  Cooperative  Eye Contact:  Good  Psychomotor Behavior:  Appropriate  Affect:  Appropriate  Mood: depressed  Speech:  Normal  Thought Process:  Goal directed  Thought Content:  Normal  Suicidal:  None  Homicidal:  None  Hallucinations:  None  Delusion:  None  Memory:  Intact  Orientation:  Grossly intact  Reliability:  good  Insight:  Fair  Judgement:  Poor  Impulse Control:  Good  Physical/Medical Issues:  No      Patient's Support Network Includes:      Functional Status: Moderate impairment     Progress toward goal: Not at goal    Prognosis: Good with Ongoing Treatment          Plan     Patient will continue in individual outpatient therapy with focus on improved functioning and coping skills, maintaining stability, and  avoiding decompensation and the need for higher level of care.    Patient will adhere to medication regimen as prescribed and report any side effects. Patient will contact this office, call 911 or present to the nearest emergency room should suicidal or homicidal ideations occur. Provide Cognitive Behavioral Therapy and Solution Focused Therapy to improve functioning, maintain stability, and avoid decompensation and the need for higher level of care.     Return in about Return in about 2 weeks (around 1/18/2022). or earlier if symptoms worsen or fail to improve.            This document has been electronically signed by Taylor Demarco LCSW  January 4, 2022 13:37 EST      Part of this note may be an electronic transcription/translation of spoken language to printed text using the Dragon Dictation System.

## 2022-01-13 ENCOUNTER — OFFICE VISIT (OUTPATIENT)
Dept: PSYCHIATRY | Facility: CLINIC | Age: 32
End: 2022-01-13

## 2022-01-13 VITALS
HEART RATE: 86 BPM | WEIGHT: 100 LBS | HEIGHT: 62 IN | DIASTOLIC BLOOD PRESSURE: 62 MMHG | BODY MASS INDEX: 18.4 KG/M2 | SYSTOLIC BLOOD PRESSURE: 100 MMHG

## 2022-01-13 DIAGNOSIS — R63.0 DECREASED APPETITE: ICD-10-CM

## 2022-01-13 DIAGNOSIS — F33.2 SEVERE EPISODE OF RECURRENT MAJOR DEPRESSIVE DISORDER, WITHOUT PSYCHOTIC FEATURES: Chronic | ICD-10-CM

## 2022-01-13 DIAGNOSIS — F90.0 ADHD (ATTENTION DEFICIT HYPERACTIVITY DISORDER), INATTENTIVE TYPE: Primary | Chronic | ICD-10-CM

## 2022-01-13 DIAGNOSIS — F41.1 GENERALIZED ANXIETY DISORDER: Chronic | ICD-10-CM

## 2022-01-13 PROCEDURE — 99214 OFFICE O/P EST MOD 30 MIN: CPT | Performed by: NURSE PRACTITIONER

## 2022-01-13 RX ORDER — CYPROHEPTADINE HYDROCHLORIDE 4 MG/1
TABLET ORAL
Qty: 60 TABLET | Refills: 2 | Status: SHIPPED | OUTPATIENT
Start: 2022-01-13 | End: 2022-02-14 | Stop reason: SDUPTHER

## 2022-01-13 RX ORDER — DESVENLAFAXINE 100 MG/1
100 TABLET, EXTENDED RELEASE ORAL DAILY
Qty: 30 TABLET | Refills: 2 | Status: SHIPPED | OUTPATIENT
Start: 2022-01-13 | End: 2022-03-16

## 2022-01-13 NOTE — PROGRESS NOTES
"Chief Complaint  ADHD, Anxiety, and Depression    Subjective          Amie Sepulveda presents to [facility] for medication management of her ADHD, anxiety, depression.    History of Present Illness: Patient presents today for follow-up appointment after last being seen for initial evaluation on 10/28/2021.  Patient reports today she has continued to struggle since her last appointment.  She missed her last appointment secondary to everyone in her family being sick over the period of a month.  Patient reports she has continued to feel very foggy, and feels that her Pristiq might possibly contribute to her fatigue.  She reports she sleeps excessively, approximately 6 to 7 hours at night, and then napping throughout the day.  \"I would sleep more if I could.  I will take a nap any chance I get\".  She says her mood is continued to be low, and her anxiety high.  \"I do think therapy has been helpful, but I am still struggling\".  At her last appointment, she was started on Vyvanse in addition to maintaining her Pristiq.  She took the Vyvanse for 1 month, and says it helped significantly.  She reports school went much better and said \"writing papers was much easier.  I could actually sit and focus on school for the first time since I had taken Adderall\".  Patient did not call for a refill of her Vyvanse because she had to cancel her appointment.  She thought if she did not come to her appointment, she would not be able to get a refill.  Patient reports she continues to struggle with appetite, and acknowledges personal concerns regarding taking her medication and how little she is eating.  Patient denies any SI/HI, A/V hallucinations.    Current Medications:   Current Outpatient Medications   Medication Sig Dispense Refill   • desvenlafaxine (Pristiq) 100 MG 24 hr tablet Take 1 tablet by mouth Daily. 30 tablet 2   • cyproheptadine (PERIACTIN) 4 MG tablet 1 tablet Every Morning for 7 " "days, THEN 1 tablet 2 (Two) Times a Day 60 tablet 2   • lisdexamfetamine (Vyvanse) 30 MG capsule Take 1 capsule by mouth Every Morning 30 capsule 0     No current facility-administered medications for this visit.       Mental Status Exam:   Hygiene:   good  Cooperation:  Cooperative  Eye Contact:  Good  Psychomotor Behavior:  Appropriate  Affect:  Full range and Appropriate  Mood: depressed  Speech:  Normal  Thought Process:  Goal directed  Thought Content:  Mood congruent  Suicidal:  None  Homicidal:  None  Hallucinations:  None  Delusion:  None  Memory:  Intact  Orientation:  Person, Place, Time and Situation  Reliability:  good  Insight:  Good  Judgement:  Good  Impulse Control:  Good  Physical/Medical Issues:  No        Objective   Vital Signs:   /62   Pulse 86   Ht 157.5 cm (62\")   Wt 45.4 kg (100 lb)   BMI 18.29 kg/m²     Physical Exam  Neurological:      Mental Status: She is oriented to person, place, and time. Mental status is at baseline.      Coordination: Coordination is intact.      Gait: Gait is intact.   Psychiatric:         Behavior: Behavior is cooperative.        Result Review :     The following data was reviewed by: JENNY Ross on 01/13/2022:    Data reviewed: Previous note, medication history          Assessment and Plan    Problem List Items Addressed This Visit     None      Visit Diagnoses     ADHD (attention deficit hyperactivity disorder), inattentive type  (Chronic)   -  Primary    Relevant Medications    lisdexamfetamine (Vyvanse) 30 MG capsule    desvenlafaxine (Pristiq) 100 MG 24 hr tablet    cyproheptadine (PERIACTIN) 4 MG tablet    Severe episode of recurrent major depressive disorder, without psychotic features (HCC)  (Chronic)       Relevant Medications    lisdexamfetamine (Vyvanse) 30 MG capsule    desvenlafaxine (Pristiq) 100 MG 24 hr tablet    Generalized anxiety disorder  (Chronic)       Relevant Medications    lisdexamfetamine (Vyvanse) 30 MG capsule    " desvenlafaxine (Pristiq) 100 MG 24 hr tablet    Decreased appetite        Relevant Medications    cyproheptadine (PERIACTIN) 4 MG tablet          PHQ-9 Score:   PHQ-9 Total Score: 21    Depression Screening:  Patient screened positive for depression based on a PHQ-9 score of 21 on 1/13/2022. Follow-up recommendations include: Prescribed antidepressant medication treatment and Suicide Risk Assessment performed.      Tobacco Cessation:  Patient has denied an present or past tobacco use. No tobacco cessation education necessary.       Impression/Plan:  -This is a follow-up appointment.  Patient presents today and reports she has struggled since her last appointment.  Patient feels her current medication regimen is not working sufficiently for her mood and anxiety.  Patient has not had any Vyvanse for approximately 1 month after having to cancel her last appointment.  Patient reports the month she took the medication it worked very well, and she endorses very good efficacy from it.  Patient has continued to have struggles with her appetite.  She is 3 pounds lower than she was at her initial evaluation.  Educated patient on the importance of eating while she is taking her medications.  Patient has considered seeing a dietitian.  Discussed adding a medication to her regimen to help against the further appetite suppression from her Vyvanse.  Patient reports she is open to this.  -Increase Pristiq to 100 mg daily.  -Maintain Vyvanse 30 mg daily.  -Start Cyprohepatadine 4 mg daily x7 days, then increase to 4 mg twice daily.  -We discussed risks versus benefits, as well as potential adverse effects associated with adding this medication to patient's daily regimen. Patient is in agreement with this plan and was educated on the importance of compliance with all aspects of treatment and follow-up appointments. Patient is agreeable to call the office with any worsening of symptoms or onset of side effects.  -The patient is being  prescribed a controlled substance as part of the treatment plan. The patient/guardian has been educated of appropriate use of the medication(s), including risks and side effects such as insomnia, headache, exacerbation of tics, nervousness, irritability, overstimulation, tremor, dizziness, anorexia or change in appetite, nausea, dry mouth, constipation, diarrhea, weight loss, sexual dysfunction, psychotic episodes, seizures, palpitations, tachycardia, hypertension, rare activation (activation of hypomania, ellis, and/or suicidal ideations), cardiovascular adverse effects including sudden death (especially patients with pre-existing structural abnormalities often associated with a family history of cardiac disease), may slow normal growth in children, weight gain is reported but not expected, sedation is possible but activation is much more common, metabolic adversities, and overdose among others. Patient/guardian is also informed that the medication is to be used by the patient only, the medication is to be used only as directed, and the medication should not be combined with other substances unless directed by a Provider/Prescriber. The patient/guardian verbalized understanding and agreement with this in their own words, and wish to continue with current treatment plan.  Controlled substance agreement completed and filed in the patient's chart.  -Patient's LENNIE report reviewed and deemed appropriate.  -Most recent UDS performed 10/28/2021.  -Encourage patient to maintain upcoming therapy appointments.  -Schedule follow-up for 1 month or as needed.      MEDS ORDERED DURING VISIT:  New Medications Ordered This Visit   Medications   • lisdexamfetamine (Vyvanse) 30 MG capsule     Sig: Take 1 capsule by mouth Every Morning     Dispense:  30 capsule     Refill:  0   • desvenlafaxine (Pristiq) 100 MG 24 hr tablet     Sig: Take 1 tablet by mouth Daily.     Dispense:  30 tablet     Refill:  2   • cyproheptadine  (PERIACTIN) 4 MG tablet     Si tablet Every Morning for 7 days, THEN 1 tablet 2 (Two) Times a Day     Dispense:  60 tablet     Refill:  2         Follow Up   Return in about 1 month (around 2022), or if symptoms worsen or fail to improve, for Next scheduled follow up.  Patient was given instructions and counseling regarding her condition or for health maintenance advice. Please see specific information pulled into the AVS if appropriate.       TREATMENT PLAN/GOALS: Continue supportive psychotherapy efforts and medications as indicated. Treatment and medication options discussed during today's visit. Patient acknowledged and verbally consented to continue with current treatment plan and was educated on the importance of compliance with treatment and follow-up appointments.    MEDICATION ISSUES:  Discussed medication options and treatment plan of prescribed medication as well as the risks, benefits, and side effects including potential falls, possible impaired driving and metabolic adversities among others. Patient is agreeable to call the office with any worsening of symptoms or onset of side effects. Patient is agreeable to call 911 or go to the nearest ER should he/she begin having SI/HI.          This document has been electronically signed by JENNY Desir, PMHNP-BC  2022 10:32 EST      Part of this note may be an electronic transcription/translation of spoken language to printed text using the Dragon Dictation System.

## 2022-02-14 DIAGNOSIS — F90.0 ADHD (ATTENTION DEFICIT HYPERACTIVITY DISORDER), INATTENTIVE TYPE: Chronic | ICD-10-CM

## 2022-02-14 DIAGNOSIS — R63.0 DECREASED APPETITE: ICD-10-CM

## 2022-02-14 RX ORDER — CYPROHEPTADINE HYDROCHLORIDE 4 MG/1
4 TABLET ORAL 2 TIMES DAILY
Qty: 60 TABLET | Refills: 2 | Status: SHIPPED | OUTPATIENT
Start: 2022-02-14 | End: 2022-03-16 | Stop reason: SDUPTHER

## 2022-02-14 NOTE — TELEPHONE ENCOUNTER
Rx Refill Note  Requested Prescriptions     Pending Prescriptions Disp Refills   • cyproheptadine (PERIACTIN) 4 MG tablet 60 tablet 2     Si tablet Every Morning for 7 days, THEN 1 tablet 2 (Two) Times a Day   • lisdexamfetamine (Vyvanse) 30 MG capsule 30 capsule 0     Sig: Take 1 capsule by mouth Every Morning      Last office visit with prescribing clinician: 2022      Next office visit with prescribing clinician: 3/16/2022            Paige Yoder MA  22, 08:27 EST

## 2022-03-16 ENCOUNTER — TELEMEDICINE (OUTPATIENT)
Dept: PSYCHIATRY | Facility: CLINIC | Age: 32
End: 2022-03-16

## 2022-03-16 DIAGNOSIS — F90.0 ADHD (ATTENTION DEFICIT HYPERACTIVITY DISORDER), INATTENTIVE TYPE: Primary | Chronic | ICD-10-CM

## 2022-03-16 DIAGNOSIS — F41.1 GENERALIZED ANXIETY DISORDER: Chronic | ICD-10-CM

## 2022-03-16 DIAGNOSIS — R63.0 DECREASED APPETITE: ICD-10-CM

## 2022-03-16 DIAGNOSIS — F33.2 SEVERE EPISODE OF RECURRENT MAJOR DEPRESSIVE DISORDER, WITHOUT PSYCHOTIC FEATURES: Chronic | ICD-10-CM

## 2022-03-16 PROCEDURE — 99214 OFFICE O/P EST MOD 30 MIN: CPT | Performed by: NURSE PRACTITIONER

## 2022-03-16 RX ORDER — DESVENLAFAXINE SUCCINATE 50 MG/1
TABLET, EXTENDED RELEASE ORAL
Qty: 14 TABLET | Refills: 0 | Status: SHIPPED | OUTPATIENT
Start: 2022-03-16 | End: 2022-05-05

## 2022-03-16 RX ORDER — FLUOXETINE HYDROCHLORIDE 40 MG/1
40 CAPSULE ORAL DAILY
Qty: 30 CAPSULE | Refills: 2 | Status: SHIPPED | OUTPATIENT
Start: 2022-03-16 | End: 2022-05-24 | Stop reason: SDUPTHER

## 2022-03-16 RX ORDER — DEXTROAMPHETAMINE SACCHARATE, AMPHETAMINE ASPARTATE MONOHYDRATE, DEXTROAMPHETAMINE SULFATE AND AMPHETAMINE SULFATE 3.75; 3.75; 3.75; 3.75 MG/1; MG/1; MG/1; MG/1
15 CAPSULE, EXTENDED RELEASE ORAL EVERY MORNING
Qty: 30 CAPSULE | Refills: 0 | Status: SHIPPED | OUTPATIENT
Start: 2022-03-16 | End: 2022-04-18 | Stop reason: SDUPTHER

## 2022-03-16 RX ORDER — CYPROHEPTADINE HYDROCHLORIDE 4 MG/1
4 TABLET ORAL 2 TIMES DAILY
Qty: 60 TABLET | Refills: 2 | Status: SHIPPED | OUTPATIENT
Start: 2022-03-16 | End: 2022-05-05

## 2022-03-16 RX ORDER — FLUOXETINE HYDROCHLORIDE 20 MG/1
20 CAPSULE ORAL DAILY
Qty: 14 CAPSULE | Refills: 0 | Status: SHIPPED | OUTPATIENT
Start: 2022-03-16 | End: 2022-05-05

## 2022-03-16 NOTE — PROGRESS NOTES
"This provider is located at [facility] ,[address], [address] in [place], [place],using a secure [employer] Video Visit through [employer]. Patient is being seen remotely via telehealth at their home address in [place], and stated they are in a secure environment for this session. The patient's condition being diagnosed/treated is appropriate for telemedicine. The provider identified herself as well as her credentials.   The patient, and/or patients guardian, consent to be seen remotely, and when consent is given they understand that the consent allows for patient identifiable information to be sent to a third party as needed.   They may refuse to be seen remotely at any time. The electronic data is encrypted and password protected, and the patient and/or guardian has been advised of the potential risks to privacy not withstanding such measures.    Chief Complaint  ADHD, Anxiety, and Depression      Subjective          Amie Sepulveda presents today via [employer] Video through [employer] for medication management of her ADHD, anxiety, and depression.    History of Present Illness: Patient presents today via [employer] video for follow-up appointment after last being seen on 01/13/2022.  Patient says \"I think I am doing okay\".  She says she is still struggling with her depression and anxiety most days, but does say it might be a little better.  She is still taking her Vyvanse, and says it has been beneficial to her focus, but has not helped as much as Adderall did when she took it in the past.  Patient also expresses some concern regarding how much it is costing her on a monthly basis.  Patient says her appetite has been a little bit better since starting cyprohepatadine, but feels she still has some lingering taste and smell issues from COVID approximately 6 months ago.  She feels this is contributing to her appetite.  Patient reports she weighed at home today, and was 103 pounds.  " Patient reports she has still been struggling with sleep, and is very sleepy throughout the day.  She says this is likely due to staying up late doing homework and housework after her kids are in bed.  Patient denies any SI/HI, A/V hallucinations.    Current Medications:   Current Outpatient Medications   Medication Sig Dispense Refill   • cyproheptadine (PERIACTIN) 4 MG tablet Take 1 tablet by mouth 2 (Two) Times a Day. 60 tablet 2   • amphetamine-dextroamphetamine XR (Adderall XR) 15 MG 24 hr capsule Take 1 capsule by mouth Every Morning 30 capsule 0   • desvenlafaxine (Pristiq) 50 MG 24 hr tablet Take 1 tablet daily for 7 days, then take 1 tablet every other day until gone. 14 tablet 0   • FLUoxetine (PROzac) 20 MG capsule Take 1 capsule by mouth Daily. 14 capsule 0   • FLUoxetine (PROzac) 40 MG capsule Take 1 capsule by mouth Daily. 30 capsule 2     No current facility-administered medications for this visit.       Mental Status Exam:   Hygiene:   MyChart video  Cooperation:  Cooperative  Eye Contact:  Good  Psychomotor Behavior:  Appropriate  Affect:  Appropriate  Mood: anxious  Speech:  Normal  Thought Process:  Goal directed  Thought Content:  Mood congruent  Suicidal:  None  Homicidal:  None  Hallucinations:  None  Delusion:  None  Memory:  Intact  Orientation:  Person, Place, Time and Situation  Reliability:  good  Insight:  Good  Judgement:  Good  Impulse Control:  Good  Physical/Medical Issues:  No      Objective   Vital Signs:   There were no vitals taken for this visit.    Physical Exam  Neurological:      Mental Status: She is oriented to person, place, and time. Mental status is at baseline.   Psychiatric:         Behavior: Behavior is cooperative.        Result Review :     The following data was reviewed by: JENNY Ross on 03/16/2022:    Data reviewed: Previous note, medication history          Assessment and Plan    Problem List Items Addressed This Visit    None     Visit Diagnoses      ADHD (attention deficit hyperactivity disorder), inattentive type  (Chronic)   -  Primary    Relevant Medications    desvenlafaxine (Pristiq) 50 MG 24 hr tablet    FLUoxetine (PROzac) 20 MG capsule    FLUoxetine (PROzac) 40 MG capsule    amphetamine-dextroamphetamine XR (Adderall XR) 15 MG 24 hr capsule    cyproheptadine (PERIACTIN) 4 MG tablet    Severe episode of recurrent major depressive disorder, without psychotic features (HCC)  (Chronic)       Relevant Medications    desvenlafaxine (Pristiq) 50 MG 24 hr tablet    FLUoxetine (PROzac) 20 MG capsule    FLUoxetine (PROzac) 40 MG capsule    amphetamine-dextroamphetamine XR (Adderall XR) 15 MG 24 hr capsule    Generalized anxiety disorder  (Chronic)       Relevant Medications    desvenlafaxine (Pristiq) 50 MG 24 hr tablet    FLUoxetine (PROzac) 20 MG capsule    FLUoxetine (PROzac) 40 MG capsule    amphetamine-dextroamphetamine XR (Adderall XR) 15 MG 24 hr capsule    Decreased appetite        Relevant Medications    cyproheptadine (PERIACTIN) 4 MG tablet              Tobacco Cessation:  Patient has denied an present or past tobacco use. No tobacco cessation education necessary.       Impression/Plan:  -This is a follow-up appointment.  Patient presents today and reports she has continued to struggle with many of her symptoms associated with her depression and anxiety, as well as her ADHD.  She feels the Vyvanse has helped some, but not as well as she needs it to.  Patient also says it has been expensive.  Patient does not feel the Pristiq is helping her significantly despite the increase at her last appointment.  Patient reports the medication she has taken in the past that helped the most was Prozac.  Patient reports she would be interested in trying that again.  -Discontinue Pristiq.  Patient will take 50 mg daily x7 days, then 50 mg every other day for 7 days, and then stop.  -Discontinue Vyvanse 30 mg daily.  -Start Prozac 20 mg daily x14 days, then increase to  40 mg daily after.  -Start Adderall XR 15 mg daily.  -The patient is being prescribed a controlled substance as part of the treatment plan. The patient/guardian has been educated of appropriate use of the medication(s), including risks and side effects such as insomnia, headache, exacerbation of tics, nervousness, irritability, overstimulation, tremor, dizziness, anorexia or change in appetite, nausea, dry mouth, constipation, diarrhea, weight loss, sexual dysfunction, psychotic episodes, seizures, palpitations, tachycardia, hypertension, rare activation (activation of hypomania, ellis, and/or suicidal ideations), cardiovascular adverse effects including sudden death (especially patients with pre-existing structural abnormalities often associated with a family history of cardiac disease), may slow normal growth in children, weight gain is reported but not expected, sedation is possible but activation is much more common, metabolic adversities, and overdose among others. Patient/guardian is also informed that the medication is to be used by the patient only, the medication is to be used only as directed, and the medication should not be combined with other substances unless directed by a Provider/Prescriber. The patient/guardian verbalized understanding and agreement with this in their own words, and wish to continue with current treatment plan.  Controlled substance agreement completed and filed in the patient's chart.  -Patient's LENNIE report reviewed and deemed appropriate.  UDS performed 10/28/2021 was appropriate.  -Schedule follow-up for 1 month or as needed.      MEDS ORDERED DURING VISIT:  New Medications Ordered This Visit   Medications   • desvenlafaxine (Pristiq) 50 MG 24 hr tablet     Sig: Take 1 tablet daily for 7 days, then take 1 tablet every other day until gone.     Dispense:  14 tablet     Refill:  0   • FLUoxetine (PROzac) 20 MG capsule     Sig: Take 1 capsule by mouth Daily.     Dispense:  14  capsule     Refill:  0   • FLUoxetine (PROzac) 40 MG capsule     Sig: Take 1 capsule by mouth Daily.     Dispense:  30 capsule     Refill:  2   • amphetamine-dextroamphetamine XR (Adderall XR) 15 MG 24 hr capsule     Sig: Take 1 capsule by mouth Every Morning     Dispense:  30 capsule     Refill:  0   • cyproheptadine (PERIACTIN) 4 MG tablet     Sig: Take 1 tablet by mouth 2 (Two) Times a Day.     Dispense:  60 tablet     Refill:  2         Follow Up   Return in about 1 month (around 4/16/2022), or if symptoms worsen or fail to improve, for Next scheduled follow up.  Patient was given instructions and counseling regarding her condition or for health maintenance advice. Please see specific information pulled into the AVS if appropriate.       TREATMENT PLAN/GOALS: Continue supportive psychotherapy efforts and medications as indicated. Treatment and medication options discussed during today's visit. Patient acknowledged and verbally consented to continue with current treatment plan and was educated on the importance of compliance with treatment and follow-up appointments.    MEDICATION ISSUES:  Discussed medication options and treatment plan of prescribed medication as well as the risks, benefits, and side effects including potential falls, possible impaired driving and metabolic adversities among others. Patient is agreeable to call the office with any worsening of symptoms or onset of side effects. Patient is agreeable to call 911 or go to the nearest ER should he/she begin having SI/HI.          This document has been electronically signed by JENNY Desir, PMHNP-BC  March 16, 2022 10:45 EDT      Part of this note may be an electronic transcription/translation of spoken language to printed text using the Dragon Dictation System.

## 2022-04-18 DIAGNOSIS — R63.0 DECREASED APPETITE: ICD-10-CM

## 2022-04-18 DIAGNOSIS — F90.0 ADHD (ATTENTION DEFICIT HYPERACTIVITY DISORDER), INATTENTIVE TYPE: Chronic | ICD-10-CM

## 2022-04-18 RX ORDER — DEXTROAMPHETAMINE SACCHARATE, AMPHETAMINE ASPARTATE MONOHYDRATE, DEXTROAMPHETAMINE SULFATE AND AMPHETAMINE SULFATE 3.75; 3.75; 3.75; 3.75 MG/1; MG/1; MG/1; MG/1
15 CAPSULE, EXTENDED RELEASE ORAL EVERY MORNING
Qty: 30 CAPSULE | Refills: 0 | Status: SHIPPED | OUTPATIENT
Start: 2022-04-18 | End: 2022-05-24 | Stop reason: SDUPTHER

## 2022-04-18 RX ORDER — CYPROHEPTADINE HYDROCHLORIDE 4 MG/1
4 TABLET ORAL 2 TIMES DAILY
Qty: 60 TABLET | Refills: 2 | Status: CANCELLED | OUTPATIENT
Start: 2022-04-18

## 2022-04-18 NOTE — TELEPHONE ENCOUNTER
Rx Refill Note  Requested Prescriptions     Pending Prescriptions Disp Refills   • amphetamine-dextroamphetamine XR (Adderall XR) 15 MG 24 hr capsule 30 capsule 0     Sig: Take 1 capsule by mouth Every Morning   • cyproheptadine (PERIACTIN) 4 MG tablet 60 tablet 2     Sig: Take 1 tablet by mouth 2 (Two) Times a Day.      Last office visit with prescribing clinician: 1/13/2022      Next office visit with prescribing clinician: 5/5/2022            Rose Ellison MA  04/18/22, 10:02 EDT

## 2022-04-18 NOTE — TELEPHONE ENCOUNTER
Sending refill for Adderall. I refilled her Cyproheptadine last month, she should have 2 refills at her pharmacy. Thank you.

## 2022-04-18 NOTE — TELEPHONE ENCOUNTER
Chart review completed and medication refill approved.   Patient's LENNIE report reviewed and deemed appropriate.

## 2022-05-05 ENCOUNTER — TELEMEDICINE (OUTPATIENT)
Dept: PSYCHIATRY | Facility: CLINIC | Age: 32
End: 2022-05-05

## 2022-05-05 VITALS
DIASTOLIC BLOOD PRESSURE: 62 MMHG | HEIGHT: 62 IN | SYSTOLIC BLOOD PRESSURE: 104 MMHG | HEART RATE: 75 BPM | BODY MASS INDEX: 18.4 KG/M2 | WEIGHT: 100 LBS

## 2022-05-05 DIAGNOSIS — R63.0 DECREASED APPETITE: ICD-10-CM

## 2022-05-05 DIAGNOSIS — F90.0 ADHD (ATTENTION DEFICIT HYPERACTIVITY DISORDER), INATTENTIVE TYPE: Primary | Chronic | ICD-10-CM

## 2022-05-05 DIAGNOSIS — F41.1 GENERALIZED ANXIETY DISORDER: Chronic | ICD-10-CM

## 2022-05-05 DIAGNOSIS — F33.0 MILD EPISODE OF RECURRENT MAJOR DEPRESSIVE DISORDER: ICD-10-CM

## 2022-05-05 PROCEDURE — 99214 OFFICE O/P EST MOD 30 MIN: CPT | Performed by: NURSE PRACTITIONER

## 2022-05-05 NOTE — PROGRESS NOTES
"Chief Complaint  ADHD, Anxiety, and Depression    Subjective          Amie Sepulveda presents to [facility] for medication management of her ADHD, anxiety, and depression.    History of Present Illness: Patient presents today for follow-up appointment after last being seen on 03/16/2022.  Patient says \"it has been a rough week, but I am doing well overall\".  At her last appointment, the patient was switched from Vyvanse to Adderall XR, and from Pristiq to Prozac.  Patient says the switch to Adderall has gone well, and she feels it has helped her ADHD symptoms, while not causing her to be as tired as the Vyvanse did.  Patient also says \"the switch back to Prozac has made a huge difference\".  She says she feels better overall, has had an increase in her energy, and is finding it easier to get out of bed.  Patient says everyone in her house has gone through a stomach virus over the last week or so and that sleep has been difficult because of that, but previously was going well.  Patient also reports she is no longer taking the cyproheptadine secondary to continued sedation.  Patient denies any SI/HI, A/V hallucinations.    Current Medications:   Current Outpatient Medications   Medication Sig Dispense Refill   • amphetamine-dextroamphetamine XR (Adderall XR) 15 MG 24 hr capsule Take 1 capsule by mouth Every Morning 30 capsule 0   • FLUoxetine (PROzac) 40 MG capsule Take 1 capsule by mouth Daily. 30 capsule 2     No current facility-administered medications for this visit.       Mental Status Exam:   Hygiene:   good  Cooperation:  Cooperative  Eye Contact:  Good  Psychomotor Behavior:  Appropriate  Affect:  Appropriate  Mood: euthymic  Speech:  Normal  Thought Process:  Goal directed  Thought Content:  Mood congruent  Suicidal:  None  Homicidal:  None  Hallucinations:  None  Delusion:  None  Memory:  Intact  Orientation:  Person, Place, Time and Situation  Reliability:  " "good  Insight:  Good  Judgement:  Good  Impulse Control:  Good  Physical/Medical Issues:  No        Objective   Vital Signs:   /62   Pulse 75   Ht 157.5 cm (62\")   Wt 45.4 kg (100 lb)   BMI 18.29 kg/m²     Physical Exam  Neurological:      Mental Status: She is oriented to person, place, and time. Mental status is at baseline.      Coordination: Coordination is intact.      Gait: Gait is intact.   Psychiatric:         Behavior: Behavior is cooperative.        Result Review :     The following data was reviewed by: JENNY Ross on 05/05/2022:    Data reviewed: Previous note, medication history          Assessment and Plan    Problem List Items Addressed This Visit    None     Visit Diagnoses     ADHD (attention deficit hyperactivity disorder), inattentive type  (Chronic)   -  Primary    Mild episode of recurrent major depressive disorder (HCC)        Generalized anxiety disorder  (Chronic)       Decreased appetite              PHQ-9 Score:   PHQ-9 Total Score: 8      Depression Screening:  Patient screened positive for depression based on a PHQ-9 score of 8 on 5/5/2022. Follow-up recommendations include: Prescribed antidepressant medication treatment and Suicide Risk Assessment performed.        Tobacco Cessation:  Patient has denied an present or past tobacco use. No tobacco cessation education necessary.       Impression/Plan:  -This is a follow-up appointment.  Patient presents today and reports she has been doing well since her last appointment.  She does endorse some struggles over the last week with her entire family being sick at one point, but says they are doing better now.  She has been taking her medications as prescribed, and denies any adverse effects associated with them.  She does report she is no longer taking cyproheptadine secondary to it causing her to feel too sedated after taking it.  She does inquire about the possibility of starting a different medication to help stimulate her " appetite.  We discussed other possible options, however most of them also cause sedation.  Patient would like to avoid that.  -Discontinue cyproheptadine 4 mg twice daily.  -Maintain Prozac 40 mg daily.  -Maintain Adderall XR 15 mg daily.  -The patient is being prescribed a controlled substance as part of the treatment plan. The patient/guardian has been educated of appropriate use of the medication(s), including risks and side effects such as insomnia, headache, exacerbation of tics, nervousness, irritability, overstimulation, tremor, dizziness, anorexia or change in appetite, nausea, dry mouth, constipation, diarrhea, weight loss, sexual dysfunction, psychotic episodes, seizures, palpitations, tachycardia, hypertension, rare activation (activation of hypomania, ellis, and/or suicidal ideations), cardiovascular adverse effects including sudden death (especially patients with pre-existing structural abnormalities often associated with a family history of cardiac disease), may slow normal growth in children, weight gain is reported but not expected, sedation is possible but activation is much more common, metabolic adversities, and overdose among others. Patient/guardian is also informed that the medication is to be used by the patient only, the medication is to be used only as directed, and the medication should not be combined with other substances unless directed by a Provider/Prescriber. The patient/guardian verbalized understanding and agreement with this in their own words, and wish to continue with current treatment plan.  Controlled substance agreement completed and filed in the patient's chart.  -Patient's LENNIE report reviewed and deemed appropriate.  UDS performed 10/28/2021.  -Schedule follow-up appointment for 2 months or as needed.  UDS at follow-up.      MEDS ORDERED DURING VISIT:  No orders of the defined types were placed in this encounter.        Follow Up   Return in about 2 months (around  7/5/2022), or if symptoms worsen or fail to improve, for Next scheduled follow up.  Patient was given instructions and counseling regarding her condition or for health maintenance advice. Please see specific information pulled into the AVS if appropriate.       TREATMENT PLAN/GOALS: Continue supportive psychotherapy efforts and medications as indicated. Treatment and medication options discussed during today's visit. Patient acknowledged and verbally consented to continue with current treatment plan and was educated on the importance of compliance with treatment and follow-up appointments.    MEDICATION ISSUES:  Discussed medication options and treatment plan of prescribed medication as well as the risks, benefits, and side effects including potential falls, possible impaired driving and metabolic adversities among others. Patient is agreeable to call the office with any worsening of symptoms or onset of side effects. Patient is agreeable to call 911 or go to the nearest ER should he/she begin having SI/HI.          This document has been electronically signed by JENNY Desir, PMHNP-BC  May 5, 2022 14:47 EDT      Part of this note may be an electronic transcription/translation of spoken language to printed text using the Dragon Dictation System.

## 2022-05-24 DIAGNOSIS — F33.2 SEVERE EPISODE OF RECURRENT MAJOR DEPRESSIVE DISORDER, WITHOUT PSYCHOTIC FEATURES: Chronic | ICD-10-CM

## 2022-05-24 DIAGNOSIS — F41.1 GENERALIZED ANXIETY DISORDER: Chronic | ICD-10-CM

## 2022-05-24 DIAGNOSIS — F90.0 ADHD (ATTENTION DEFICIT HYPERACTIVITY DISORDER), INATTENTIVE TYPE: Chronic | ICD-10-CM

## 2022-05-24 RX ORDER — FLUOXETINE HYDROCHLORIDE 40 MG/1
40 CAPSULE ORAL DAILY
Qty: 30 CAPSULE | Refills: 2 | Status: SHIPPED | OUTPATIENT
Start: 2022-05-24 | End: 2022-09-02 | Stop reason: SDUPTHER

## 2022-05-24 RX ORDER — DEXTROAMPHETAMINE SACCHARATE, AMPHETAMINE ASPARTATE MONOHYDRATE, DEXTROAMPHETAMINE SULFATE AND AMPHETAMINE SULFATE 3.75; 3.75; 3.75; 3.75 MG/1; MG/1; MG/1; MG/1
15 CAPSULE, EXTENDED RELEASE ORAL EVERY MORNING
Qty: 30 CAPSULE | Refills: 0 | Status: SHIPPED | OUTPATIENT
Start: 2022-05-24 | End: 2022-07-05 | Stop reason: SDUPTHER

## 2022-05-24 NOTE — TELEPHONE ENCOUNTER
Rx Refill Note  Requested Prescriptions     Pending Prescriptions Disp Refills   • amphetamine-dextroamphetamine XR (Adderall XR) 15 MG 24 hr capsule 30 capsule 0     Sig: Take 1 capsule by mouth Every Morning   • FLUoxetine (PROzac) 40 MG capsule 30 capsule 2     Sig: Take 1 capsule by mouth Daily.      Last office visit with prescribing clinician: 1/13/2022      Next office visit with prescribing clinician: 7/6/2022            Rose Ellison MA  05/24/22, 09:48 EDT

## 2022-07-05 DIAGNOSIS — F90.0 ADHD (ATTENTION DEFICIT HYPERACTIVITY DISORDER), INATTENTIVE TYPE: Chronic | ICD-10-CM

## 2022-07-05 NOTE — TELEPHONE ENCOUNTER
Rx Refill Note  Requested Prescriptions     Pending Prescriptions Disp Refills   • amphetamine-dextroamphetamine XR (Adderall XR) 15 MG 24 hr capsule 30 capsule 0     Sig: Take 1 capsule by mouth Every Morning      Last office visit with prescribing clinician: 1/13/2022      Next office visit with prescribing clinician: 7/6/2022            Rose Ellison MA  07/05/22, 16:09 EDT

## 2022-07-06 ENCOUNTER — OFFICE VISIT (OUTPATIENT)
Dept: PSYCHIATRY | Facility: CLINIC | Age: 32
End: 2022-07-06

## 2022-07-06 VITALS
HEIGHT: 62 IN | WEIGHT: 102 LBS | HEART RATE: 66 BPM | SYSTOLIC BLOOD PRESSURE: 108 MMHG | BODY MASS INDEX: 18.77 KG/M2 | DIASTOLIC BLOOD PRESSURE: 62 MMHG

## 2022-07-06 DIAGNOSIS — F90.0 ADHD (ATTENTION DEFICIT HYPERACTIVITY DISORDER), INATTENTIVE TYPE: Primary | Chronic | ICD-10-CM

## 2022-07-06 DIAGNOSIS — F41.1 GENERALIZED ANXIETY DISORDER: Chronic | ICD-10-CM

## 2022-07-06 DIAGNOSIS — F33.0 MILD EPISODE OF RECURRENT MAJOR DEPRESSIVE DISORDER: Chronic | ICD-10-CM

## 2022-07-06 PROCEDURE — 99214 OFFICE O/P EST MOD 30 MIN: CPT | Performed by: NURSE PRACTITIONER

## 2022-07-06 RX ORDER — DEXTROAMPHETAMINE SACCHARATE, AMPHETAMINE ASPARTATE MONOHYDRATE, DEXTROAMPHETAMINE SULFATE AND AMPHETAMINE SULFATE 3.75; 3.75; 3.75; 3.75 MG/1; MG/1; MG/1; MG/1
15 CAPSULE, EXTENDED RELEASE ORAL EVERY MORNING
Qty: 30 CAPSULE | Refills: 0 | Status: SHIPPED | OUTPATIENT
Start: 2022-07-06 | End: 2022-08-04 | Stop reason: SDUPTHER

## 2022-07-06 NOTE — PROGRESS NOTES
"Chief Complaint  ADHD, Anxiety, and Depression    Subjective          Amie Sepulveda presents to [facility] for medication management of her anxiety, depression, ADHD symptoms.    History of Present Illness: Patient presents today for follow-up appointment after last being seen on 05/05/2022.  Patient reports today \"overall I feel like my depression is better, but I am still struggling with some anxiety\".  Patient says she is unsure why she is so anxious.  She does endorse stress at home, specifically around finances.  Patient has not been working because of school and her children, however says her twins recently started [facility] therapy on a daily basis, and she is almost done with school.  She is going to look for work, hopefully in the [employer] field since that is what she is going to school for.  Patient reports she has continued to take her Adderall and Prozac on a daily basis.  She feels her Adderall continues to work very well for her ADHD symptoms, and that her mood has improved with Prozac.  She reports she is sleeping and eating well, and denies issues with either.  She is currently on a break from school, and says that has helped with her sleep.  Patient denies any SI/HI, A/V hallucinations.      Current Medications:   Current Outpatient Medications   Medication Sig Dispense Refill   • amphetamine-dextroamphetamine XR (Adderall XR) 15 MG 24 hr capsule Take 1 capsule by mouth Every Morning 30 capsule 0   • FLUoxetine (PROzac) 40 MG capsule Take 1 capsule by mouth Daily. 30 capsule 2     No current facility-administered medications for this visit.       Mental Status Exam:   Hygiene:   good  Cooperation:  Cooperative  Eye Contact:  Good  Psychomotor Behavior:  Appropriate  Affect:  Appropriate  Mood: euthymic  Speech:  Normal  Thought Process:  Goal directed  Thought Content:  Mood congruent  Suicidal:  None  Homicidal:  None  Hallucinations:  None  Delusion:  None  Memory:  " "Intact  Orientation:  Person, Place, Time and Situation  Reliability:  good  Insight:  Good  Judgement:  Good  Impulse Control:  Good  Physical/Medical Issues:  No        Objective   Vital Signs:   /62   Pulse 66   Ht 157.5 cm (62\")   Wt 46.3 kg (102 lb)   BMI 18.66 kg/m²     Physical Exam  Neurological:      Mental Status: She is oriented to person, place, and time. Mental status is at baseline.      Coordination: Coordination is intact.      Gait: Gait is intact.   Psychiatric:         Behavior: Behavior is cooperative.        Result Review :     The following data was reviewed by: JENNY Ross on 07/06/2022:    Data reviewed: Previous note, medication history          Assessment and Plan    Problem List Items Addressed This Visit    None     Visit Diagnoses     ADHD (attention deficit hyperactivity disorder), inattentive type  (Chronic)   -  Primary    Mild episode of recurrent major depressive disorder (HCC)  (Chronic)       Generalized anxiety disorder  (Chronic)             PHQ-9 Score:   PHQ-9 Total Score: 8      Depression Screening:  Patient screened positive for depression based on a PHQ-9 score of 8 on 7/6/2022. Follow-up recommendations include: Prescribed antidepressant medication treatment and Suicide Risk Assessment performed.        Tobacco Cessation:  Patient has denied an present or past tobacco use. No tobacco cessation education necessary.       Impression/Plan:  -This is a follow-up appointment.  Patient presents today and reports she has been doing well overall.  She does endorse continued struggles at times with anxiety, but feels it is primarily circumstantial, and says she is happy with her medication regimen overall.  She endorses taking them on a daily basis, and denies any adverse effects associated with them.  -Maintain Adderall XR 15 mg daily.  -Maintain Prozac 40 mg daily.  -The patient is being prescribed a controlled substance as part of the treatment plan. The " patient/guardian has been educated of appropriate use of the medication(s), including risks and side effects such as insomnia, headache, exacerbation of tics, nervousness, irritability, overstimulation, tremor, dizziness, anorexia or change in appetite, nausea, dry mouth, constipation, diarrhea, weight loss, sexual dysfunction, psychotic episodes, seizures, palpitations, tachycardia, hypertension, rare activation (activation of hypomania, ellis, and/or suicidal ideations), cardiovascular adverse effects including sudden death (especially patients with pre-existing structural abnormalities often associated with a family history of cardiac disease), may slow normal growth in children, weight gain is reported but not expected, sedation is possible but activation is much more common, metabolic adversities, and overdose among others. Patient/guardian is also informed that the medication is to be used by the patient only, the medication is to be used only as directed, and the medication should not be combined with other substances unless directed by a Provider/Prescriber. The patient/guardian verbalized understanding and agreement with this in their own words, and wish to continue with current treatment plan.  Controlled substance agreement completed and filed in the patient's chart.  -Patient's LENNIE report reviewed and deemed appropriate.  UDS performed 10/28/2021.  -Schedule follow-up appointment for 2 months or as needed.  UDS at follow-up.      MEDS ORDERED DURING VISIT:  No orders of the defined types were placed in this encounter.        Follow Up   Return in about 2 months (around 9/6/2022), or if symptoms worsen or fail to improve, for Next scheduled follow up.  Patient was given instructions and counseling regarding her condition or for health maintenance advice. Please see specific information pulled into the AVS if appropriate.       TREATMENT PLAN/GOALS: Continue supportive psychotherapy efforts and  medications as indicated. Treatment and medication options discussed during today's visit. Patient acknowledged and verbally consented to continue with current treatment plan and was educated on the importance of compliance with treatment and follow-up appointments.    MEDICATION ISSUES:  Discussed medication options and treatment plan of prescribed medication as well as the risks, benefits, and side effects including potential falls, possible impaired driving and metabolic adversities among others. Patient is agreeable to call the office with any worsening of symptoms or onset of side effects. Patient is agreeable to call 911 or go to the nearest ER should he/she begin having SI/HI.          This document has been electronically signed by JENNY Desir, PMHNP-BC  July 7, 2022 07:47 EDT      Part of this note may be an electronic transcription/translation of spoken language to printed text using the Dragon Dictation System.

## 2022-08-04 DIAGNOSIS — F90.0 ADHD (ATTENTION DEFICIT HYPERACTIVITY DISORDER), INATTENTIVE TYPE: Chronic | ICD-10-CM

## 2022-08-04 RX ORDER — DEXTROAMPHETAMINE SACCHARATE, AMPHETAMINE ASPARTATE MONOHYDRATE, DEXTROAMPHETAMINE SULFATE AND AMPHETAMINE SULFATE 3.75; 3.75; 3.75; 3.75 MG/1; MG/1; MG/1; MG/1
15 CAPSULE, EXTENDED RELEASE ORAL EVERY MORNING
Qty: 30 CAPSULE | Refills: 0 | Status: SHIPPED | OUTPATIENT
Start: 2022-08-04 | End: 2022-09-02 | Stop reason: SDUPTHER

## 2022-08-04 NOTE — TELEPHONE ENCOUNTER
Rx Refill Note  Requested Prescriptions     Pending Prescriptions Disp Refills   • amphetamine-dextroamphetamine XR (Adderall XR) 15 MG 24 hr capsule 30 capsule 0     Sig: Take 1 capsule by mouth Every Morning      Last office visit with prescribing clinician: 7/6/2022      Next office visit with prescribing clinician: 9/6/2022            Rose Ellison MA  08/04/22, 11:16 EDT

## 2022-09-02 DIAGNOSIS — F41.1 GENERALIZED ANXIETY DISORDER: Chronic | ICD-10-CM

## 2022-09-02 DIAGNOSIS — F33.2 SEVERE EPISODE OF RECURRENT MAJOR DEPRESSIVE DISORDER, WITHOUT PSYCHOTIC FEATURES: Chronic | ICD-10-CM

## 2022-09-02 DIAGNOSIS — F90.0 ADHD (ATTENTION DEFICIT HYPERACTIVITY DISORDER), INATTENTIVE TYPE: Chronic | ICD-10-CM

## 2022-09-02 RX ORDER — FLUOXETINE HYDROCHLORIDE 40 MG/1
40 CAPSULE ORAL DAILY
Qty: 30 CAPSULE | Refills: 2 | Status: SHIPPED | OUTPATIENT
Start: 2022-09-02 | End: 2023-01-14 | Stop reason: SDUPTHER

## 2022-09-02 RX ORDER — DEXTROAMPHETAMINE SACCHARATE, AMPHETAMINE ASPARTATE MONOHYDRATE, DEXTROAMPHETAMINE SULFATE AND AMPHETAMINE SULFATE 3.75; 3.75; 3.75; 3.75 MG/1; MG/1; MG/1; MG/1
15 CAPSULE, EXTENDED RELEASE ORAL EVERY MORNING
Qty: 30 CAPSULE | Refills: 0 | Status: SHIPPED | OUTPATIENT
Start: 2022-09-02 | End: 2022-09-08 | Stop reason: SDUPTHER

## 2022-09-02 NOTE — TELEPHONE ENCOUNTER
Rx Refill Note  Requested Prescriptions     Pending Prescriptions Disp Refills   • amphetamine-dextroamphetamine XR (Adderall XR) 15 MG 24 hr capsule 30 capsule 0     Sig: Take 1 capsule by mouth Every Morning   • FLUoxetine (PROzac) 40 MG capsule 30 capsule 2     Sig: Take 1 capsule by mouth Daily.      Last office visit with prescribing clinician: 7/6/2022      Next office visit with prescribing clinician: 9/6/2022            Roosevelt Guajardo Rep  09/02/22, 11:35 EDT

## 2022-09-06 ENCOUNTER — OFFICE VISIT (OUTPATIENT)
Dept: PSYCHIATRY | Facility: CLINIC | Age: 32
End: 2022-09-06

## 2022-09-06 VITALS
DIASTOLIC BLOOD PRESSURE: 60 MMHG | BODY MASS INDEX: 18.77 KG/M2 | WEIGHT: 102 LBS | SYSTOLIC BLOOD PRESSURE: 84 MMHG | HEIGHT: 62 IN | HEART RATE: 94 BPM

## 2022-09-06 DIAGNOSIS — F90.0 ADHD (ATTENTION DEFICIT HYPERACTIVITY DISORDER), INATTENTIVE TYPE: Primary | Chronic | ICD-10-CM

## 2022-09-06 DIAGNOSIS — F41.1 GENERALIZED ANXIETY DISORDER: Chronic | ICD-10-CM

## 2022-09-06 DIAGNOSIS — F33.0 MILD EPISODE OF RECURRENT MAJOR DEPRESSIVE DISORDER: Chronic | ICD-10-CM

## 2022-09-06 PROCEDURE — 99214 OFFICE O/P EST MOD 30 MIN: CPT | Performed by: NURSE PRACTITIONER

## 2022-09-06 RX ORDER — PROPRANOLOL HYDROCHLORIDE 10 MG/1
10 TABLET ORAL 3 TIMES DAILY PRN
Qty: 90 TABLET | Refills: 2 | Status: SHIPPED | OUTPATIENT
Start: 2022-09-06 | End: 2023-02-22 | Stop reason: SDUPTHER

## 2022-09-06 NOTE — PROGRESS NOTES
"Chief Complaint  No chief complaint on file.    Subjective     {CC  Encounters  Notes :23}     Amie Sepulveda presents to [facility] for ***    History of Present Illness: ***    Current Medications:   Current Outpatient Medications   Medication Sig Dispense Refill   • amphetamine-dextroamphetamine XR (Adderall XR) 15 MG 24 hr capsule Take 1 capsule by mouth Every Morning 30 capsule 0   • FLUoxetine (PROzac) 40 MG capsule Take 1 capsule by mouth Daily. 30 capsule 2   • propranolol (INDERAL) 10 MG tablet Take 1 tablet by mouth 3 (Three) Times a Day As Needed (Anxiety). 90 tablet 2     No current facility-administered medications for this visit.       Mental Status Exam:   Hygiene:   {good/fair/poor:680222533}  Cooperation:  {Cooperation:911694371}  Eye Contact:  {Eye Contact:302598344}  Psychomotor Behavior:  {Psychomotor Behavior:19083}  Affect:  {Affect:004692201}  Mood: {Mood:18692}  Speech:  {Speech:027245032}  Thought Process:  {Thought Process:709547484}  Thought Content:  {Thought Content:789920111}  Suicidal:  {Suicidal:887780104}  Homicidal:  {Homidical:467509075}  Hallucinations:  {Hallucinations:839163778}  Delusion:  {Delusion:817843914}  Memory:  {Memory:778091516}  Orientation:  {Orientation:015871587}  Reliability:  {good/fair/poor:080108666}  Insight:  {good/fair/poor/none:853224834}  Judgement:  {good/fair/poor/impaired:604958439}  Impulse Control:  {good/fair/poor/impaired:583421064}  Physical/Medical Issues:  {No/Yes:920108701}       Objective   Vital Signs:   BP (!) 84/60   Pulse 94   Ht 157.5 cm (62\")   Wt 46.3 kg (102 lb)   BMI 18.66 kg/m²     Physical Exam   Result Review :{ Labs  Med Tab  Media :23}     {The following data was reviewed by (Optional):50304}  {Ambulatory Labs (Optional):96052}  {Data reviewed (Optional):39118:::1}          Assessment and Plan { Review (Popup)  Quality  BestPractice :23}   Problem List Items Addressed This " Visit    None     Visit Diagnoses     ADHD (attention deficit hyperactivity disorder), inattentive type  (Chronic)   -  Primary    Generalized anxiety disorder  (Chronic)       Relevant Medications    propranolol (INDERAL) 10 MG tablet    Mild episode of recurrent major depressive disorder (HCC)              PHQ-9 Score:   PHQ-9 Total Score: 9      Depression Screening:  Patient screened positive for depression based on a PHQ-9 score of 9 on 9/6/2022. Follow-up recommendations include: {depression follow-up plan:19340}.        Tobacco Cessation:  ***    Impression/Plan:  ***    MEDS ORDERED DURING VISIT:  New Medications Ordered This Visit   Medications   • propranolol (INDERAL) 10 MG tablet     Sig: Take 1 tablet by mouth 3 (Three) Times a Day As Needed (Anxiety).     Dispense:  90 tablet     Refill:  2       {Time Spent (Optional):94390}  Follow Up { Follow-up  Notes :23}  No follow-ups on file.  Patient was given instructions and counseling regarding her condition or for health maintenance advice. Please see specific information pulled into the AVS if appropriate.       TREATMENT PLAN/GOALS: Continue supportive psychotherapy efforts and medications as indicated. Treatment and medication options discussed during today's visit. Patient acknowledged and verbally consented to continue with current treatment plan and was educated on the importance of compliance with treatment and follow-up appointments.    MEDICATION ISSUES:  Discussed medication options and treatment plan of prescribed medication as well as the risks, benefits, and side effects including potential falls, possible impaired driving and metabolic adversities among others. Patient is agreeable to call the office with any worsening of symptoms or onset of side effects. Patient is agreeable to call 911 or go to the nearest ER should he/she begin having SI/HI.          This document has been electronically signed by JENNY Desir,  PMHNP-BC  September 6, 2022 14:20 EDT      Part of this note may be an electronic transcription/translation of spoken language to printed text using the Dragon Dictation System.

## 2022-09-08 RX ORDER — DEXTROAMPHETAMINE SACCHARATE, AMPHETAMINE ASPARTATE MONOHYDRATE, DEXTROAMPHETAMINE SULFATE AND AMPHETAMINE SULFATE 3.75; 3.75; 3.75; 3.75 MG/1; MG/1; MG/1; MG/1
15 CAPSULE, EXTENDED RELEASE ORAL EVERY MORNING
Qty: 30 CAPSULE | Refills: 0 | Status: SHIPPED | OUTPATIENT
Start: 2022-09-08 | End: 2022-11-22 | Stop reason: SDUPTHER

## 2022-09-08 NOTE — PROGRESS NOTES
"Chief Complaint  Anxiety, Depression, and ADHD    Subjective              Amie Sepulveda presents to [facility] for medication management of her anxiety, depression, ADHD symptoms.    History of Present Illness: Patient presents today for follow-up appointment after last being seen on 07/06/2022.  Patient reports today \"I think I am doing good overall\".  She reports she continues to take her medications, and especially feels her ADHD symptom burden continues to be well controlled.  However the patient says she is still struggling regularly with her anxiety.  She says she sometimes feels she is in a constant state of fight or flight mode.  She says she has not been struggling with any depression, and feels with regards to her mood and her ADHD symptoms that she has experienced \"a huge improvement from a year ago\".  Patient says she just does not understand why she continues to struggle with so much anxiety.  She endorses taking her medications regularly, and denies any issues or concerns with them.  She reports her sleep and her appetite \"are about the same\".  She says she continues to be up late studying for school, which sometimes causes her to be very tired the next day.  She will be done with school in approximately 6 months, and is happy for that.  Patient denies any SI/HI, A/V hallucinations.      Current Medications:   Current Outpatient Medications   Medication Sig Dispense Refill   • amphetamine-dextroamphetamine XR (Adderall XR) 15 MG 24 hr capsule Take 1 capsule by mouth Every Morning 30 capsule 0   • FLUoxetine (PROzac) 40 MG capsule Take 1 capsule by mouth Daily. 30 capsule 2   • propranolol (INDERAL) 10 MG tablet Take 1 tablet by mouth 3 (Three) Times a Day As Needed for Anxiety 90 tablet 2     No current facility-administered medications for this visit.       Mental Status Exam:   Hygiene:   good  Cooperation:  Cooperative  Eye Contact:  Good  Psychomotor " "Behavior:  Appropriate  Affect:  Appropriate  Mood: anxious  Speech:  Normal  Thought Process:  Goal directed  Thought Content:  Mood congruent  Suicidal:  None  Homicidal:  None  Hallucinations:  None  Delusion:  None  Memory:  Intact  Orientation:  Person, Place, Time and Situation  Reliability:  good  Insight:  Good  Judgement:  Good  Impulse Control:  Good  Physical/Medical Issues:  No        Objective   Vital Signs:   BP (!) 84/60   Pulse 94   Ht 157.5 cm (62\")   Wt 46.3 kg (102 lb)   BMI 18.66 kg/m²     Physical Exam  Neurological:      Mental Status: She is oriented to person, place, and time. Mental status is at baseline.      Coordination: Coordination is intact.      Gait: Gait is intact.   Psychiatric:         Behavior: Behavior is cooperative.        Result Review :     The following data was reviewed by: JENNY Ross on 09/06/2022:    Data reviewed: Previous note, medication history          Assessment and Plan    Problem List Items Addressed This Visit    None     Visit Diagnoses     ADHD (attention deficit hyperactivity disorder), inattentive type  (Chronic)   -  Primary    Relevant Medications    amphetamine-dextroamphetamine XR (Adderall XR) 15 MG 24 hr capsule    Generalized anxiety disorder  (Chronic)       Relevant Medications    propranolol (INDERAL) 10 MG tablet    amphetamine-dextroamphetamine XR (Adderall XR) 15 MG 24 hr capsule    Mild episode of recurrent major depressive disorder (HCC)  (Chronic)       Relevant Medications    amphetamine-dextroamphetamine XR (Adderall XR) 15 MG 24 hr capsule          PHQ-9 Score:   PHQ-9 Total Score: 9      Depression Screening:  Patient screened positive for depression based on a PHQ-9 score of 9 on 9/6/2022. Follow-up recommendations include: Prescribed antidepressant medication treatment and Suicide Risk Assessment performed.        Tobacco Cessation:  Patient has denied an present or past tobacco use. No tobacco cessation education " necessary.       Impression/Plan:  -This is a follow-up appointment.  Patient presents today and reports while she feels she has been doing well overall, she continues to struggle heavily with her anxiety.  She feels her current medication regimen has worked significantly for her ADHD symptoms, as well as her mood.  She denies any concerns or issues with either of those, but expresses continued concern regarding her anxiety.  Patient feels starting Adderall in treating her ADHD did help some with her anxiety, but not very significantly.  Discussed adding a medication to help with her anxiety, the patient reports she would be open to this.  Also discussed possibility of trying a different medication than Adderall in the future, and the patient reports she may be open to that as well.  -Start propranolol 10 mg 3 times daily as needed.  Patient has a relatively low baseline blood pressure.  Advised her if she wants to start with only 5 mg that is acceptable as well.  Advised patient to monitor herself for signs and symptoms of hypotension and/or bradycardia.  We discussed risks versus benefits, as well as potential adverse effects associated with adding this medication to patient's daily regimen. Patient is in agreement with this plan and was educated on the importance of compliance with all aspects of treatment and follow-up appointments. Patient is agreeable to call the office with any worsening of symptoms or onset of side effects.  -Maintain Adderall XR 15 mg daily.  -Maintain Prozac 40 mg daily.  -The patient is being prescribed a controlled substance as part of the treatment plan. The patient/guardian has been educated of appropriate use of the medication(s), including risks and side effects such as insomnia, headache, exacerbation of tics, nervousness, irritability, overstimulation, tremor, dizziness, anorexia or change in appetite, nausea, dry mouth, constipation, diarrhea, weight loss, sexual dysfunction,  psychotic episodes, seizures, palpitations, tachycardia, hypertension, rare activation (activation of hypomania, ellis, and/or suicidal ideations), cardiovascular adverse effects including sudden death (especially patients with pre-existing structural abnormalities often associated with a family history of cardiac disease), may slow normal growth in children, weight gain is reported but not expected, sedation is possible but activation is much more common, metabolic adversities, and overdose among others. Patient/guardian is also informed that the medication is to be used by the patient only, the medication is to be used only as directed, and the medication should not be combined with other substances unless directed by a Provider/Prescriber. The patient/guardian verbalized understanding and agreement with this in their own words, and wish to continue with current treatment plan.  Controlled substance agreement completed and filed in the patient's chart.  -Patient's LENNIE report reviewed and deemed appropriate.  UDS performed 10/28/2021.  -Reviewed available lab work.  -Schedule follow-up appointment for 6 weeks or as needed.  UDS at follow-up.      MEDS ORDERED DURING VISIT:  New Medications Ordered This Visit   Medications   • propranolol (INDERAL) 10 MG tablet     Sig: Take 1 tablet by mouth 3 (Three) Times a Day As Needed for Anxiety     Dispense:  90 tablet     Refill:  2   • amphetamine-dextroamphetamine XR (Adderall XR) 15 MG 24 hr capsule     Sig: Take 1 capsule by mouth Every Morning     Dispense:  30 capsule     Refill:  0         Follow Up   Return in about 6 weeks (around 10/18/2022), or if symptoms worsen or fail to improve, for Next scheduled follow up.  Patient was given instructions and counseling regarding her condition or for health maintenance advice. Please see specific information pulled into the AVS if appropriate.     Answers for HPI/ROS submitted by the patient on 9/6/2022  Please describe  your symptoms.: Medicine check for depression, anxiety  adhd.  Have you had these symptoms before?: Yes  How long have you been having these symptoms?: Greater than 2 weeks  Please list any medications you are currently taking for this condition.: Fluoxetine, Adderall  What is the primary reason for your visit?: Other    TREATMENT PLAN/GOALS: Continue supportive psychotherapy efforts and medications as indicated. Treatment and medication options discussed during today's visit. Patient acknowledged and verbally consented to continue with current treatment plan and was educated on the importance of compliance with treatment and follow-up appointments.    MEDICATION ISSUES:  Discussed medication options and treatment plan of prescribed medication as well as the risks, benefits, and side effects including potential falls, possible impaired driving and metabolic adversities among others. Patient is agreeable to call the office with any worsening of symptoms or onset of side effects. Patient is agreeable to call 911 or go to the nearest ER should he/she begin having SI/HI.          This document has been electronically signed by JENNY Desir, PMHNP-BC  September 8, 2022 08:53 EDT      Part of this note may be an electronic transcription/translation of spoken language to printed text using the Dragon Dictation System.

## 2022-09-13 ENCOUNTER — TELEPHONE (OUTPATIENT)
Dept: NEUROLOGY | Facility: CLINIC | Age: 32
End: 2022-09-13

## 2022-09-13 NOTE — TELEPHONE ENCOUNTER
Returned call to patient and left vm that we would be glad to have her as a patient and just give us a call back.

## 2022-09-13 NOTE — TELEPHONE ENCOUNTER
Provider: JENNY LIU  Caller: YASMEEN SANDOVAL  Relationship to Patient: SELF  Phone Number: [phone]  Reason for Call: PT CALLING STATING THAT SHE IS STARTING A NEW JOB NEXT WEEK ON THE 9-20-22 AT THE [facility] AND WANTS TO KNOW IF IT WILL BE OK TO TRANSFER HER CARE TO THE [facility] NEUROLGY OFFICE?

## 2022-09-13 NOTE — TELEPHONE ENCOUNTER
Provider: [facility]  Caller: YASMEEN SANDOVAL  Relationship to Patient: SELF  Phone Number: [phone]  Reason for Call: PT CALLING STATING THAT SHE IS STARTING A NEW JOB NEXT WEEK ON THE 9-20-22 AT THE [facility] AND WANTS TO KNOW IF IT WILL BE OK TO TRANSFER HER CARE TO THE [facility] NEUROLGY OFFICE?..PT IS A PT OF JENNY COHN, MESSAGE HAS BEEN SENT TO JENNY COHN

## 2022-10-18 ENCOUNTER — TELEMEDICINE (OUTPATIENT)
Dept: PSYCHIATRY | Facility: CLINIC | Age: 32
End: 2022-10-18

## 2022-10-18 DIAGNOSIS — F41.1 GENERALIZED ANXIETY DISORDER: Chronic | ICD-10-CM

## 2022-10-18 DIAGNOSIS — F90.0 ADHD (ATTENTION DEFICIT HYPERACTIVITY DISORDER), INATTENTIVE TYPE: Primary | Chronic | ICD-10-CM

## 2022-10-18 DIAGNOSIS — F33.0 MILD EPISODE OF RECURRENT MAJOR DEPRESSIVE DISORDER: Chronic | ICD-10-CM

## 2022-10-18 PROCEDURE — 99214 OFFICE O/P EST MOD 30 MIN: CPT | Performed by: NURSE PRACTITIONER

## 2022-10-18 NOTE — PROGRESS NOTES
"This provider is located at [facility] ,[address], [address] [place] in [place], [place],using a secure [email or web address] Video Visit through [employer]. Patient is being seen remotely via telehealth at their home address in [place], and stated they are in a secure environment for this session. The patient's condition being diagnosed/treated is appropriate for telemedicine. The provider identified herself as well as her credentials.   The patient, and/or patients guardian, consent to be seen remotely, and when consent is given they understand that the consent allows for patient identifiable information to be sent to a third party as needed.   They may refuse to be seen remotely at any time. The electronic data is encrypted and password protected, and the patient and/or guardian has been advised of the potential risks to privacy not withstanding such measures.    Chief Complaint  ADHD, Anxiety, and Depression       Subjective          Amie Sepulveda presents today via [employer] Video through [employer] for medication management of her ADHD, anxiety, depression.    History of Present Illness: Patient presents today via [employer] video for follow-up appointment after last being seen on 09/06/2022.  At her last point, the patient was started on propranolol to help manage her anxiety symptoms.  Patient reports today \"I am doing okay\".  Patient says she feels she has been doing better since her last appointment.  She says propranolol has been very helpful for her anxiety.  She says she still experiences difficulties with it at times, but that the medication has helped significantly.  She reports she does take it 3 times a day very frequently, but occasionally only twice daily.  She is also continue to take her Adderall and Prozac, and feels they have continued to be helpful for her ADHD symptom burden, as well as anxiety and mood.  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  She will be done with school in March.  She says working seems to have also been helpful for her mood and anxiety.  Patient denies any new or significant issue with sleep or appetite.  Patient denies any SI/HI, A/V hallucinations.      The following portions of the patient's history were reviewed and updated as appropriate: allergies, current medications, past family history, past medical history, past social history, past surgical history and problem list.      Current Medications:   Current Outpatient Medications   Medication Sig Dispense Refill   • amphetamine-dextroamphetamine XR (Adderall XR) 15 MG 24 hr capsule Take 1 capsule by mouth Every Morning 30 capsule 0   • FLUoxetine (PROzac) 40 MG capsule Take 1 capsule by mouth Daily. 30 capsule 2   • propranolol (INDERAL) 10 MG tablet Take 1 tablet by mouth 3 (Three) Times a Day As Needed for Anxiety 90 tablet 2     No current facility-administered medications for this visit.       Mental Status Exam:   Hygiene:   MyChart video  Cooperation:  Cooperative  Eye Contact:  Good  Psychomotor Behavior:  Appropriate  Affect:  Appropriate  Mood: normal  Speech:  Normal  Thought Process:  Goal directed  Thought Content:  Mood congruent  Suicidal:  None  Homicidal:  None  Hallucinations:  None  Delusion:  None  Memory:  Intact  Orientation:  Person, Place, Time and Situation  Reliability:  good  Insight:  Good  Judgement:  Good  Impulse Control:  Good  Physical/Medical Issues:  No      Objective   Vital Signs:   There were no vitals taken for this visit.    Physical Exam  Neurological:      Mental Status: She is oriented to person, place, and time. Mental status is at baseline.   Psychiatric:         Behavior: Behavior is cooperative.        Result Review :     The following data was reviewed by: JENNY Ross on 10/18/2022:    Data reviewed: Previous note, medication history          Assessment and Plan    Diagnoses  and all orders for this visit:    1. ADHD (attention deficit hyperactivity disorder), inattentive type (Primary)    2. Generalized anxiety disorder    3. Mild episode of recurrent major depressive disorder (HCC)       Tobacco Cessation:  Patient has denied an present or past tobacco use. No tobacco cessation education necessary.       Impression/Plan:  -This is a follow-up appointment.  Patient presents today and reports she has been doing well overall since her last appointment.  She endorses taking her medication as prescribed, and denies any adverse effects associated with them.  She feels her current medication regimen has continued to be helpful and sufficient for her symptom burden.  She especially reports the addition of propranolol at her last appointment has helped with her anxiety symptoms.  She is happy with how well she is doing.  -Maintain Adderall XR 15 mg daily.  -Maintain Prozac 40 mg daily.  -Maintain propranolol 10 mg 3 times daily as needed.  -The patient is being prescribed a controlled substance as part of the treatment plan. The patient/guardian has been educated of appropriate use of the medication(s), including risks and side effects such as insomnia, headache, exacerbation of tics, nervousness, irritability, overstimulation, tremor, dizziness, anorexia or change in appetite, nausea, dry mouth, constipation, diarrhea, weight loss, sexual dysfunction, psychotic episodes, seizures, palpitations, tachycardia, hypertension, rare activation (activation of hypomania, ellis, and/or suicidal ideations), cardiovascular adverse effects including sudden death (especially patients with pre-existing structural abnormalities often associated with a family history of cardiac disease), may slow normal growth in children, weight gain is reported but not expected, sedation is possible but activation is much more common, metabolic adversities, and overdose among others. Patient/guardian is also informed that  the medication is to be used by the patient only, the medication is to be used only as directed, and the medication should not be combined with other substances unless directed by a Provider/Prescriber. The patient/guardian verbalized understanding and agreement with this in their own words, and wish to continue with current treatment plan.  Controlled substance agreement completed and filed in the patient's chart.  -Patient's LENNIE report reviewed and deemed appropriate.  Patient counseled on use of controlled substances.  UDS performed 10/28/2021.  -Reviewed available lab work.  -Schedule follow-up appointment for 8 weeks or as needed.  UDS at follow-up.    MEDS ORDERED DURING VISIT:  No orders of the defined types were placed in this encounter.        Follow Up { Follow-up  Notes :23}  Return in 8 weeks (on 12/14/2022), or if symptoms worsen or fail to improve, for Next scheduled follow up, UDS.  Patient was given instructions and counseling regarding her condition or for health maintenance advice. Please see specific information pulled into the AVS if appropriate.       TREATMENT PLAN/GOALS: Continue supportive psychotherapy efforts and medications as indicated. Treatment and medication options discussed during today's visit. Patient acknowledged and verbally consented to continue with current treatment plan and was educated on the importance of compliance with treatment and follow-up appointments.    MEDICATION ISSUES:  Discussed medication options and treatment plan of prescribed medication as well as the risks, benefits, and side effects including potential falls, possible impaired driving and metabolic adversities among others. Patient is agreeable to call the office with any worsening of symptoms or onset of side effects. Patient is agreeable to call 911 or go to the nearest ER should he/she begin having SI/HI.          This document has been electronically signed by JENNY Desir,  PMHNP-BC  October 18, 2022 14:13 EDT      Part of this note may be an electronic transcription/translation of spoken language to printed text using the Dragon Dictation System.

## 2022-11-09 ENCOUNTER — TELEPHONE (OUTPATIENT)
Dept: PSYCHIATRY | Facility: CLINIC | Age: 32
End: 2022-11-09

## 2022-11-09 NOTE — TELEPHONE ENCOUNTER
Amie called in to say she is having numbness from top of arm to her fingertips. She asked if her meds could have anything to do with this? I put patient on hold, asked Estrada and he advised patient to go to [facility] or ER for evaluation. Thank you.

## 2022-11-22 DIAGNOSIS — F90.0 ADHD (ATTENTION DEFICIT HYPERACTIVITY DISORDER), INATTENTIVE TYPE: Chronic | ICD-10-CM

## 2022-11-22 RX ORDER — DEXTROAMPHETAMINE SACCHARATE, AMPHETAMINE ASPARTATE MONOHYDRATE, DEXTROAMPHETAMINE SULFATE AND AMPHETAMINE SULFATE 3.75; 3.75; 3.75; 3.75 MG/1; MG/1; MG/1; MG/1
15 CAPSULE, EXTENDED RELEASE ORAL EVERY MORNING
Qty: 30 CAPSULE | Refills: 0 | Status: SHIPPED | OUTPATIENT
Start: 2022-11-22 | End: 2023-01-14 | Stop reason: SDUPTHER

## 2022-11-22 NOTE — TELEPHONE ENCOUNTER
Chart review completed and medication refill approved.   Patient's LENNIE report reviewed and deemed appropriate.  Patient counseled on use of controlled substances.

## 2022-12-12 ENCOUNTER — OFFICE VISIT (OUTPATIENT)
Dept: NEUROLOGY | Facility: CLINIC | Age: 32
End: 2022-12-12

## 2022-12-12 VITALS
WEIGHT: 101.8 LBS | DIASTOLIC BLOOD PRESSURE: 58 MMHG | BODY MASS INDEX: 18.74 KG/M2 | OXYGEN SATURATION: 98 % | SYSTOLIC BLOOD PRESSURE: 98 MMHG | HEART RATE: 76 BPM | TEMPERATURE: 98 F | HEIGHT: 62 IN

## 2022-12-12 DIAGNOSIS — G43.109 MIGRAINE WITH AURA AND WITHOUT STATUS MIGRAINOSUS, NOT INTRACTABLE: Primary | ICD-10-CM

## 2022-12-12 PROCEDURE — 99214 OFFICE O/P EST MOD 30 MIN: CPT | Performed by: NURSE PRACTITIONER

## 2022-12-12 NOTE — PROGRESS NOTES
Headache New Office Visit      Encounter Date: 2022   Patient Name: Amie Sepulveda  : 1990   MRN: [id number]   PCP: Swati Alba DO  Chief Complaint:    Chief Complaint   Patient presents with   • Migraine       History of Present Illness: Amie Sepulveda is a 32 y.o. female who is here today for evaluation of headaches.      Headache Symptoms:   Days per month: 8-12 migraines a month. Will have daily mild headache  Location: Occiput , Right Eye and Left Eye, mild headaches are occipital  Quality: Sharp and Throbbing        Duration: 2 hours  Severity: 7/10.  Triggers: dehydration, cycles, disrupted sleep  Associated Symptoms: Nausea, Photophobia, Phonophobia, Dizziness and  Vision changes difficult to read on white paper  Aura: loss of central vision, gold mccarty on periphery, 20 minutes later pain will start    Ocular Migraines  Will have 1-2 ocular migraines a month. Will have same aura and vision loss with no pain, duration 30 min     Hemiplegic Migraine  2 years ago 18 while pregnant had episode of migraine seen in ED. Had parethesia bilat hand and arms, right sided weakness, confusion for 30 minutes memory loss, felt like she couldn't speak. Seen in ED had MRI and EKG. Treated with IVF. Paresthesia lasted all day. Memory loss also returned. Treated with mag and riboflavin      Abortives: imitrex  Preventives: beta blocker    Does not feel the adderall triggers her headaches.                                                                             PH  Onset as a teenager. Increased frequency in last 6 years.  Could be related to stress with 4 kids and she works as a behavior therapist w autism.  2 of her children has autism as well. Full -time student for master's    MRI Brain Without Contrast (2020 15:55)-nml  PMH: ADD, covid infection 2021.  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 Often with aura. No relief with previous medication.   • Vision loss     With migraines       Past Surgical History:   Past Surgical History:   Procedure Laterality Date   • APPENDECTOMY     • DILATATION AND CURETTAGE     • ULNAR NERVE TRANSPOSITION     • WISDOM TOOTH EXTRACTION         Family History:   Family History   Problem Relation Age of Onset   • Lung cancer Paternal Grandfather         smoker   • Hypertension Father    • Diabetes Maternal Grandfather    • Kidney disease Paternal Grandmother    • Heart disease Paternal Grandmother    • Stroke Paternal Grandmother    • Dementia Maternal Grandmother        Social History:   Social History     Socioeconomic History   • Marital status:    Tobacco Use   • Smoking status: Never   • Smokeless tobacco: Never   Vaping Use   • Vaping Use: Never used   Substance and Sexual Activity   • Alcohol use: Not Currently     Alcohol/week: 2.0 standard drinks     Types: 2 Glasses of wine per week     Comment: Per month   • Drug use: No   • Sexual activity: Yes     Partners: Male     Birth control/protection: Vasectomy       Medications:     Current Outpatient Medications:   •  amphetamine-dextroamphetamine XR (Adderall XR) 15 MG 24 hr capsule, Take 1 capsule by mouth Every Morning, Disp: 30 capsule, Rfl: 0  •  FLUoxetine (PROzac) 40 MG capsule, Take 1 capsule by mouth Daily., Disp: 30 capsule, Rfl: 2  •  propranolol (INDERAL) 10 MG tablet, Take 1 tablet by mouth 3 (Three) Times a Day As Needed for Anxiety, Disp: 90 tablet, Rfl: 2    Allergies:   Allergies   Allergen Reactions   • Penicillins Rash       PHQ-9 Total Score:     MARIA FERNANDAADI Fall Risk Assessment has not been completed.    Objective     Physical Exam:   Physical Exam  Eyes:      Pupils: Pupils are equal, round, and reactive to light.   Neurological:      Mental Status: She is oriented to person, place, and  time.      Coordination: Finger-Nose-Finger Test, Heel to Shin Test and Romberg Test normal.      Gait: Gait is intact. Tandem walk normal.      Deep Tendon Reflexes:      Reflex Scores:       Tricep reflexes are 2+ on the right side and 2+ on the left side.       Bicep reflexes are 2+ on the right side and 2+ on the left side.       Brachioradialis reflexes are 2+ on the right side and 2+ on the left side.       Patellar reflexes are 2+ on the right side and 2+ on the left side.       Achilles reflexes are 2+ on the right side and 2+ on the left side.  Psychiatric:         Speech: Speech normal.         Neurologic Exam     Mental Status   Oriented to person, place, and time.   Follows 3 step commands.   Attention: normal. Concentration: normal.   Speech: speech is normal   Level of consciousness: alert  Knowledge: consistent with education.   Normal comprehension.     Cranial Nerves     CN III, IV, VI   Pupils are equal, round, and reactive to light.  Right pupil: Accommodation: intact.   Left pupil: Accommodation: intact.   CN III: no CN III palsy  CN VI: no CN VI palsy  Nystagmus: none   Diplopia: none  Upgaze: normal  Downgaze: normal  Conjugate gaze: present    CN VII   Facial expression full, symmetric.     CN VIII   Hearing: intact    CN XII   CN XII normal.     Motor Exam   Muscle bulk: normal  Overall muscle tone: normal    Strength   Right biceps: 5/5  Left biceps: 5/5  Right triceps: 5/5  Left triceps: 5/5  Right interossei: 5/5  Left interossei: 5/5  Right quadriceps: 5/5  Left quadriceps: 5/5  Right anterior tibial: 5/5  Left anterior tibial: 5/5  Right posterior tibial: 5/5  Left posterior tibial: 5/5    Sensory Exam   Light touch normal.     Gait, Coordination, and Reflexes     Gait  Gait: normal    Coordination   Romberg: negative  Finger to nose coordination: normal  Heel to shin coordination: normal  Tandem walking coordination: normal    Tremor   Resting tremor: absent  Action tremor:  "absent    Reflexes   Right brachioradialis: 2+  Left brachioradialis: 2+  Right biceps: 2+  Left biceps: 2+  Right triceps: 2+  Left triceps: 2+  Right patellar: 2+  Left patellar: 2+  Right achilles: 2+  Left achilles: 2+  Right : 2+  Left : 2+       Vital Signs:   Vitals:    12/12/22 1058   BP: 98/58   Pulse: 76   Temp: 98 °F (36.7 °C)   SpO2: 98%   Weight: 46.2 kg (101 lb 12.8 oz)   Height: 157.5 cm (62\")     Body mass index is 18.62 kg/m².       Assessment / Plan      Assessment/Plan:   Diagnoses and all orders for this visit:    1. Migraine with aura and without status migrainosus, not intractable (Primary)  Comments:  Cont betablocker. Samples of Nurtec as abortive. Request Qulipta. Elavil contraindicated with fluoxetine.        Patient Education:   I have discussed with the patient today the causes and overview of headaches. We discussed the different types of headaches to include tension-type headaches, Migraine headaches and Cluster headaches. We also discussed when headaches could or would be of a more serious condition such as brain infection, inflammation or bleeding within or around the brain. When to seek immediate medical attention or call 911.     Reviewed medications, potential side effects and signs and symptoms to report. Discussed risk versus benefits of treatment plan with patient and/or family-including medications, labs and radiology that may be ordered. Addressed questions and concerns during visit. Patient and/or family verbalized understanding and agree with plan. Instructed to call the office with any questions and report to ER with any life-threatening symptoms.     Follow Up:   Return in about 3 months (around 3/12/2023).    During this visit the following were done:  Labs Reviewed []    Labs Ordered []    Radiology Reports Reviewed []    Radiology Ordered []    PCP Records Reviewed []    Referring Provider Records Reviewed []    ER Records Reviewed []    Hospital Records " Reviewed []    History Obtained From Family []    Radiology Images Reviewed []    Other Reviewed [x]    Records Requested []      Francesca Frank, LILA, APRN

## 2022-12-14 ENCOUNTER — PATIENT ROUNDING (BHMG ONLY) (OUTPATIENT)
Dept: NEUROLOGY | Facility: CLINIC | Age: 32
End: 2022-12-14

## 2022-12-14 NOTE — PROGRESS NOTES
December 14, 2022    Hello, may I speak with Amie Sepulveda?    My name is rosita      I am  with [facility]  [address]  [facility] [address] [phone].    Before we get started may I verify your date of birth? 1990    I am calling to officially welcome you to our practice and ask about your recent visit. Is this a good time to talk?  Patient rounding sent through CMP[employer]LY.

## 2022-12-15 ENCOUNTER — SPECIALTY PHARMACY (OUTPATIENT)
Dept: ONCOLOGY | Facility: HOSPITAL | Age: 32
End: 2022-12-15

## 2022-12-15 ENCOUNTER — TELEPHONE (OUTPATIENT)
Dept: NEUROLOGY | Facility: CLINIC | Age: 32
End: 2022-12-15

## 2022-12-15 ENCOUNTER — DOCUMENTATION (OUTPATIENT)
Dept: ONCOLOGY | Facility: HOSPITAL | Age: 32
End: 2022-12-15

## 2022-12-15 PROBLEM — G43.109 MIGRAINE WITH AURA AND WITHOUT STATUS MIGRAINOSUS, NOT INTRACTABLE: Status: ACTIVE | Noted: 2022-12-15

## 2022-12-15 RX ORDER — RIMEGEPANT SULFATE 75 MG/75MG
75 TABLET, ORALLY DISINTEGRATING ORAL DAILY PRN
Qty: 16 TABLET | Refills: 11 | Status: SHIPPED | OUTPATIENT
Start: 2022-12-15

## 2022-12-15 NOTE — TELEPHONE ENCOUNTER
Caller: Amie Sepulveda    Relationship: Self    Best call back number: [phone]    What is the best time to reach you: TODAY BETWEEN 1-3PM. CAN LEAVE A MESSAGEW    Who are you requesting to speak with (clinical staff, provider,  specific staff member): SUZANNA STRINGER    What was the call regarding: PATIENT WOULD LIKE A CALL BACK RE:QULIPTA  MEDICATION APPROVAL THROUGH INSURANCE DISCUSSED ON MONDAYS VISIT (12/12/12)

## 2022-12-15 NOTE — PROGRESS NOTES
Specialty Pharmacy Patient Management Program  Neurology Initial Assessment     Amie Sepulveda is a 32 y.o. female with migraines seen by a Neurology provider and enrolled in the Neurology Patient Management program offered by [facility].  An initial outreach was conducted, including assessment of therapy appropriateness and specialty medication education for Qulipta and Nurtec prn. The patient was introduced to services offered by [facility], including: regular assessments, refill coordination, curbside pick-up or mail order delivery options, prior authorization maintenance, and financial assistance programs as applicable. The patient was also provided with contact information for the pharmacy team.     Insurance Coverage & Financial Support  [employer]    Relevant Past Medical History and Comorbidities  Relevant medical history and concomitant health conditions were discussed with the patient. The patient's chart has been reviewed for relevant past medical history and comorbid health conditions and updated as necessary.   Past Medical History:   Diagnosis Date   • ADHD    • Anxiety    • Depression    • Headache, tension-type    • Migraine     2-3 migraines/week. Often with aura. No relief with previous medication.   • Vision loss     With migraines     Social History     Socioeconomic History   • Marital status:    Tobacco Use   • Smoking status: Never   • Smokeless tobacco: Never   Vaping Use   • Vaping Use: Never used   Substance and Sexual Activity   • Alcohol use: Not Currently     Alcohol/week: 2.0 standard drinks     Types: 2 Glasses of wine per week     Comment: Per month   • Drug use: No   • Sexual activity: Yes     Partners: Male     Birth control/protection: Vasectomy       Problem list reviewed by Alicja Issa, PharmD on 12/15/2022 at  3:01 PM  Problem list reviewed by Alicja Issa, PharmD on 12/15/2022 at  3:08 PM    Allergies  Known  allergies and reactions were discussed with the patient. The patient's chart has been reviewed for  allergy information and updated as necessary.   Penicillins    Allergies reviewed by Alicja Issa PharmD on 12/15/2022 at  3:00 PM    Current Medication List  This medication list has been reviewed with the patient and evaluated for any interactions or necessary modifications/recommendations, and updated to include all prescription medications, OTC medications, and supplements the patient is currently taking.  This list reflects what is contained in the patient's profile, which has also been marked as reviewed to communicate to other providers it is the most up to date version of the patient's current medication therapy.     Current Outpatient Medications:   •  amphetamine-dextroamphetamine XR (Adderall XR) 15 MG 24 hr capsule, Take 1 capsule by mouth Every Morning, Disp: 30 capsule, Rfl: 0  •  Atogepant 60 MG tablet, Take 1 tablet by mouth Daily., Disp: 30 tablet, Rfl: 11  •  FLUoxetine (PROzac) 40 MG capsule, Take 1 capsule by mouth Daily., Disp: 30 capsule, Rfl: 2  •  propranolol (INDERAL) 10 MG tablet, Take 1 tablet by mouth 3 (Three) Times a Day As Needed for Anxiety, Disp: 90 tablet, Rfl: 2  •  Rimegepant Sulfate (Nurtec) 75 MG tablet dispersible tablet, Take 1 tablet by mouth Daily As Needed (migraine)., Disp: 16 tablet, Rfl: 11    Medicines reviewed by Alicja Issa PharmD on 12/15/2022 at  3:00 PM    Drug Interactions  None identified     Recommended Medications Assessment    None      Relevant Laboratory Values        Initial Education Provided for Specialty Medication  The patient has been provided with the following education and any applicable administration techniques (i.e. self-injection) have been demonstrated for the therapies indicated. All questions and concerns have been addressed prior to the patient receiving the medication, and the patient has verbalized understanding of the  education and any materials provided.  Additional patient education shall be provided and documented upon request by the patient, provider or payer.      Atogepant (Qulipta)           Medication Expectations   Why am I taking this medication? You are taking this medication for migraine prophylaxis.   What should I expect while on this medication? You should expect to a decrease in the frequency and severity of your migraines.   How does the medication work? Qulipta is a monoclonal antibody that binds to calcitonin gene-related peptide (CGRP) and blocks its binding to the receptor decreasing the severity of migraines.   How long will I be on this medication for? The amount of time you will be on this medication will be determined by your doctor and your response to the medication.    How do I take this medication? Take as directed on your prescription label.   Take one tablet daily with or without food.   What are some possible side effects? Potential side effects including, but not limited to nausea, constipation and fatigue. Pt verbalized understanding.   What happens if I miss a dose? If you miss a dose of this medicine, take it as soon as possible. However, if it is almost time for your next dose, skip the missed dose and go back to your regular dosing schedule. Do not double doses.                  Medication Safety   What are things I should warn my doctor immediately about? Hypersensitivity reactions, such as trouble breathing or swallowing.   What are things that I should be cautious of? Be cautious driving until you know how this drug will affect you.   What are some medications that can interact with this one? Ketoconazole, Itraconazole, cyclosporin, clarithromycin, rifampin, carbamazepine, phenytion, Bryant's Wort, efavirenz, and etravine.            Medication Storage/Handling   How should I handle this medication? Keep this medication our of reach of pets/children in original container.   How does this  medication need to be stored? Store at room temperature away from heat/cold, sunlight or moisture.   How should I dispose of this medication? There should not be a need to dispose of this medication unless your provider decides to change the dose or therapy. If that is the case, take to your local police station for proper disposal. Some pharmacies also have take-back bins for medication drop-off.             Resources/Support   How can I remind myself to take this medication? You can download reminder apps to help you manage your refills. You may also set an alarm on your phone to remind you. The pharmacy carries pill boxes that you can place next to an area you pass everyday (such as where you place your car keys or where you charge your phone)   Is financial support available?  Yes, [employer] can provide co-pay cards if you have commercial insurance or patient assistance if you have Medicare or no insurance.    Which vaccines are recommended for me? Talk to your doctor about these vaccines: Flu, Coronavirus (COVID-19), Pneumococcal (pneumonia), Tdap, Hepatitis B, Zoster (shingles)       Nurtec (rimegepant)  Medication Expectations   Why am I taking this medication? You are taking this medication for migraine prophylaxis or to treat an acute migraine.   What should I expect while on this medication? You should expect to see a decrease in the frequency and severity of your migraines.   How does the medication work? Nurtec is a monoclonal antibody that binds to calcitonin gene-related peptide (CGRP) and blocks its binding to the receptor decreasing the severity of migraines.   How long will I be on this medication for? The amount of time you will be on this medication will be determined by your doctor and your response to the medication.    How do I take this medication? Take as directed on your prescription label.   What are some possible side effects? Potential side effects including, but not limited to nausea. Pt  verbalized understanding.   What happens if I miss a dose? Take the missed dose as soon as possible, and resume the every other day timed from the last dose..     Medication Safety   What are things I should warn my doctor immediately about? Hypersensitivity reactions - trouble breathing or swallowing.   What are things that I should be cautious of? Hypersensitivity reactions (eg, dyspnea, rash), including delayed serious reactions, have occurred; discontinue use if suspected    What are some medications that can interact with this one? Avoid concomitant administration of Nurtec ODT with strong inhibitors of CY, strong or moderate inducers of CYP3A or inhibitors of P-gp or BCRP. Avoid another dose of Nurtec ODT within 48 hours when it is administered with moderate inhibitors of CY.  Ask your pharmacist or health care provider before starting new medications     Medication Storage/Handling   How should I handle this medication? Keep this medication out of reach of pets/children in original container. Ensure hands are dry before opening blister pack.   How does this medication need to be stored? Store at room temperature away from heat/cold, sunlight or moisture   How should I dispose of this medication? There should not be a need to dispose of this medication unless your provider decides to change the dose or therapy. If that is the case, take to your local police station for proper disposal. Some pharmacies also have take-back bins for medication drop-off.      Resources/Support   How can I remind myself to take this medication? You can download reminder apps to help you manage your refills. You may also set an alarm on your phone to remind you. The pharmacy carries pill boxes that you can place next to an area you pass everyday (such as where you place your car keys or where you charge your phone)   Is financial support available?  Yes, [employer] can provide co-pay cards if you have  commercial insurance or patient assistance if you have Medicare or no insurance.    Which vaccines are recommended for me? Talk to your doctor about these vaccines: Flu, Coronavirus (COVID-19), Pneumococcal (pneumonia), Tdap, Hepatitis B, Zoster (shingles)         Adherence and Self-Administration  • Barriers to Patient Adherence and/or Self-Administration: None    • Methods for Supporting Patient Adherence and/or Self-Administration: N/A     Goals of Therapy   Goals     • Specialty Pharmacy General Goal      Decrease frequency and severity of migraines             Reassessment Plan & Follow-Up  1. Medication Therapy Changes: Add Qulipta 60mg po daily and Nurtec 75mg daily prn (max 1 dose in 24 hour period).  2. Additional Plans, Therapy Recommendations, or Therapy Problems to Be Addressed: None  3. Pharmacist to perform regular reassessments no more than (6) months from the previous assessment.  4. Welcome information and patient satisfaction survey to be sent by retail team with patient's initial fill.  5. Care Coordinator to set up future refill outreaches, coordinate prescription delivery, and escalate clinical questions to pharmacist.     Attestation  I attest that the initiated specialty medication(s) are appropriate for the patient based on my assessment.  If the prescribed therapy is at any point deemed not appropriate based on the current or future assessments, a consultation will be initiated with the patient's specialty care provider to determine the best course of action. The revised plan of therapy will be documented along with any additional patient education provided.     Alicja Issa, PharmD, MPA, BCPS, MSCS  12/15/2022  15:08 EST

## 2023-01-14 DIAGNOSIS — F33.2 SEVERE EPISODE OF RECURRENT MAJOR DEPRESSIVE DISORDER, WITHOUT PSYCHOTIC FEATURES: Chronic | ICD-10-CM

## 2023-01-14 DIAGNOSIS — F41.1 GENERALIZED ANXIETY DISORDER: Chronic | ICD-10-CM

## 2023-01-14 DIAGNOSIS — F90.0 ADHD (ATTENTION DEFICIT HYPERACTIVITY DISORDER), INATTENTIVE TYPE: Chronic | ICD-10-CM

## 2023-01-16 RX ORDER — DEXTROAMPHETAMINE SACCHARATE, AMPHETAMINE ASPARTATE MONOHYDRATE, DEXTROAMPHETAMINE SULFATE AND AMPHETAMINE SULFATE 3.75; 3.75; 3.75; 3.75 MG/1; MG/1; MG/1; MG/1
15 CAPSULE, EXTENDED RELEASE ORAL EVERY MORNING
Qty: 30 CAPSULE | Refills: 0 | Status: SHIPPED | OUTPATIENT
Start: 2023-01-16 | End: 2023-02-22 | Stop reason: SDUPTHER

## 2023-01-16 RX ORDER — FLUOXETINE HYDROCHLORIDE 40 MG/1
40 CAPSULE ORAL DAILY
Qty: 30 CAPSULE | Refills: 2 | Status: SHIPPED | OUTPATIENT
Start: 2023-01-16

## 2023-01-16 NOTE — TELEPHONE ENCOUNTER
Rx Refill Note  Requested Prescriptions     Pending Prescriptions Disp Refills   • FLUoxetine (PROzac) 40 MG capsule 30 capsule 2     Sig: Take 1 capsule by mouth Daily.   • amphetamine-dextroamphetamine XR (Adderall XR) 15 MG 24 hr capsule 30 capsule 0     Sig: Take 1 capsule by mouth Every Morning      Last office visit with prescribing clinician: 9/6/2022      Next office visit with prescribing clinician: Visit date not found            Rose Ellison MA  01/16/23, 07:44 EST

## 2023-01-24 ENCOUNTER — TELEPHONE (OUTPATIENT)
Dept: NEUROLOGY | Facility: CLINIC | Age: 33
End: 2023-01-24

## 2023-01-24 NOTE — TELEPHONE ENCOUNTER
Pharmacy Name: [facility]    Pharmacy representative name: CHIARA    Pharmacy representative phone number: [phone]    What medication are you calling in regards to: AIMOVIG    What question does the pharmacy have: ADDITIONAL INFORMATION IS NEEDED    Who is the provider that prescribed the medication: SMOOTH ALVARADO    Additional notes: SUBMITTED TODAY AT 9:00AM

## 2023-01-27 ENCOUNTER — TELEPHONE (OUTPATIENT)
Dept: NEUROLOGY | Facility: CLINIC | Age: 33
End: 2023-01-27

## 2023-01-27 NOTE — TELEPHONE ENCOUNTER
Caller: YASMEEN     Relationship: SELF    Best call back number: [phone]    Which medication are you concerned about: QULIPTA AND NURTEC    Who prescribed you this medication: SMOOTH KINCAID    What are your concerns: PATIENT GOT NEW INSURANCE AND NEEDS NEW PRIOR AUTH. SHE DID ADVISE THAT [facility] TOLD HER THEY WILL NOT COVER IT UNLESS SHE HAS TRIED OTHER MEDICATIONS BEFOREHAND. PLEASE REVIEW AND ADVISE

## 2023-01-30 ENCOUNTER — SPECIALTY PHARMACY (OUTPATIENT)
Dept: ONCOLOGY | Facility: HOSPITAL | Age: 33
End: 2023-01-30
Payer: COMMERCIAL

## 2023-01-30 RX ORDER — ERENUMAB-AOOE 140 MG/ML
140 INJECTION, SOLUTION SUBCUTANEOUS
Qty: 1 ML | Refills: 11 | Status: SHIPPED | OUTPATIENT
Start: 2023-01-30

## 2023-01-30 NOTE — PROGRESS NOTES
Specialty Pharmacy Patient Management Program  Neurology Initial and reassessment     Amie Sepulveda is a 32 y.o. female with migraines seen by a Neurology provider and enrolled in the Neurology Patient Management program offered [facility]  An initial outreach was conducted, including assessment of therapy appropriateness and specialty medication education for Aimovig and Nurtec.  Patient is switching from Qulipta to Aimoivg due to new insurance formulary which will not approve Qulipta unless a cgrp injectable has been tried and failed.  The patient was introduced to services offered by [facility], including: regular assessments, refill coordination, curbside pick-up or mail order delivery options, prior authorization maintenance, and financial assistance programs as applicable. The patient was also provided with contact information for the pharmacy team.     Insurance Coverage & Financial Support  [employer], BookFreshovig and Nurtec co-pay card.    Relevant Past Medical History and Comorbidities  Relevant medical history and concomitant health conditions were discussed with the patient. The patient's chart has been reviewed for relevant past medical history and comorbid health conditions and updated as necessary.   Past Medical History:   Diagnosis Date   • ADHD    • Anxiety    • Depression    • Headache, tension-type    • Migraine     2-3 migraines/week. Often with aura. No relief with previous medication.   • Vision loss     With migraines     Social History     Socioeconomic History   • Marital status:    Tobacco Use   • Smoking status: Never   • Smokeless tobacco: Never   Vaping Use   • Vaping Use: Never used   Substance and Sexual Activity   • Alcohol use: Not Currently     Alcohol/week: 2.0 standard drinks     Types: 2 Glasses of wine per week     Comment: Per month   • Drug use: No   • Sexual activity: Yes     Partners: Male     Birth  control/protection: Vasectomy       Problem list reviewed by Mallory Hdez PharmD on 1/30/2023 at  4:25 PM    Allergies  Known allergies and reactions were discussed with the patient. The patient's chart has been reviewed for  allergy information and updated as necessary.   Penicillins    Allergies reviewed by Mallory Hdez PharmD on 1/30/2023 at  4:22 PM  Allergies reviewed by Mallory Hdez PharmD on 1/30/2023 at  4:25 PM    Current Medication List  This medication list has been reviewed with the patient and evaluated for any interactions or necessary modifications/recommendations, and updated to include all prescription medications, OTC medications, and supplements the patient is currently taking.  This list reflects what is contained in the patient's profile, which has also been marked as reviewed to communicate to other providers it is the most up to date version of the patient's current medication therapy.     Current Outpatient Medications:   •  amphetamine-dextroamphetamine XR (Adderall XR) 15 MG 24 hr capsule, Take 1 capsule by mouth Every Morning, Disp: 30 capsule, Rfl: 0  •  Erenumab-aooe (Aimovig) 140 MG/ML auto-injector, Inject 1 mL under the skin into the appropriate area as directed Every 28 (Twenty-Eight) Days., Disp: 1 mL, Rfl: 11  •  FLUoxetine (PROzac) 40 MG capsule, Take 1 capsule by mouth Daily., Disp: 30 capsule, Rfl: 2  •  propranolol (INDERAL) 10 MG tablet, Take 1 tablet by mouth 3 (Three) Times a Day As Needed for Anxiety, Disp: 90 tablet, Rfl: 2  •  Rimegepant Sulfate (Nurtec) 75 MG tablet dispersible tablet, Take 1 tablet by mouth Daily As Needed (migraine)., Disp: 16 tablet, Rfl: 11    Medicines reviewed by Mallory Hdez PharmD on 1/30/2023 at  4:25 PM    Drug Interactions  none     Recommended Medications Assessment    None      Relevant Laboratory Values        Initial Education Provided for Specialty Medication  The patient has been provided with the following education and  any applicable administration techniques (i.e. self-injection) have been demonstrated for the therapies indicated. All questions and concerns have been addressed prior to the patient receiving the medication, and the patient has verbalized understanding of the education and any materials provided.  Additional patient education shall be provided and documented upon request by the patient, provider or payer.                Aimovig (Erenumab-aoee)           Medication Expectations   Why am I taking this medication? You are taking this medication for migraine prophylaxis.   What should I expect while on this medication? You should expect to a decrease in the frequency and severity of your migraines.   How does the medication work? Aimovig is a monoclonal antibody that binds to calcitonin gene-related peptide (CGRP) and blocks its binding to the receptor decreasing the severity of migraines.   How long will I be on this medication for? The amount of time you will be on this medication will be determined by your doctor and your response to the medication.    How do I take this medication? Take as directed on your prescription label. This medication is a self-injection given every 28 days.    What are some possible side effects? Injection site reactions and hypersensitivity reactions, constipation, new or worsening hypertension.   What happens if I miss a dose? If you miss a dose, take it as soon as you remember, and time next dose 28 days from last dose.                  Medication Safety   What are things I should warn my doctor immediately about? Hypersensitivity reactions, development or worsening of hypertension that may occur anytime during treatment. Tell you doctor if you develop new or worsening hypertension    What are things that I should be cautious of? Injection site reaction, constipation, new or worsening hypertension.   What are some medications that can interact with this one? No drug interactions  identified.            Medication Storage/Handling   How should I handle this medication? Keep this medication our of reach of pets/children in original container.  On the day your Aimovig is due let it set at room temperature for 30 minutes prior to injection. (do NOT warm using a heat source such as hot water or a microwave).  Administer in the abdomen, thigh, back of the upper arm, or buttocks.  Do not inject where the skin is tender, bruised, red or hard.  Rotate injection sites.   How does this medication need to be stored? Store in refrigerator and keep dry.   How should I dispose of this medication? You can dispose of the empty syringe in a sharps container, and if this is not available you may use an empty hard plastic container such as a milk jug or tide container.            Resources/Support   How can I remind myself to take this medication? You can download a reminder carole on your phone or use a calandar  to help with your monthly injection.   Is financial support available?  Yes, [employer] can provide co-pay cards if you have commercial insurance or patient assistance if you have Medicare or no insurance.    Which vaccines are recommended for me? Talk to your doctor about these vaccines: Flu, Coronavirus (COVID-19), Pneumococcal (pneumonia), Tdap, Hepatitis B, Zoster (shingles)                  Adherence and Self-Administration  • Barriers to Patient Adherence and/or Self-Administration: none    • Methods for Supporting Patient Adherence and/or Self-Administration: Aimovig self-injection teaching during this visit.           Adverse Drug Reactions  • Adverse Reactions Experienced: none  • Plan for ADR Management: not required     Hospitalizations and Urgent Care Since Last Assessment  • Hospitalizations or Admissions: none   • ED Visits: none   • Urgent Office Visits: none       Quality of Life Assessment   Quality of Life related to the patient's specialty therapy was discussed with the  patient. The QOL segment of this outreach has been reviewed and updated.     Quality of Life Assessment  Quality of Life Improvement Scale: Moderately better    Goals of Therapy   Goals     • Specialty Pharmacy General Goal      Decrease frequency and severity of migraines             Reassessment Plan & Follow-Up  1. Medication Therapy Changes: d/c Qulipta due to insurance formulary change and start Aimovig 140 mg SubQ every 28 days.  2. Additional Plans, Therapy Recommendations, or Therapy Problems to Be Addressed: none   3. Pharmacist to perform regular reassessments no more than (6) months from the previous assessment.  4. Welcome information and patient satisfaction survey to be sent by retail team with patient's initial fill.  5. Care Coordinator to set up future refill outreaches, coordinate prescription delivery, and escalate clinical questions to pharmacist.     Attestation  I attest that the initiated specialty medication(s) are appropriate for the patient based on my assessment.  If the prescribed therapy is at any point deemed not appropriate based on the current or future assessments, a consultation will be initiated with the patient's specialty care provider to determine the best course of action. The revised plan of therapy will be documented along with any additional patient education provided.     Mallory Hdez, PharmD  1/30/2023  16:26 EST

## 2023-01-30 NOTE — PROGRESS NOTES
Specialty Pharmacy Refill Coordination Note     Amie is a 32 y.o. female contacted today regarding refills of  Aimovig and Nurtec specialty medication(s).    Reviewed and verified with patient:  Allergies  Meds  Problems       Specialty medication(s) and dose(s) confirmed: yes    Refill Questions    Flowsheet Row Most Recent Value   Changes to allergies? No   Changes to medications? Yes  [Patient is switching to Aimovig. Qulipta not formulary on new ins]   New conditions since last clinic visit No   Unplanned office visit, urgent care, ED, or hospital admission in the last 4 weeks  No   How does patient/caregiver feel medication is working? Very good   Financial problems or insurance changes  No   Since the previous refill, were any specialty medication doses or scheduled injections missed or delayed?  No   Does this patient require a clinical escalation to a pharmacist? No          Delivery Questions    Flowsheet Row Most Recent Value   Delivery method  at Pharmacy   Preferred delivery time? Anytime   Number of medications in delivery 2   Medication being filled and delivered Aimovig and Nurtec   Doses left of specialty medications Aimovig=New Start   Is there any medication that is due not being filled? No   Supplies needed? No supplies needed   Cooler needed? No   Do any medications need mixed or dated? No   Copay form of payment Payment plan already set up   Questions or concerns for the pharmacist? No   Are any medications first time fills? Yes                 Follow-up: 28 day(s)     Olivia Rios, Pharmacy Technician  Specialty Pharmacy Technician

## 2023-02-17 ENCOUNTER — DOCUMENTATION (OUTPATIENT)
Dept: ONCOLOGY | Facility: HOSPITAL | Age: 33
End: 2023-02-17
Payer: COMMERCIAL

## 2023-02-22 DIAGNOSIS — F41.1 GENERALIZED ANXIETY DISORDER: Chronic | ICD-10-CM

## 2023-02-22 DIAGNOSIS — F90.0 ADHD (ATTENTION DEFICIT HYPERACTIVITY DISORDER), INATTENTIVE TYPE: Chronic | ICD-10-CM

## 2023-02-23 RX ORDER — PROPRANOLOL HYDROCHLORIDE 10 MG/1
10 TABLET ORAL 3 TIMES DAILY PRN
Qty: 90 TABLET | Refills: 2 | Status: SHIPPED | OUTPATIENT
Start: 2023-02-23

## 2023-02-23 RX ORDER — DEXTROAMPHETAMINE SACCHARATE, AMPHETAMINE ASPARTATE MONOHYDRATE, DEXTROAMPHETAMINE SULFATE AND AMPHETAMINE SULFATE 3.75; 3.75; 3.75; 3.75 MG/1; MG/1; MG/1; MG/1
15 CAPSULE, EXTENDED RELEASE ORAL EVERY MORNING
Qty: 30 CAPSULE | Refills: 0 | Status: SHIPPED | OUTPATIENT
Start: 2023-02-23 | End: 2023-02-27 | Stop reason: SDUPTHER

## 2023-02-23 NOTE — TELEPHONE ENCOUNTER
Sending refill, but patient needs a UDS at her next appt on 02/27.  Patient's LENNIE report reviewed and deemed appropriate.  Patient counseled on use of controlled substances.

## 2023-02-27 ENCOUNTER — OFFICE VISIT (OUTPATIENT)
Dept: PSYCHIATRY | Facility: CLINIC | Age: 33
End: 2023-02-27
Payer: COMMERCIAL

## 2023-02-27 ENCOUNTER — LAB (OUTPATIENT)
Dept: LAB | Facility: HOSPITAL | Age: 33
End: 2023-02-27
Payer: COMMERCIAL

## 2023-02-27 VITALS
WEIGHT: 108 LBS | SYSTOLIC BLOOD PRESSURE: 102 MMHG | HEART RATE: 73 BPM | HEIGHT: 62 IN | BODY MASS INDEX: 19.88 KG/M2 | DIASTOLIC BLOOD PRESSURE: 72 MMHG

## 2023-02-27 DIAGNOSIS — F90.0 ADHD (ATTENTION DEFICIT HYPERACTIVITY DISORDER), INATTENTIVE TYPE: Primary | Chronic | ICD-10-CM

## 2023-02-27 DIAGNOSIS — F90.0 ADHD (ATTENTION DEFICIT HYPERACTIVITY DISORDER), INATTENTIVE TYPE: Chronic | ICD-10-CM

## 2023-02-27 DIAGNOSIS — F41.1 GENERALIZED ANXIETY DISORDER: Chronic | ICD-10-CM

## 2023-02-27 DIAGNOSIS — F33.0 MILD EPISODE OF RECURRENT MAJOR DEPRESSIVE DISORDER: Chronic | ICD-10-CM

## 2023-02-27 PROCEDURE — 80306 DRUG TEST PRSMV INSTRMNT: CPT

## 2023-02-27 PROCEDURE — 99214 OFFICE O/P EST MOD 30 MIN: CPT | Performed by: NURSE PRACTITIONER

## 2023-02-27 RX ORDER — DEXTROAMPHETAMINE SACCHARATE, AMPHETAMINE ASPARTATE MONOHYDRATE, DEXTROAMPHETAMINE SULFATE AND AMPHETAMINE SULFATE 3.75; 3.75; 3.75; 3.75 MG/1; MG/1; MG/1; MG/1
15 CAPSULE, EXTENDED RELEASE ORAL EVERY MORNING
Qty: 30 CAPSULE | Refills: 0 | Status: SHIPPED | OUTPATIENT
Start: 2023-02-27

## 2023-02-27 NOTE — PROGRESS NOTES
"Chief Complaint  ADHD, Anxiety, and Depression    Subjective          Amie Sepulveda presents to [facility] for medication management of her ADHD, anxiety, depression.    History of Present Illness: Patient presents today for follow-up appointment rob seen on 10/18/2022.  Patient reports \"I am pretty good today\".  Patient says over the last several weeks her appetite has been better, and she is now up to 108 pounds.  Patient is very happy about that.  She does however feel her anxiety has been getting worse over the last couple months.  Patient says she had a panic attack at work in November.  She has been taking her propranolol on a 3 times daily basis, but is not sure it is significantly helping with her anxiety anymore.  Patient has been taking her other medications consistently, but did take somewhere between 2 and 3 weeks off from her Adderall.  She was advised to do this by neurology.  They wanted to confirm that it was not the cause of, or contributing to her migraines.  Patient says Adderall was not contributing.  She has since restarted taking it on a more consistent basis again.  She does feel it continues to work well for her ADHD symptoms.  Patient is still in school, but only has 2.5 weeks left.  She is looking forward to being done.  Patient's sleep has continued to be very difficult, but this is also related to school.  Patient continues to stay up late to study.  When she is done with school, she is planning to get into a more consistent routine, and go to bed between 9 and 10 PM.  She denies any issues or concerns or appetite.  Patient denies any SI/HI, A/V hallucinations.      The following portions of the patient's history were reviewed and updated as appropriate: allergies, current medications, past family history, past medical history, past social history, past surgical history and problem list.      Current Medications:   Current Outpatient " "Medications   Medication Sig Dispense Refill   • amphetamine-dextroamphetamine XR (Adderall XR) 15 MG 24 hr capsule Take 1 capsule by mouth Every Morning 30 capsule 0   • Erenumab-aooe (Aimovig) 140 MG/ML auto-injector Inject 1 mL under the skin into the appropriate area as directed Every 28 (Twenty-Eight) Days. 1 mL 11   • FLUoxetine (PROzac) 40 MG capsule Take 1 capsule by mouth Daily. 30 capsule 2   • propranolol (INDERAL) 10 MG tablet Take 1 tablet by mouth 3 (Three) Times a Day As Needed for Anxiety 90 tablet 2   • Rimegepant Sulfate (Nurtec) 75 MG tablet dispersible tablet Take 1 tablet by mouth Daily As Needed (migraine). 16 tablet 11     No current facility-administered medications for this visit.       Mental Status Exam:   Hygiene:   good  Cooperation:  Cooperative  Eye Contact:  Good  Psychomotor Behavior:  Appropriate  Affect:  Appropriate  Mood: anxious  Speech:  Normal  Thought Process:  Goal directed  Thought Content:  Mood congruent  Suicidal:  None  Homicidal:  None  Hallucinations:  None  Delusion:  None  Memory:  Intact  Orientation:  Person, Place, Time and Situation  Reliability:  good  Insight:  Good  Judgement:  Good  Impulse Control:  Good  Physical/Medical Issues:  No        Objective   Vital Signs:   /72   Pulse 73   Ht 157.5 cm (62\")   Wt 49 kg (108 lb)   BMI 19.75 kg/m²     Physical Exam  Neurological:      Mental Status: She is oriented to person, place, and time. Mental status is at baseline.      Coordination: Coordination is intact.      Gait: Gait is intact.   Psychiatric:         Behavior: Behavior is cooperative.        Result Review :     The following data was reviewed by: JENNY Ross on 02/27/2023:    Data reviewed: Previous note, medication history          Assessment and Plan    Diagnoses and all orders for this visit:    1. ADHD (attention deficit hyperactivity disorder), inattentive type (Primary)  -     Urine Drug Screen - Urine, Clean Catch; Future  -    "  amphetamine-dextroamphetamine XR (Adderall XR) 15 MG 24 hr capsule; Take 1 capsule by mouth Every Morning  Dispense: 30 capsule; Refill: 0    2. Generalized anxiety disorder    3. Mild episode of recurrent major depressive disorder (HCC)         PHQ-9 Score:   PHQ-9 Total Score: 11      Depression Screening:  Patient screened positive for depression based on a PHQ-9 score of 11 on 2/27/2023. Follow-up recommendations include: Prescribed antidepressant medication treatment and Suicide Risk Assessment performed.        Tobacco Cessation:  Patient has denied an present or past tobacco use. No tobacco cessation education necessary.       Impression/Plan:  -This is a follow-up appointment.  Patient presents today and reports she has been struggling more so with her anxiety over the last several weeks.  Discussed increasing her Prozac or her propranolol.  Advised patient we would start by increasing her propranolol slowly.  If she experiences any lightheadedness or dizziness I advised the patient to notify me and we would look at increasing her Prozac instead.  -Increase propranolol to 10 to 20 mg 3 times daily as needed.  -Maintain Adderall XR 15 mg daily.  -Maintain Prozac 40 mg daily.  -The patient is being prescribed a controlled substance as part of the treatment plan. The patient/guardian has been educated of appropriate use of the medication(s), including risks and side effects such as insomnia, headache, exacerbation of tics, nervousness, irritability, overstimulation, tremor, dizziness, anorexia or change in appetite, nausea, dry mouth, constipation, diarrhea, weight loss, sexual dysfunction, psychotic episodes, seizures, palpitations, tachycardia, hypertension, rare activation (activation of hypomania, ellis, and/or suicidal ideations), cardiovascular adverse effects including sudden death (especially patients with pre-existing structural abnormalities often associated with a family history of cardiac disease),  may slow normal growth in children, weight gain is reported but not expected, sedation is possible but activation is much more common, metabolic adversities, and overdose among others. Patient/guardian is also informed that the medication is to be used by the patient only, the medication is to be used only as directed, and the medication should not be combined with other substances unless directed by a Provider/Prescriber. The patient/guardian verbalized understanding and agreement with this in their own words, and wish to continue with current treatment plan.  Controlled substance agreement completed and filed in the patient's chart.  -Patient's LENNIE report reviewed and deemed appropriate.  Patient counseled on use of controlled substances.  UDS performed today at [facility] outpatient lab.  -Reviewed available lab work.  -Schedule follow-up appointment for 6 weeks or as needed.      MEDS ORDERED DURING VISIT:  New Medications Ordered This Visit   Medications   • amphetamine-dextroamphetamine XR (Adderall XR) 15 MG 24 hr capsule     Sig: Take 1 capsule by mouth Every Morning     Dispense:  30 capsule     Refill:  0         Follow Up   Return in about 6 weeks (around 4/10/2023), or if symptoms worsen or fail to improve, for Next scheduled follow up, In-Person Appt.  Patient was given instructions and counseling regarding her condition or for health maintenance advice. Please see specific information pulled into the AVS if appropriate.       TREATMENT PLAN/GOALS: Continue supportive psychotherapy efforts and medications as indicated. Treatment and medication options discussed during today's visit. Patient acknowledged and verbally consented to continue with current treatment plan and was educated on the importance of compliance with treatment and follow-up appointments.    MEDICATION ISSUES:  Discussed medication options and treatment plan of prescribed medication as well as the risks, benefits, and side effects including  potential falls, possible impaired driving and metabolic adversities among others. Patient is agreeable to call the office with any worsening of symptoms or onset of side effects. Patient is agreeable to call 911 or go to the nearest ER should he/she begin having SI/HI.          This document has been electronically signed by JENNY Desir, PMHNP-BC  February 27, 2023 14:45 EST      Part of this note may be an electronic transcription/translation of spoken language to printed text using the Dragon Dictation System.

## 2023-03-07 LAB
AMPHET+METHAMPHET UR QL: NEGATIVE
AMPHETAMINES UR QL: NEGATIVE
BARBITURATES UR QL SCN: NEGATIVE
BENZODIAZ UR QL SCN: NEGATIVE
BUPRENORPHINE SERPL-MCNC: NEGATIVE NG/ML
CANNABINOIDS SERPL QL: NEGATIVE
COCAINE UR QL: NEGATIVE
METHADONE UR QL SCN: NEGATIVE
OPIATES UR QL: NEGATIVE
OXYCODONE UR QL SCN: NEGATIVE
PCP UR QL SCN: NEGATIVE
PROPOXYPH UR QL: NEGATIVE
TRICYCLICS UR QL SCN: NEGATIVE

## 2023-03-09 ENCOUNTER — SPECIALTY PHARMACY (OUTPATIENT)
Dept: ONCOLOGY | Facility: HOSPITAL | Age: 33
End: 2023-03-09
Payer: COMMERCIAL

## 2023-03-09 NOTE — PROGRESS NOTES
Specialty Pharmacy Refill Coordination Note     Amie is a 32 y.o. female contacted today regarding refills of  Aimovig and Nurtec specialty medication(s).    Reviewed and verified with patient:       Specialty medication(s) and dose(s) confirmed: yes    Refill Questions    Flowsheet Row Most Recent Value   Changes to allergies? No   Changes to medications? No   New conditions since last clinic visit No   Unplanned office visit, urgent care, ED, or hospital admission in the last 4 weeks  No   How does patient/caregiver feel medication is working? Very good   Financial problems or insurance changes  No   Since the previous refill, were any specialty medication doses or scheduled injections missed or delayed?  No   Does this patient require a clinical escalation to a pharmacist? No          Delivery Questions    Flowsheet Row Most Recent Value   Delivery method  at Pharmacy   Preferred delivery time? Anytime   Number of medications in delivery 2   Medication being filled and delivered Aimovig and Nurtec   Doses left of specialty medications Aimovig=0 Nurtec=?   Is there any medication that is due not being filled? No   Supplies needed? No supplies needed   Cooler needed? No   Do any medications need mixed or dated? No   Copay form of payment Pay at pickup   Questions or concerns for the pharmacist? No   Are any medications first time fills? No                 Follow-up: 28 day(s)     Olivia Rios, Pharmacy Technician  Specialty Pharmacy Technician

## 2023-03-14 ENCOUNTER — OFFICE VISIT (OUTPATIENT)
Dept: NEUROLOGY | Facility: CLINIC | Age: 33
End: 2023-03-14
Payer: COMMERCIAL

## 2023-03-14 VITALS
SYSTOLIC BLOOD PRESSURE: 106 MMHG | DIASTOLIC BLOOD PRESSURE: 66 MMHG | BODY MASS INDEX: 18.99 KG/M2 | TEMPERATURE: 96.9 F | HEIGHT: 62 IN | HEART RATE: 86 BPM | OXYGEN SATURATION: 98 % | WEIGHT: 103.2 LBS

## 2023-03-14 DIAGNOSIS — G43.109 MIGRAINE WITH AURA AND WITHOUT STATUS MIGRAINOSUS, NOT INTRACTABLE: Primary | ICD-10-CM

## 2023-03-14 PROCEDURE — 99214 OFFICE O/P EST MOD 30 MIN: CPT | Performed by: NURSE PRACTITIONER

## 2023-03-14 RX ORDER — TOPIRAMATE 50 MG/1
TABLET, FILM COATED ORAL
Qty: 60 TABLET | Refills: 10 | Status: SHIPPED | OUTPATIENT
Start: 2023-03-14

## 2023-03-14 NOTE — PROGRESS NOTES
Neuro Office Visit      Encounter Date: 2023   Patient Name: Amie Sepulveda  : 1990   MRN: [id number]   PCP: Dr Swati Alba  Chief Complaint:    Chief Complaint   Patient presents with   • Migraine       History of Present Illness: Amie Sepulveda is a 32 y.o. female who is here today in Neurology for migraine headache.    Last visit 2022 w me-cont beta blocker. Samples of nurtec as abortive. Start Qulipta. Elavil contraindicated with fluoxetine.    Ins would not approve Qulipta. Starting Aimovig.    No side effects from Aimovig.      Headaches  Has had Aimovig x 2 injections  Days per month: 8-12 migraines a month. Will have daily mild headache  Location: Occiput , Right Eye and Left Eye, mild headaches are occipital  Quality: Sharp and Throbbing        Duration: 2 hours  Severity: 7/10.  Triggers: dehydration, cycles, disrupted sleep  Associated Symptoms: Nausea, Photophobia, Phonophobia, Dizziness and  Vision changes difficult to read on white paper  Aura: loss of central vision, gold mccarty on periphery, 20 minutes later pain will start     Ocular Migraines  Will have 1-2 ocular migraines a month. Will have same aura and vision loss with no pain, duration 30 min     Hemiplegic Migraine  2 years ago 18 while pregnant had episode of migraine seen in ED. Had parethesia bilat hand and arms, right sided weakness, confusion for 30 minutes memory loss, felt like she couldn't speak. Seen in ED had MRI and EKG. Treated with IVF. Paresthesia lasted all day. Memory loss also returned. Treated with mag and riboflavin        Abortives: imitrex  Preventives: beta blocker, Aimovig     Does not feel the adderall triggers her headaches.                                                                             PH  Onset as a teenager. Increased frequency in last 6 years.  Could be related to stress with 4 kids and she works as a behavior therapist w autism.  2 of her children has autism as  well. Full -time student for master's     MRI Brain Without Contrast (05/08/2020 15:55)-nml  PMH: ADD, covid infection 9/2021.  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  w vasectomy.  Subjective      Past Medical History:   Past Medical History:   Diagnosis Date   • ADHD    • Anxiety    • Depression    • Headache, tension-type    • Migraine     2-3 migraines/week. Often with aura. No relief with previous medication.   • Vision loss     With migraines       Past Surgical History:   Past Surgical History:   Procedure Laterality Date   • APPENDECTOMY     • DILATATION AND CURETTAGE     • ULNAR NERVE TRANSPOSITION     • WISDOM TOOTH EXTRACTION         Family History:   Family History   Problem Relation Age of Onset   • Lung cancer Paternal Grandfather         smoker   • Hypertension Father    • Diabetes Maternal Grandfather    • Kidney disease Paternal Grandmother    • Heart disease Paternal Grandmother    • Stroke Paternal Grandmother    • Dementia Maternal Grandmother        Social History:   Social History     Socioeconomic History   • Marital status:    Tobacco Use   • Smoking status: Never   • Smokeless tobacco: Never   Vaping Use   • Vaping Use: Never used   Substance and Sexual Activity   • Alcohol use: Not Currently     Alcohol/week: 2.0 standard drinks     Types: 2 Glasses of wine per week     Comment: Per month   • Drug use: No   • Sexual activity: Defer     Partners: Male     Birth control/protection: Vasectomy       Medications:     Current Outpatient Medications:   •  amphetamine-dextroamphetamine XR (Adderall XR) 15 MG 24 hr capsule, Take 1 capsule by mouth Every Morning, Disp: 30 capsule, Rfl: 0  •  Erenumab-aooe (Aimovig) 140 MG/ML auto-injector, Inject 1 mL under the skin into the appropriate area as directed Every 28 (Twenty-Eight) Days., Disp: 1 mL, Rfl: 11  •  FLUoxetine (PROzac) 40 MG capsule, Take 1 capsule by mouth Daily., Disp: 30 capsule, Rfl: 2  •  propranolol  "(INDERAL) 10 MG tablet, Take 1 tablet by mouth 3 (Three) Times a Day As Needed for Anxiety, Disp: 90 tablet, Rfl: 2  •  Rimegepant Sulfate (Nurtec) 75 MG tablet dispersible tablet, Take 1 tablet by mouth Daily As Needed (migraine)., Disp: 16 tablet, Rfl: 11  •  topiramate (TOPAMAX) 50 MG tablet, Take 0.5 Tablets By Mouth twice daily for 1 Month, then increase to 1 Tablet By mouth twice daily, Disp: 60 tablet, Rfl: 10    Allergies:   Allergies   Allergen Reactions   • Penicillins Rash       PHQ-9 Total Score:     [facility].    Objective     Physical Exam:   Physical Exam  Vitals and nursing note reviewed.   Constitutional:       General: She is not in acute distress.     Appearance: She is well-developed.   HENT:      Head: Normocephalic and atraumatic.      Right Ear: External ear normal.      Left Ear: External ear normal.      Nose: Nose normal.   Eyes:      General: No scleral icterus.        Right eye: No discharge.         Left eye: No discharge.      Conjunctiva/sclera: Conjunctivae normal.   Cardiovascular:      Rate and Rhythm: Normal rate.   Pulmonary:      Effort: Pulmonary effort is normal.   Musculoskeletal:         General: No deformity.      Cervical back: Neck supple.   Skin:     General: Skin is warm and dry.      Findings: No rash.   Neurological:      General: No focal deficit present.      Mental Status: She is alert and oriented to person, place, and time. Mental status is at baseline.      Motor: No abnormal muscle tone.   Psychiatric:         Mood and Affect: Mood normal.         Behavior: Behavior normal.         Thought Content: Thought content normal.         Judgment: Judgment normal.         Neurologic Exam     Mental Status   Oriented to person, place, and time.        Vital Signs:   Vitals:    03/14/23 1129   BP: 106/66   Pulse: 86   Temp: 96.9 °F (36.1 °C)   SpO2: 98%   Weight: 46.8 kg (103 lb 3.2 oz)   Height: 157.5 cm (62.01\")     Body mass " index is 18.87 kg/m².       Assessment / Plan      Assessment/Plan:   Diagnoses and all orders for this visit:    1. Migraine with aura and without status migrainosus, not intractable (Primary)  Comments:  No improvement with cgrp. Cont propranolol and aimovig. Add TPM. Watch for weight loss. If no improvement in HA. obtain MRI brain.  Orders:  -     topiramate (TOPAMAX) 50 MG tablet; Take 0.5 Tablets By Mouth twice daily for 1 Month, then increase to 1 Tablet By mouth twice daily  Dispense: 60 tablet; Refill: 10           Patient Education:     Reviewed medications, potential side effects and signs and symptoms to report. Discussed risk versus benefits of treatment plan with patient and/or family-including medications, labs and radiology that may be ordered. Addressed questions and concerns during visit. Patient and/or family verbalized understanding and agree with plan. Instructed to call the office with any questions and report to ER with any life-threatening symptoms.     Follow Up:   Return in about 3 months (around 6/14/2023) for Recheck.    During this visit the following were done:  Labs Reviewed []    Labs Ordered []    Radiology Reports Reviewed []    Radiology Ordered []    PCP Records Reviewed []    Referring Provider Records Reviewed []    ER Records Reviewed []    Hospital Records Reviewed []    History Obtained From Family []    Radiology Images Reviewed []    Other Reviewed [x]    Records Requested []      Francesca Frank, LILA, APRN

## 2023-04-10 ENCOUNTER — DOCUMENTATION (OUTPATIENT)
Dept: ONCOLOGY | Facility: HOSPITAL | Age: 33
End: 2023-04-10
Payer: COMMERCIAL

## 2023-04-28 DIAGNOSIS — F90.0 ADHD (ATTENTION DEFICIT HYPERACTIVITY DISORDER), INATTENTIVE TYPE: Chronic | ICD-10-CM

## 2023-04-28 DIAGNOSIS — F41.1 GENERALIZED ANXIETY DISORDER: Chronic | ICD-10-CM

## 2023-04-28 DIAGNOSIS — F33.2 SEVERE EPISODE OF RECURRENT MAJOR DEPRESSIVE DISORDER, WITHOUT PSYCHOTIC FEATURES: Chronic | ICD-10-CM

## 2023-05-01 RX ORDER — DEXTROAMPHETAMINE SACCHARATE, AMPHETAMINE ASPARTATE MONOHYDRATE, DEXTROAMPHETAMINE SULFATE AND AMPHETAMINE SULFATE 3.75; 3.75; 3.75; 3.75 MG/1; MG/1; MG/1; MG/1
15 CAPSULE, EXTENDED RELEASE ORAL EVERY MORNING
Qty: 30 CAPSULE | Refills: 0 | Status: SHIPPED | OUTPATIENT
Start: 2023-05-01

## 2023-05-01 RX ORDER — FLUOXETINE HYDROCHLORIDE 40 MG/1
40 CAPSULE ORAL DAILY
Qty: 90 CAPSULE | Refills: 3 | Status: SHIPPED | OUTPATIENT
Start: 2023-05-01

## 2023-05-02 ENCOUNTER — OFFICE VISIT (OUTPATIENT)
Dept: PSYCHIATRY | Facility: CLINIC | Age: 33
End: 2023-05-02
Payer: COMMERCIAL

## 2023-05-02 VITALS
BODY MASS INDEX: 19.32 KG/M2 | WEIGHT: 105 LBS | HEIGHT: 62 IN | DIASTOLIC BLOOD PRESSURE: 82 MMHG | HEART RATE: 110 BPM | SYSTOLIC BLOOD PRESSURE: 120 MMHG

## 2023-05-02 DIAGNOSIS — F41.1 GENERALIZED ANXIETY DISORDER: Primary | Chronic | ICD-10-CM

## 2023-05-02 DIAGNOSIS — F33.0 MILD EPISODE OF RECURRENT MAJOR DEPRESSIVE DISORDER: Chronic | ICD-10-CM

## 2023-05-02 DIAGNOSIS — F90.0 ADHD (ATTENTION DEFICIT HYPERACTIVITY DISORDER), INATTENTIVE TYPE: Chronic | ICD-10-CM

## 2023-05-02 PROCEDURE — 99214 OFFICE O/P EST MOD 30 MIN: CPT | Performed by: NURSE PRACTITIONER

## 2023-05-02 NOTE — PROGRESS NOTES
"Chief Complaint  ADHD, Anxiety, and Depression    Subjective          Amie Sepulveda presents to [facility] for medication management of her ADHD, anxiety, depression.    History of Present Illness: Patient presents today for a follow-up appointment after the being seen on 02/27/2023.  At that appointment her propranolol was increased.  Patient reports today she is doing well.  Patient says \"honestly I am feeling better than I have in a long time\".  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  Patient says she still needs approximately 1500 hrs. until she will be done.  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  She says increasing propranolol at her last appointment has been beneficial, and says she has not been using her propranolol as frequently as she was.  She says she typically uses it around twice daily now, maybe 5 times a week.  She was taking it 3 times a day consistently 7 days a week.  She also reports sleep has been difficult for her still, and she continues to be fatigued during the day.  Patient says her sleep is generally disrupted by her 2-year-old daughter who has night terrors.  She says she is eating well and denies issues with her appetite.  Patient denies any SI/HI, A/V hallucinations.      The following portions of the patient's history were reviewed and updated as appropriate: allergies, current medications, past family history, past medical history, past social history, past surgical history and problem list.      Current Medications:   Current Outpatient Medications   Medication Sig Dispense Refill   • amphetamine-dextroamphetamine XR (Adderall XR) 15 MG 24 hr capsule Take 1 capsule by mouth Every Morning 30 capsule 0   • Erenumab-aooe (Aimovig) 140 MG/ML auto-injector Inject 1 mL under the skin into the appropriate area as directed Every 28 " "(Twenty-Eight) Days. 1 mL 11   • FLUoxetine (PROzac) 40 MG capsule Take 1 capsule by mouth Daily. 90 capsule 3   • propranolol (INDERAL) 10 MG tablet Take 1 tablet by mouth 3 (Three) Times a Day As Needed for Anxiety 90 tablet 2   • Rimegepant Sulfate (Nurtec) 75 MG tablet dispersible tablet Take 1 tablet by mouth Daily As Needed (migraine). 16 tablet 11   • topiramate (TOPAMAX) 50 MG tablet Take 0.5 Tablets By Mouth twice daily for 1 Month, then increase to 1 Tablet By mouth twice daily 60 tablet 10     No current facility-administered medications for this visit.       Mental Status Exam:   Hygiene:   good  Cooperation:  Cooperative  Eye Contact:  Good  Psychomotor Behavior:  Appropriate  Affect:  Appropriate  Mood: euthymic  Speech:  Normal  Thought Process:  Goal directed  Thought Content:  Mood congruent  Suicidal:  None  Homicidal:  None  Hallucinations:  None  Delusion:  None  Memory:  Intact  Orientation:  Person, Place, Time and Situation  Reliability:  good  Insight:  Good  Judgement:  Good  Impulse Control:  Good  Physical/Medical Issues:  No        Objective   Vital Signs:   /82   Pulse 110   Ht 157.5 cm (62\")   Wt 47.6 kg (105 lb)   BMI 19.20 kg/m²     Physical Exam  Neurological:      Mental Status: She is oriented to person, place, and time. Mental status is at baseline.      Coordination: Coordination is intact.      Gait: Gait is intact.   Psychiatric:         Behavior: Behavior is cooperative.        Result Review :     The following data was reviewed by: JENNY Ross on 05/02/2023:    Data reviewed: Previous note, medication history          Assessment and Plan    Diagnoses and all orders for this visit:    1. Generalized anxiety disorder (Primary)    2. ADHD (attention deficit hyperactivity disorder), inattentive type    3. Mild episode of recurrent major depressive disorder         PHQ-9 Score:   PHQ-9 Total Score: 10      Depression Screening:  Patient screened positive for " depression based on a PHQ-9 score of 10 on 5/2/2023. Follow-up recommendations include: Prescribed antidepressant medication treatment and Suicide Risk Assessment performed.        Tobacco Cessation:  Patient has denied an present or past tobacco use. No tobacco cessation education necessary.       Impression/Plan:  -This is a follow-up appointment.  Patient presents today and reports she has been doing well overall since her last appointment.  She feels she is actually doing better now than she has for quite some time.  She says she has been taking her medications consistently and denies any adverse effects associated with them.  She reports Adderall continues to work well for her ADHD symptoms.  She is focused and completing tasks.  Patient also reports her anxiety is somewhat better after her increase in propranolol, and she takes it daily with her Prozac.  Patient says overall she is happy with how well she is doing.  -Maintain Adderall XR 15 mg daily.  -Maintain Prozac 40 mg daily.  -Maintain propranolol 20 mg 3 times daily as needed.  -The patient is being prescribed a controlled substance as part of the treatment plan. The patient/guardian has been educated of appropriate use of the medication(s), including risks and side effects such as insomnia, headache, exacerbation of tics, nervousness, irritability, overstimulation, tremor, dizziness, anorexia or change in appetite, nausea, dry mouth, constipation, diarrhea, weight loss, sexual dysfunction, psychotic episodes, seizures, palpitations, tachycardia, hypertension, rare activation (activation of hypomania, ellis, and/or suicidal ideations), cardiovascular adverse effects including sudden death (especially patients with pre-existing structural abnormalities often associated with a family history of cardiac disease), may slow normal growth in children, weight gain is reported but not expected, sedation is possible but activation is much more common, metabolic  adversities, and overdose among others. Patient/guardian is also informed that the medication is to be used by the patient only, the medication is to be used only as directed, and the medication should not be combined with other substances unless directed by a Provider/Prescriber. The patient/guardian verbalized understanding and agreement with this in their own words, and wish to continue with current treatment plan.  Controlled substance agreement completed and filed in the patient's chart.  -Patient's LENNIE report reviewed and deemed appropriate.  Patient counseled on use of controlled substances.  UDS performed 02/27/2023.  -Reviewed available lab work.  -Schedule follow-up appointment for 12 weeks or as needed.      MEDS ORDERED DURING VISIT:  No orders of the defined types were placed in this encounter.        Follow Up   Return in about 12 weeks (around 7/25/2023), or if symptoms worsen or fail to improve, for Next scheduled follow up, In-Person Appt.  Patient was given instructions and counseling regarding her condition or for health maintenance advice. Please see specific information pulled into the AVS if appropriate.       TREATMENT PLAN/GOALS: Continue supportive psychotherapy efforts and medications as indicated. Treatment and medication options discussed during today's visit. Patient acknowledged and verbally consented to continue with current treatment plan and was educated on the importance of compliance with treatment and follow-up appointments.    MEDICATION ISSUES:  Discussed medication options and treatment plan of prescribed medication as well as the risks, benefits, and side effects including potential falls, possible impaired driving and metabolic adversities among others. Patient is agreeable to call the office with any worsening of symptoms or onset of side effects. Patient is agreeable to call 911 or go to the nearest ER should he/she begin having SI/HI.          This document has been  electronically signed by JENNY Desir, PMHNP-BC  May 2, 2023 13:56 EDT      Part of this note may be an electronic transcription/translation of spoken language to printed text using the Dragon Dictation System.

## 2023-06-02 DIAGNOSIS — F90.0 ADHD (ATTENTION DEFICIT HYPERACTIVITY DISORDER), INATTENTIVE TYPE: Chronic | ICD-10-CM

## 2023-06-05 RX ORDER — DEXTROAMPHETAMINE SACCHARATE, AMPHETAMINE ASPARTATE MONOHYDRATE, DEXTROAMPHETAMINE SULFATE AND AMPHETAMINE SULFATE 3.75; 3.75; 3.75; 3.75 MG/1; MG/1; MG/1; MG/1
15 CAPSULE, EXTENDED RELEASE ORAL EVERY MORNING
Qty: 30 CAPSULE | Refills: 0 | Status: SHIPPED | OUTPATIENT
Start: 2023-06-05

## 2023-07-25 ENCOUNTER — DOCUMENTATION (OUTPATIENT)
Dept: ONCOLOGY | Facility: HOSPITAL | Age: 33
End: 2023-07-25
Payer: COMMERCIAL

## 2023-07-28 DIAGNOSIS — F90.0 ADHD (ATTENTION DEFICIT HYPERACTIVITY DISORDER), INATTENTIVE TYPE: Chronic | ICD-10-CM

## 2023-07-31 RX ORDER — DEXTROAMPHETAMINE SACCHARATE, AMPHETAMINE ASPARTATE MONOHYDRATE, DEXTROAMPHETAMINE SULFATE AND AMPHETAMINE SULFATE 3.75; 3.75; 3.75; 3.75 MG/1; MG/1; MG/1; MG/1
15 CAPSULE, EXTENDED RELEASE ORAL EVERY MORNING
Qty: 30 CAPSULE | Refills: 0 | Status: SHIPPED | OUTPATIENT
Start: 2023-07-31

## 2023-09-11 ENCOUNTER — TELEPHONE (OUTPATIENT)
Dept: NEUROLOGY | Facility: CLINIC | Age: 33
End: 2023-09-11
Payer: COMMERCIAL

## 2023-09-11 NOTE — TELEPHONE ENCOUNTER
Called pharmacy and gave provider's note.      Pharmacy called regarding Nurtec and wanted to know how many and if it is abortive because it was written for 16 and insurance may not pay depending on how it is written.

## 2023-09-14 ENCOUNTER — OFFICE VISIT (OUTPATIENT)
Dept: PSYCHIATRY | Facility: CLINIC | Age: 33
End: 2023-09-14
Payer: COMMERCIAL

## 2023-09-14 VITALS
SYSTOLIC BLOOD PRESSURE: 102 MMHG | BODY MASS INDEX: 19.14 KG/M2 | HEART RATE: 90 BPM | DIASTOLIC BLOOD PRESSURE: 68 MMHG | HEIGHT: 62 IN | WEIGHT: 104 LBS

## 2023-09-14 DIAGNOSIS — F33.0 MILD EPISODE OF RECURRENT MAJOR DEPRESSIVE DISORDER: Chronic | ICD-10-CM

## 2023-09-14 DIAGNOSIS — F41.1 GENERALIZED ANXIETY DISORDER: Chronic | ICD-10-CM

## 2023-09-14 DIAGNOSIS — F90.0 ADHD (ATTENTION DEFICIT HYPERACTIVITY DISORDER), INATTENTIVE TYPE: Primary | Chronic | ICD-10-CM

## 2023-09-14 PROCEDURE — 99214 OFFICE O/P EST MOD 30 MIN: CPT | Performed by: NURSE PRACTITIONER

## 2023-09-14 NOTE — PROGRESS NOTES
"Chief Complaint  ADHD, Anxiety, and Depression    Subjective              Amie Sepulveda presents to [facility] for medication management of her ADHD, anxiety, depression.    History of Present Illness: Patient presents today for a follow-up appointment after the being seen on 05/02/2023.  Patient presents today and says overall she has been doing well since her last appointment.  She says she has been struggling with a significant amount of work stress recently.  She says she interviewed for and has been offered a different position with a different company.  She says this job would be the job she would expect to get in a few years, but she has an opportunity to get it now.  She says it would be much more money and a good step forward in her career, but she is feeling guilty about potentially leaving her current role and her clients.  Patient says it is stressing her out and she is not sleeping very well.  She says until she makes a decision she knows she is going to struggle with that.  She says she is still taking her medications on a consistent basis and feels overall they continue to still work well for her.  She has increased how much she has been taking propranolol lately, taking it approximately 1-2 times every day.  She says she knows this is also related to the decision she needs to make involving her career.  She says she has been trying to talk this through with her  and other people, \"but so far nobody will just tell me what to do\".  She says her appetite has been sufficient and she is eating well.  She denies any issues there.  Patient denies any SI/HI, A/V hallucinations.      The following portions of the patient's history were reviewed and updated as appropriate: allergies, current medications, past family history, past medical history, past social history, past surgical history and problem list.      Current Medications:   Current Outpatient Medications " "  Medication Sig Dispense Refill    amphetamine-dextroamphetamine XR (Adderall XR) 15 MG 24 hr capsule Take 1 capsule by mouth Every Morning 30 capsule 0    Erenumab-aooe (Aimovig) 140 MG/ML auto-injector Inject 1 mL under the skin into the appropriate area as directed Every 28 (Twenty-Eight) Days. 1 mL 11    FLUoxetine (PROzac) 40 MG capsule Take 1 capsule by mouth Daily. 90 capsule 3    propranolol (INDERAL) 10 MG tablet Take 1 tablet by mouth 3 (Three) Times a Day As Needed for Anxiety 90 tablet 2    Rimegepant Sulfate (Nurtec) 75 MG tablet dispersible tablet Take 1 tablet by mouth Daily As Needed (migraine). 16 tablet 11    topiramate (TOPAMAX) 50 MG tablet Take 0.5 Tablets By Mouth twice daily for 1 Month, then increase to 1 Tablet By mouth twice daily 60 tablet 10     No current facility-administered medications for this visit.       Mental Status Exam:   Hygiene:   good  Cooperation:  Cooperative  Eye Contact:  Good  Psychomotor Behavior:  Appropriate  Affect:  Appropriate  Mood: euthymic  Speech:  Normal  Thought Process:  Goal directed  Thought Content:  Mood congruent  Suicidal:  None  Homicidal:  None  Hallucinations:  None  Delusion:  None  Memory:  Intact  Orientation:  Person, Place, Time and Situation  Reliability:  good  Insight:  Good  Judgement:  Good  Impulse Control:  Good  Physical/Medical Issues:  No        Objective   Vital Signs:   /68   Pulse 90   Ht 157.5 cm (62\")   Wt 47.2 kg (104 lb)   BMI 19.02 kg/m²     Physical Exam  Neurological:      Mental Status: She is oriented to person, place, and time. Mental status is at baseline.      Coordination: Coordination is intact.      Gait: Gait is intact.   Psychiatric:         Behavior: Behavior is cooperative.      Result Review :     The following data was reviewed by: JENNY Ross on 09/14/2023:    Data reviewed: Previous note, medication history           Assessment and Plan    Diagnoses and all orders for this visit:    1. " ADHD (attention deficit hyperactivity disorder), inattentive type (Primary)  -     amphetamine-dextroamphetamine XR (Adderall XR) 15 MG 24 hr capsule; Take 1 capsule by mouth Every Morning  Dispense: 30 capsule; Refill: 0    2. Generalized anxiety disorder    3. Mild episode of recurrent major depressive disorder           PHQ-9 Score:   PHQ-9 Total Score: 12      Depression Screening:  Patient screened positive for depression based on a PHQ-9 score of 12 on 9/14/2023. Follow-up recommendations include: Prescribed antidepressant medication treatment and Suicide Risk Assessment performed.        Tobacco Cessation:  Patient has denied an present or past tobacco use. No tobacco cessation education necessary.       Impression/Plan:  -This is a follow-up appointment.  Patient presents today and reports she has been doing well overall since her last appointment.  She does state she has been dealing with a significant amount of stress lately surrounding work as discussed in the HPI.  She says aside from that she has no significant issues or concerns today.  She is very pleased with how well she has continued to do overall.  She says she is taking her medication as prescribed and denies any adverse effects associated with them.  She feels Adderall continues to work very well for her ADHD symptoms, and prior to the job situation was not struggling with any significant anxiety.  She denies any significant depression symptoms today.  -Maintain Adderall XR 15 mg daily.  -Maintain Prozac 40 mg daily.  -Maintain propranolol 20 mg 3 times daily as needed.  -The patient is being prescribed a controlled substance as part of the treatment plan. The patient/guardian has been educated of appropriate use of the medication(s), including risks and side effects such as insomnia, headache, exacerbation of tics, nervousness, irritability, overstimulation, tremor, dizziness, anorexia or change in appetite, nausea, dry mouth, constipation,  diarrhea, weight loss, sexual dysfunction, psychotic episodes, seizures, palpitations, tachycardia, hypertension, rare activation (activation of hypomania, ellis, and/or suicidal ideations), cardiovascular adverse effects including sudden death (especially patients with pre-existing structural abnormalities often associated with a family history of cardiac disease), may slow normal growth in children, weight gain is reported but not expected, sedation is possible but activation is much more common, metabolic adversities, and overdose among others. Patient/guardian is also informed that the medication is to be used by the patient only, the medication is to be used only as directed, and the medication should not be combined with other substances unless directed by a Provider/Prescriber. The patient/guardian verbalized understanding and agreement with this in their own words, and wish to continue with current treatment plan.  Controlled substance agreement completed and filed in the patient's chart.  -Patient's LENNIE report reviewed and deemed appropriate.  Patient counseled on use of controlled substances.  UDS performed 02/27/2023.  -Reviewed available lab work.  -Schedule follow-up appointment for 14 weeks or as needed.      MEDS ORDERED DURING VISIT:  New Medications Ordered This Visit   Medications    amphetamine-dextroamphetamine XR (Adderall XR) 15 MG 24 hr capsule     Sig: Take 1 capsule by mouth Every Morning     Dispense:  30 capsule     Refill:  0         Follow Up   Return in about 14 weeks (around 12/21/2023), or if symptoms worsen or fail to improve, for Next scheduled follow up, In-Person Appt.  Patient was given instructions and counseling regarding her condition or for health maintenance advice. Please see specific information pulled into the AVS if appropriate.       TREATMENT PLAN/GOALS: Continue supportive psychotherapy efforts and medications as indicated. Treatment and medication options  discussed during today's visit. Patient acknowledged and verbally consented to continue with current treatment plan and was educated on the importance of compliance with treatment and follow-up appointments.    MEDICATION ISSUES:  Discussed medication options and treatment plan of prescribed medication as well as the risks, benefits, and side effects including potential falls, possible impaired driving and metabolic adversities among others. Patient is agreeable to call the office with any worsening of symptoms or onset of side effects. Patient is agreeable to call 911 or go to the nearest ER should he/she begin having SI/HI.          This document has been electronically signed by JENNY Desir, PMHNP-BC  September 15, 2023 10:00 EDT      Part of this note may be an electronic transcription/translation of spoken language to printed text using the Dragon Dictation System.

## 2023-09-15 RX ORDER — DEXTROAMPHETAMINE SACCHARATE, AMPHETAMINE ASPARTATE MONOHYDRATE, DEXTROAMPHETAMINE SULFATE AND AMPHETAMINE SULFATE 3.75; 3.75; 3.75; 3.75 MG/1; MG/1; MG/1; MG/1
15 CAPSULE, EXTENDED RELEASE ORAL EVERY MORNING
Qty: 30 CAPSULE | Refills: 0 | Status: SHIPPED | OUTPATIENT
Start: 2023-09-15

## 2023-10-26 ENCOUNTER — OFFICE VISIT (OUTPATIENT)
Dept: NEUROLOGY | Facility: CLINIC | Age: 33
End: 2023-10-26
Payer: COMMERCIAL

## 2023-10-26 VITALS
OXYGEN SATURATION: 99 % | BODY MASS INDEX: 19.29 KG/M2 | WEIGHT: 104.8 LBS | DIASTOLIC BLOOD PRESSURE: 62 MMHG | HEART RATE: 95 BPM | SYSTOLIC BLOOD PRESSURE: 98 MMHG | HEIGHT: 62 IN

## 2023-10-26 DIAGNOSIS — G43.109 MIGRAINE WITH AURA AND WITHOUT STATUS MIGRAINOSUS, NOT INTRACTABLE: Primary | ICD-10-CM

## 2023-10-26 PROCEDURE — 99213 OFFICE O/P EST LOW 20 MIN: CPT | Performed by: NURSE PRACTITIONER

## 2023-10-26 RX ORDER — ERENUMAB-AOOE 140 MG/ML
140 INJECTION, SOLUTION SUBCUTANEOUS
Qty: 1 ML | Refills: 0 | COMMUNITY
Start: 2023-10-26

## 2023-10-26 RX ORDER — TOPIRAMATE 50 MG/1
50 TABLET, FILM COATED ORAL 2 TIMES DAILY
Qty: 180 TABLET | Refills: 3 | Status: SHIPPED | OUTPATIENT
Start: 2023-10-26

## 2023-10-26 NOTE — PROGRESS NOTES
Neuro Office Visit      Encounter Date: 10/26/2023   Patient Name: Amie Sepulveda  : 1990   MRN: [id number]   PCP: Dr Alba  Chief Complaint:    Chief Complaint   Patient presents with    Migraine     F/U       History of Present Illness: Amie Sepulveda is a 33 y.o. female who is here today in Neurology for  migraine.      Last visit 3/14/2023 w me-cont propranolol and aimovig. Add TPM. Monitor for weight loss    Headaches  Last Aimovig injection was in July.  Taking Aimovig, TPM, propranolol, prozac and Nurtec  Days per month: 8-12 migraines a month. Will have daily mild headache  Location: Occiput , Right Eye and Left Eye, mild headaches are occipital  Quality: Sharp and Throbbing        Duration: 2 hours  Severity: 7/10.  Triggers: dehydration, cycles, disrupted sleep  Associated Symptoms: Nausea, Photophobia, Phonophobia, Dizziness and  Vision changes difficult to read on white paper  Aura: loss of central vision, gold mccarty on periphery, 20 minutes later pain will start     Ocular Migraines  Will have 1-2 ocular migraines a month. Will have same aura and vision loss with no pain, duration 30 min     Hemiplegic Migraine  2 years ago 18 while pregnant had episode of migraine seen in ED. Had parethesia bilat hand and arms, right sided weakness, confusion for 30 minutes memory loss, felt like she couldn't speak. Seen in ED had MRI and EKG. Treated with IVF. Paresthesia lasted all day. Memory loss also returned. Treated with mag and riboflavin        Abortives: imitrex, nurtec  Preventives: beta blocker, Aimovig, Qulipta not approved by ins     Does not feel the adderall triggers her headaches.                                                                             PH  Onset as a teenager. Increased frequency in last 6 years.  Could be related to stress with 4 kids and she works as a behavior therapist w autism.  2 of her children has autism as well. Full -time student for master's      MRI Brain Without Contrast (05/08/2020 15:55)-nml  PMH: ADD, covid infection 9/2021.  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  w vasectomy.  Subjective      Past Medical History:   Past Medical History:   Diagnosis Date    ADHD     Anxiety     Depression     Headache, tension-type     Migraine     2-3 migraines/week. Often with aura. No relief with previous medication.    Vision loss     With migraines       Past Surgical History:   Past Surgical History:   Procedure Laterality Date    APPENDECTOMY      DILATATION AND CURETTAGE      ULNAR NERVE TRANSPOSITION      WISDOM TOOTH EXTRACTION         Family History:   Family History   Problem Relation Age of Onset    Lung cancer Paternal Grandfather         smoker    Hypertension Father     Diabetes Maternal Grandfather     Kidney disease Paternal Grandmother     Heart disease Paternal Grandmother     Stroke Paternal Grandmother     Dementia Maternal Grandmother        Social History:   Social History     Socioeconomic History    Marital status:    Tobacco Use    Smoking status: Never    Smokeless tobacco: Never   Vaping Use    Vaping Use: Never used   Substance and Sexual Activity    Alcohol use: Not Currently     Alcohol/week: 2.0 standard drinks of alcohol     Types: 2 Glasses of wine per week     Comment: Per month    Drug use: No    Sexual activity: Defer     Partners: Male     Birth control/protection: Vasectomy       Medications:     Current Outpatient Medications:     amphetamine-dextroamphetamine XR (Adderall XR) 15 MG 24 hr capsule, Take 1 capsule by mouth Every Morning, Disp: 30 capsule, Rfl: 0    Erenumab-aooe (Aimovig) 140 MG/ML auto-injector, Inject 1 mL under the skin into the appropriate area as directed Every 28 (Twenty-Eight) Days., Disp: 1 mL, Rfl: 0    FLUoxetine (PROzac) 40 MG capsule, Take 1 capsule by mouth Daily., Disp: 90 capsule, Rfl: 3    propranolol (INDERAL) 10 MG tablet, Take 1 tablet by mouth 3 (Three) Times a  Day As Needed for Anxiety, Disp: 90 tablet, Rfl: 2    Rimegepant Sulfate (Nurtec) 75 MG tablet dispersible tablet, Take 1 tablet by mouth Daily As Needed (migraine)., Disp: 16 tablet, Rfl: 11    topiramate (TOPAMAX) 50 MG tablet, Take 1 tablet by mouth 2 (Two) Times a Day., Disp: 180 tablet, Rfl: 3    Allergies:   Allergies   Allergen Reactions    Penicillins Rash       PHQ-9 Total Score:     STEADI Fall Risk Assessment has not been completed.    Objective     Physical Exam:   Physical Exam  Neurological:      Mental Status: She is oriented to person, place, and time.      Gait: Gait is intact.      Deep Tendon Reflexes:      Reflex Scores:       Tricep reflexes are 2+ on the right side and 2+ on the left side.       Bicep reflexes are 2+ on the right side and 2+ on the left side.       Brachioradialis reflexes are 2+ on the right side and 2+ on the left side.       Patellar reflexes are 2+ on the right side and 2+ on the left side.       Achilles reflexes are 2+ on the right side and 2+ on the left side.  Psychiatric:         Speech: Speech normal.         Neurologic Exam     Mental Status   Oriented to person, place, and time.   Follows 3 step commands.   Attention: normal. Concentration: normal.   Speech: speech is normal   Level of consciousness: alert  Knowledge: consistent with education.   Normal comprehension.     Cranial Nerves     CN III, IV, VI   Right pupil: Accommodation: intact.   Left pupil: Accommodation: intact.   CN III: no CN III palsy  CN VI: no CN VI palsy  Nystagmus: none   Diplopia: none  Upgaze: normal  Downgaze: normal  Conjugate gaze: present    CN VII   Facial expression full, symmetric.     CN VIII   Hearing: intact    CN XII   CN XII normal.     Motor Exam   Muscle bulk: normal  Overall muscle tone: normal    Strength   Right biceps: 5/5  Left biceps: 5/5  Right triceps: 5/5  Left triceps: 5/5  Right interossei: 5/5  Left interossei: 5/5  Right quadriceps: 5/5  Left quadriceps:  "5/5  Right anterior tibial: 5/5  Left anterior tibial: 5/5  Right posterior tibial: 5/5  Left posterior tibial: 5/5    Sensory Exam   Light touch normal.     Gait, Coordination, and Reflexes     Gait  Gait: normal    Tremor   Resting tremor: absent  Action tremor: absent    Reflexes   Right brachioradialis: 2+  Left brachioradialis: 2+  Right biceps: 2+  Left biceps: 2+  Right triceps: 2+  Left triceps: 2+  Right patellar: 2+  Left patellar: 2+  Right achilles: 2+  Left achilles: 2+  Right : 2+  Left : 2+       Vital Signs:   Vitals:    10/26/23 1328   BP: 98/62   Pulse: 95   SpO2: 99%   Weight: 47.5 kg (104 lb 12.8 oz)   Height: 157.5 cm (62.01\")     Body mass index is 19.16 kg/m².         Assessment / Plan      Assessment/Plan:   Diagnoses and all orders for this visit:    1. Migraine with aura and without status migrainosus, not intractable (Primary)  Comments:  Cont Aimovig, propranolol, nurtec, prozac and TPM.  Cont to monitor weight.  Orders:  -     Erenumab-aooe (Aimovig) 140 MG/ML auto-injector; Inject 1 mL under the skin into the appropriate area as directed Every 28 (Twenty-Eight) Days.  Dispense: 1 mL; Refill: 0  -     topiramate (TOPAMAX) 50 MG tablet; Take 1 tablet by mouth 2 (Two) Times a Day.  Dispense: 180 tablet; Refill: 3           Patient Education:   Reviewed medications, potential side effects and signs and symptoms to report. Discussed risk versus benefits of treatment plan with patient and/or family-including medications, labs and radiology that may be ordered. Addressed questions and concerns during visit. Patient and/or family verbalized understanding and agree with plan. Instructed to call the office with any questions and report to ER with any life-threatening symptoms.     Follow Up:   Return in about 6 months (around 4/26/2024).    During this visit the following were done:  Labs Reviewed []    Labs Ordered []    Radiology Reports Reviewed []    Radiology Ordered []    PCP " Records Reviewed []    Referring Provider Records Reviewed []    ER Records Reviewed []    Hospital Records Reviewed []    History Obtained From Family []    Radiology Images Reviewed []    Other Reviewed []    Records Requested []      Francesca Frank, DNP, APRN

## 2023-10-27 ENCOUNTER — TELEPHONE (OUTPATIENT)
Dept: NEUROLOGY | Facility: CLINIC | Age: 33
End: 2023-10-27
Payer: COMMERCIAL

## 2023-10-27 NOTE — TELEPHONE ENCOUNTER
Provider: SMOOTH KINCAID APRN    Caller: ANUEL    Relationship to Patient: PHARMNALDO    Pharmacy: [facility]    Phone Number: [phone]    Reason for Call: NEEDS CLARIFICATION ON THE AIMOVIG PRESCRIPTION.   STATED SHE IS FAXING OVER A FORM WITH QUESTIONS TO THE CLINIC      PLEASE ADVISE

## 2023-11-01 ENCOUNTER — SPECIALTY PHARMACY (OUTPATIENT)
Dept: ONCOLOGY | Facility: HOSPITAL | Age: 33
End: 2023-11-01
Payer: COMMERCIAL

## 2023-11-01 ENCOUNTER — DOCUMENTATION (OUTPATIENT)
Dept: ONCOLOGY | Facility: HOSPITAL | Age: 33
End: 2023-11-01
Payer: COMMERCIAL

## 2023-11-01 DIAGNOSIS — G43.109 MIGRAINE WITH AURA AND WITHOUT STATUS MIGRAINOSUS, NOT INTRACTABLE: ICD-10-CM

## 2023-11-01 RX ORDER — RIMEGEPANT SULFATE 75 MG/75MG
75 TABLET, ORALLY DISINTEGRATING ORAL DAILY PRN
Qty: 16 TABLET | Refills: 11 | Status: SHIPPED | OUTPATIENT
Start: 2023-11-01

## 2023-11-01 RX ORDER — ERENUMAB-AOOE 140 MG/ML
140 INJECTION, SOLUTION SUBCUTANEOUS
Qty: 1 ML | Refills: 11 | Status: SHIPPED | OUTPATIENT
Start: 2023-11-01

## 2023-12-04 DIAGNOSIS — F41.1 GENERALIZED ANXIETY DISORDER: Chronic | ICD-10-CM

## 2023-12-04 DIAGNOSIS — F90.0 ADHD (ATTENTION DEFICIT HYPERACTIVITY DISORDER), INATTENTIVE TYPE: Chronic | ICD-10-CM

## 2023-12-04 RX ORDER — DEXTROAMPHETAMINE SACCHARATE, AMPHETAMINE ASPARTATE MONOHYDRATE, DEXTROAMPHETAMINE SULFATE AND AMPHETAMINE SULFATE 3.75; 3.75; 3.75; 3.75 MG/1; MG/1; MG/1; MG/1
15 CAPSULE, EXTENDED RELEASE ORAL EVERY MORNING
Qty: 30 CAPSULE | Refills: 0 | Status: SHIPPED | OUTPATIENT
Start: 2023-12-04

## 2023-12-04 RX ORDER — PROPRANOLOL HYDROCHLORIDE 10 MG/1
10 TABLET ORAL 3 TIMES DAILY PRN
Qty: 90 TABLET | Refills: 2 | Status: SHIPPED | OUTPATIENT
Start: 2023-12-04

## 2023-12-04 NOTE — TELEPHONE ENCOUNTER
Patient's LENNIE report reviewed and deemed appropriate.  Patient counseled on use of controlled substances.

## 2023-12-10 NOTE — TELEPHONE ENCOUNTER
Assumed care of pt. Telephone report received from ED RN, Heidi. Pt was updated on plan of care. AOx4. Pt pain level 0/10. Pt placed on telemetry, monitor room notified: AFIB 11. Call light, phone and personal belongings in reach. Bed strip alarm on and working properly, bed in lowest position, and locked. Bariatric bed ordered. PT placed on oxymask for increased oxygen demand but pt continues to remove it stating she can't breath with it. PT placed back on NC at 7lpm with humidifier.        Caller: Amie Sepulveda    Relationship: Self    Best call back number: [phone]    What was the call regarding: PT CALLED BACK AND HAS BEEN SCHEDULED FOR NEW PATIENT APPT W/ JENNY MEIER ON 12/12/22 (OKAYED BY HENRIK).    Do you require a callback: NO    DOCUMENTING PER HUB [employer].

## 2024-01-31 DIAGNOSIS — F90.0 ADHD (ATTENTION DEFICIT HYPERACTIVITY DISORDER), INATTENTIVE TYPE: Chronic | ICD-10-CM

## 2024-02-01 RX ORDER — DEXTROAMPHETAMINE SACCHARATE, AMPHETAMINE ASPARTATE MONOHYDRATE, DEXTROAMPHETAMINE SULFATE AND AMPHETAMINE SULFATE 3.75; 3.75; 3.75; 3.75 MG/1; MG/1; MG/1; MG/1
15 CAPSULE, EXTENDED RELEASE ORAL EVERY MORNING
Qty: 30 CAPSULE | Refills: 0 | Status: SHIPPED | OUTPATIENT
Start: 2024-02-01

## 2024-02-29 ENCOUNTER — TELEMEDICINE (OUTPATIENT)
Dept: PSYCHIATRY | Facility: CLINIC | Age: 34
End: 2024-02-29
Payer: COMMERCIAL

## 2024-02-29 DIAGNOSIS — F33.0 MILD EPISODE OF RECURRENT MAJOR DEPRESSIVE DISORDER: Chronic | ICD-10-CM

## 2024-02-29 DIAGNOSIS — F90.0 ADHD (ATTENTION DEFICIT HYPERACTIVITY DISORDER), INATTENTIVE TYPE: Primary | Chronic | ICD-10-CM

## 2024-02-29 DIAGNOSIS — Z79.899 MEDICATION MANAGEMENT: ICD-10-CM

## 2024-02-29 DIAGNOSIS — F41.1 GENERALIZED ANXIETY DISORDER: Chronic | ICD-10-CM

## 2024-02-29 PROCEDURE — 99214 OFFICE O/P EST MOD 30 MIN: CPT | Performed by: NURSE PRACTITIONER

## 2024-02-29 NOTE — PROGRESS NOTES
"This provider is located at [facility] ,[address], [address] in [place], [place],using a secure [employer] Video Visit through [employer]. Patient is being seen remotely via telehealth at their home address in [place], and stated they are in a secure environment for this session. The patient's condition being diagnosed/treated is appropriate for telemedicine. The provider identified herself as well as her credentials.   The patient, and/or patients guardian, consent to be seen remotely, and when consent is given they understand that the consent allows for patient identifiable information to be sent to a third party as needed.   They may refuse to be seen remotely at any time. The electronic data is encrypted and password protected, and the patient and/or guardian has been advised of the potential risks to privacy not withstanding such measures.    Chief Complaint  ADHD, Anxiety, and Depression      Subjective          Amie Sepulveda presents today via MyChart Video through [email or web address] for medication management of her ADHD, anxiety, depression.    History of Present Illness: Patient presents today for follow-up appointment after last been seen on 09/14/2023.  Patient says today \"I am doing pretty good overall\".  She moved into her new role at work and says it has been going well.  She reports some anxiety surrounding that but feels it is a \"healthy anxiety\".  Patient says the holiday season was very busy.  Not only did they have multiple holidays but they also have birthdays that come during that same time.  She says it goes by very quickly it went well.  Her twins are sick today but says aside from that they have been doing well overall also.  Patient says because of her anxiety she has been taking her propranolol more over the last several months and says it has been helping.  She admits she has not been as consistent with her Adderall as she should be.  She says she often " just forgets to take it until too late in the day.  When she does take it she acknowledges she functions much better during the day.  She is still taking her Prozac on a nightly basis.  She says her mood and anxiety are both well-controlled and she denies any concerns with either.  She is sleeping and eating well.  Patient denies any SI/HI, A/V hallucinations.      The following portions of the patient's history were reviewed and updated as appropriate: allergies, current medications, past family history, past medical history, past social history, past surgical history and problem list.      Current Medications:   Current Outpatient Medications   Medication Sig Dispense Refill    amphetamine-dextroamphetamine XR (Adderall XR) 15 MG 24 hr capsule Take 1 capsule by mouth Every Morning 30 capsule 0    Erenumab-aooe (Aimovig) 140 MG/ML auto-injector Inject 1 mL under the skin into the appropriate area as directed Every 28 (Twenty-Eight) Days. 1 mL 11    FLUoxetine (PROzac) 40 MG capsule Take 1 capsule by mouth Daily. 90 capsule 3    propranolol (INDERAL) 10 MG tablet Take 1 tablet by mouth 3 (Three) Times a Day As Needed for Anxiety 90 tablet 2    Rimegepant Sulfate (Nurtec) 75 MG tablet dispersible tablet Take 1 tablet by mouth Daily As Needed (migraine). Take 1 tablet at the onset of headache, Max of 75 mg/24 hours, Max of 18 tabs/30 days. 16 tablet 11    topiramate (TOPAMAX) 50 MG tablet Take 1 tablet by mouth 2 (Two) Times a Day. 180 tablet 3     No current facility-administered medications for this visit.       Mental Status Exam:   Hygiene:    MyChart video  Cooperation:  Cooperative  Eye Contact:  Good  Psychomotor Behavior:  Appropriate  Affect:  Appropriate  Mood: euthymic  Speech:  Normal  Thought Process:  Goal directed  Thought Content:  Mood congruent  Suicidal:  None  Homicidal:  None  Hallucinations:  None  Delusion:  None  Memory:  Intact  Orientation:  Person, Place, Time, and Situation  Reliability:   good  Insight:  Good  Judgement:  Good  Impulse Control:  Good  Physical/Medical Issues:  No      Objective   Vital Signs:   There were no vitals taken for this visit.    Physical Exam  Neurological:      Mental Status: She is oriented to person, place, and time. Mental status is at baseline.   Psychiatric:         Behavior: Behavior is cooperative.        Result Review :     The following data was reviewed by: JENNY Rsos on 02/29/2024:    Data reviewed : Previous note, medication history           Assessment and Plan    Diagnoses and all orders for this visit:    1. ADHD (attention deficit hyperactivity disorder), inattentive type (Primary)  -     Urine Drug Screen - Urine, Clean Catch; Future    2. Generalized anxiety disorder    3. Mild episode of recurrent major depressive disorder    4. Medication management  -     Urine Drug Screen - Urine, Clean Catch; Future         PHQ-9 Score:   PHQ-9 Total Score: (P) 9      Depression Screening:  Patient screened positive for depression based on a PHQ-9 score of 9 on 2/29/2024. Follow-up recommendations include: Prescribed antidepressant medication treatment and Suicide Risk Assessment performed.        Tobacco Cessation:  Patient has denied an present or past tobacco use. No tobacco cessation education necessary.       Impression/Plan:  -This is a follow-up appointment.  Patient presents today and says she is doing well overall.  She says she is taking her medications as prescribed, but does often forget to take her Adderall.  She is taking it approximately 50% of the time based on her last several refills.  She is taking her Prozac and propranolol consistently and feels she is doing well with them.  I did encourage her to take her Adderall on a more consistent basis and discussed strategies for how she can remember it in the mornings.  She is happy with her current medication regimen and how well she is doing at this time.  -Maintain Adderall XR 15 mg daily  for ADHD.  -Maintain Prozac 40 mg daily for anxiety and depression.  -Maintain propranolol 20 mg 3 times daily as needed for anxiety.  -The patient is being prescribed a controlled substance as part of the treatment plan. The patient/guardian has been educated of appropriate use of the medication(s), including risks and side effects such as insomnia, headache, exacerbation of tics, nervousness, irritability, overstimulation, tremor, dizziness, anorexia or change in appetite, nausea, dry mouth, constipation, diarrhea, weight loss, sexual dysfunction, psychotic episodes, seizures, palpitations, tachycardia, hypertension, rare activation (activation of hypomania, ellis, and/or suicidal ideations), cardiovascular adverse effects including sudden death (especially patients with pre-existing structural abnormalities often associated with a family history of cardiac disease), may slow normal growth in children, weight gain is reported but not expected, sedation is possible but activation is much more common, metabolic adversities, and overdose among others. Patient/guardian is also informed that the medication is to be used by the patient only, the medication is to be used only as directed, and the medication should not be combined with other substances unless directed by a Provider/Prescriber. The patient/guardian verbalized understanding and agreement with this in their own words, and wish to continue with current treatment plan.  Controlled substance agreement completed and filed in the patient's chart.  -Patient's LENNIE report reviewed and deemed appropriate.  Patient counseled on use of controlled substances.  UDS ordered for completion at [facility] outpatient lab.  -Reviewed available lab work.  -Schedule in person follow-up appointment for 4 months or as needed.    MEDS ORDERED DURING VISIT:  No orders of the defined types were placed in this encounter.        Follow Up   Return in about 4 months (around 6/29/2024), or  if symptoms worsen or fail to improve, for Next scheduled follow up, In-Person Appt.  Patient was given instructions and counseling regarding her condition or for health maintenance advice. Please see specific information pulled into the AVS if appropriate.       TREATMENT PLAN/GOALS: Continue supportive psychotherapy efforts and medications as indicated. Treatment and medication options discussed during today's visit. Patient acknowledged and verbally consented to continue with current treatment plan and was educated on the importance of compliance with treatment and follow-up appointments.    MEDICATION ISSUES:  Discussed medication options and treatment plan of prescribed medication as well as the risks, benefits, and side effects including potential falls, possible impaired driving and metabolic adversities among others. Patient is agreeable to call the office with any worsening of symptoms or onset of side effects. Patient is agreeable to call 911 or go to the nearest ER should he/she begin having SI/HI.          This document has been electronically signed by JENNY Desir, PMHNP-BC  February 29, 2024 08:46 EST      Part of this note may be an electronic transcription/translation of spoken language to printed text using the Dragon Dictation System.

## 2024-03-05 ENCOUNTER — LAB (OUTPATIENT)
Dept: LAB | Facility: HOSPITAL | Age: 34
End: 2024-03-05
Payer: COMMERCIAL

## 2024-03-05 DIAGNOSIS — Z79.899 MEDICATION MANAGEMENT: ICD-10-CM

## 2024-03-05 DIAGNOSIS — F90.0 ADHD (ATTENTION DEFICIT HYPERACTIVITY DISORDER), INATTENTIVE TYPE: Chronic | ICD-10-CM

## 2024-03-05 LAB
AMPHET+METHAMPHET UR QL: POSITIVE
AMPHETAMINES UR QL: NEGATIVE
BARBITURATES UR QL SCN: NEGATIVE
BENZODIAZ UR QL SCN: NEGATIVE
BUPRENORPHINE SERPL-MCNC: NEGATIVE NG/ML
CANNABINOIDS SERPL QL: NEGATIVE
COCAINE UR QL: NEGATIVE
METHADONE UR QL SCN: NEGATIVE
OPIATES UR QL: NEGATIVE
OXYCODONE UR QL SCN: NEGATIVE
PCP UR QL SCN: NEGATIVE
TRICYCLICS UR QL SCN: NEGATIVE

## 2024-03-05 PROCEDURE — 80306 DRUG TEST PRSMV INSTRMNT: CPT

## 2024-04-20 DIAGNOSIS — F90.0 ADHD (ATTENTION DEFICIT HYPERACTIVITY DISORDER), INATTENTIVE TYPE: Chronic | ICD-10-CM

## 2024-04-22 RX ORDER — DEXTROAMPHETAMINE SACCHARATE, AMPHETAMINE ASPARTATE MONOHYDRATE, DEXTROAMPHETAMINE SULFATE AND AMPHETAMINE SULFATE 3.75; 3.75; 3.75; 3.75 MG/1; MG/1; MG/1; MG/1
15 CAPSULE, EXTENDED RELEASE ORAL EVERY MORNING
Qty: 30 CAPSULE | Refills: 0 | Status: SHIPPED | OUTPATIENT
Start: 2024-04-22

## 2024-04-26 ENCOUNTER — OFFICE VISIT (OUTPATIENT)
Dept: NEUROLOGY | Facility: CLINIC | Age: 34
End: 2024-04-26
Payer: COMMERCIAL

## 2024-04-26 VITALS
HEIGHT: 62 IN | HEART RATE: 68 BPM | BODY MASS INDEX: 18.74 KG/M2 | OXYGEN SATURATION: 98 % | DIASTOLIC BLOOD PRESSURE: 62 MMHG | WEIGHT: 101.8 LBS | SYSTOLIC BLOOD PRESSURE: 98 MMHG

## 2024-04-26 DIAGNOSIS — G43.109 MIGRAINE WITH AURA AND WITHOUT STATUS MIGRAINOSUS, NOT INTRACTABLE: Primary | ICD-10-CM

## 2024-04-26 DIAGNOSIS — R63.4 UNINTENDED WEIGHT LOSS: ICD-10-CM

## 2024-04-26 PROCEDURE — 99214 OFFICE O/P EST MOD 30 MIN: CPT | Performed by: NURSE PRACTITIONER

## 2024-04-26 RX ORDER — AMITRIPTYLINE HYDROCHLORIDE 10 MG/1
10 TABLET, FILM COATED ORAL NIGHTLY
Qty: 30 TABLET | Refills: 5 | Status: SHIPPED | OUTPATIENT
Start: 2024-04-26

## 2024-04-26 NOTE — PROGRESS NOTES
Neuro Office Visit      Encounter Date: 2024   Patient Name: Amie Sepulveda  : 1990   MRN: [id number]   PCP: Dr Alba  Chief Complaint:    Chief Complaint   Patient presents with    Migraine with aura and without status migrainosus, not intr       History of Present Illness: Amie Sepulveda is a 33 y.o. female who is here today in Neurology for  migraine and weight loss    Last visit 10/26/2023 w me-cont aimovig, propranolol, nurtec, prozac, TPM. Monitor weight.      Headaches  Increased dosing of TPM causes weight loss, so will avoid increasing the dose of TPM  Headaches are the same at 8/month. Previously had nurtec as abortive but has not received Nurtec since 2024.    Taking Aimovig, TPM, propranolol, prozac and Nurtec  Days per month: 8-12 migraines a month. Will have daily mild headache  Location: Occiput , Right Eye and Left Eye, mild headaches are occipital  Quality: Sharp and Throbbing        Duration: 2 hours  Severity: 7/10.  Triggers: dehydration, cycles, disrupted sleep  Associated Symptoms: Nausea, Photophobia, Phonophobia, Dizziness and  Vision changes difficult to read on white paper  Aura: loss of central vision, gold mccarty on periphery, 20 minutes later pain will start     Ocular Migraines  Will have 1-2 ocular migraines a month. Will have same aura and vision loss with no pain, duration 30 min     Hemiplegic Migraine  2 years ago 18 while pregnant had episode of migraine seen in ED. Had parethesia bilat hand and arms, right sided weakness, confusion for 30 minutes memory loss, felt like she couldn't speak. Seen in ED had MRI and EKG. Treated with IVF. Paresthesia lasted all day. Memory loss also returned. Treated with mag and riboflavin        Abortives: imitrex, nurtec  Preventives: beta blocker, Aimovig, Qulipta not approved by ins, TPM, elavil     Does not feel the adderall triggers her headaches.                                                                              PH    Onset as a teenager. Increased frequency in last 6 years.  Could be related to stress with 4 kids and she works as a behavior therapist w autism.  2 of her children has autism as well. Full -time student for master's  MRI Brain Without Contrast (05/08/2020 15:55)-nml    Weight loss   Pt has difficult time maintaining her weight. Caution with TPM        PMH: ADD, covid infection 9/2021.  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  w vasectomy.  Subjective      Past Medical History:   Past Medical History:   Diagnosis Date    ADHD     Anxiety     Depression     Headache, tension-type     Migraine     2-3 migraines/week. Often with aura. No relief with previous medication.    Vision loss     With migraines       Past Surgical History:   Past Surgical History:   Procedure Laterality Date    APPENDECTOMY      DILATATION AND CURETTAGE      ULNAR NERVE TRANSPOSITION      WISDOM TOOTH EXTRACTION         Family History:   Family History   Problem Relation Age of Onset    Lung cancer Paternal Grandfather         smoker    Hypertension Father     Diabetes Maternal Grandfather     Kidney disease Paternal Grandmother     Heart disease Paternal Grandmother     Stroke Paternal Grandmother     Dementia Maternal Grandmother        Social History:   Social History     Socioeconomic History    Marital status:    Tobacco Use    Smoking status: Never    Smokeless tobacco: Never   Vaping Use    Vaping status: Never Used   Substance and Sexual Activity    Alcohol use: Not Currently     Alcohol/week: 2.0 standard drinks of alcohol     Types: 2 Glasses of wine per week     Comment: Per month    Drug use: No    Sexual activity: Defer     Partners: Male     Birth control/protection: Vasectomy       Medications:     Current Outpatient Medications:     amphetamine-dextroamphetamine XR (Adderall XR) 15 MG 24 hr capsule, Take 1 capsule by mouth Every Morning, Disp: 30 capsule, Rfl: 0    Erenumab-aooe  (Aimovig) 140 MG/ML auto-injector, Inject 1 mL under the skin into the appropriate area as directed Every 28 (Twenty-Eight) Days., Disp: 1 mL, Rfl: 11    FLUoxetine (PROzac) 40 MG capsule, Take 1 capsule by mouth Daily., Disp: 90 capsule, Rfl: 3    propranolol (INDERAL) 10 MG tablet, Take 1 tablet by mouth 3 (Three) Times a Day As Needed for Anxiety, Disp: 90 tablet, Rfl: 2    Rimegepant Sulfate (Nurtec) 75 MG tablet dispersible tablet, Take 1 tablet by mouth Daily As Needed (migraine). Take 1 tablet at the onset of headache, Max of 75 mg/24 hours, Max of 18 tabs/30 days., Disp: 16 tablet, Rfl: 11    topiramate (TOPAMAX) 50 MG tablet, Take 1 tablet by mouth 2 (Two) Times a Day., Disp: 180 tablet, Rfl: 3    amitriptyline (ELAVIL) 10 MG tablet, Take 1 tablet by mouth Every Night., Disp: 30 tablet, Rfl: 5    Rimegepant Sulfate (NURTEC) 75 MG tablet dispersible tablet, Take 1 tablet by mouth Daily As Needed (ha)., Disp: 4 tablet, Rfl: 0    Allergies:   Allergies   Allergen Reactions    Penicillins Rash       PHQ-9 Total Score:     STEADI Fall Risk Assessment has not been completed.    Objective     Physical Exam:   Physical Exam  Eyes:      Pupils: Pupils are equal, round, and reactive to light.   Neurological:      Mental Status: She is oriented to person, place, and time.      Coordination: Finger-Nose-Finger Test, Heel to Shin Test and Romberg Test normal.      Gait: Gait is intact.      Deep Tendon Reflexes:      Reflex Scores:       Tricep reflexes are 2+ on the right side and 2+ on the left side.       Bicep reflexes are 2+ on the right side and 2+ on the left side.       Brachioradialis reflexes are 2+ on the right side and 2+ on the left side.       Patellar reflexes are 2+ on the right side and 2+ on the left side.       Achilles reflexes are 2+ on the right side and 2+ on the left side.  Psychiatric:         Speech: Speech normal.         Neurologic Exam     Mental Status   Oriented to person, place, and time.  "  Follows 3 step commands.   Attention: normal. Concentration: normal.   Speech: speech is normal   Level of consciousness: alert  Knowledge: consistent with education.   Normal comprehension.     Cranial Nerves     CN III, IV, VI   Pupils are equal, round, and reactive to light.  Right pupil: Accommodation: intact.   Left pupil: Accommodation: intact.   CN III: no CN III palsy  CN VI: no CN VI palsy  Nystagmus: none   Diplopia: none  Upgaze: normal  Downgaze: normal  Conjugate gaze: present    CN VII   Facial expression full, symmetric.     CN VIII   Hearing: intact    CN XII   CN XII normal.     Motor Exam   Muscle bulk: normal  Overall muscle tone: normal    Strength   Right biceps: 5/5  Left biceps: 5/5  Right triceps: 5/5  Left triceps: 5/5  Right interossei: 5/5  Left interossei: 5/5  Right quadriceps: 5/5  Left quadriceps: 5/5  Right anterior tibial: 5/5  Left anterior tibial: 5/5  Right posterior tibial: 5/5  Left posterior tibial: 5/5    Sensory Exam   Light touch normal.     Gait, Coordination, and Reflexes     Gait  Gait: normal    Coordination   Romberg: negative  Finger to nose coordination: normal  Heel to shin coordination: normal    Tremor   Resting tremor: absent  Action tremor: absent    Reflexes   Right brachioradialis: 2+  Left brachioradialis: 2+  Right biceps: 2+  Left biceps: 2+  Right triceps: 2+  Left triceps: 2+  Right patellar: 2+  Left patellar: 2+  Right achilles: 2+  Left achilles: 2+  Right : 2+  Left : 2+       Vital Signs:   Vitals:    04/26/24 1136   BP: 98/62   Pulse: 68   SpO2: 98%   Weight: 46.2 kg (101 lb 12.8 oz)   Height: 157.5 cm (62.01\")  Comment: Noted from previous encounter     Body mass index is 18.61 kg/m².         Assessment / Plan      Assessment/Plan:   Diagnoses and all orders for this visit:    1. Migraine with aura and without status migrainosus, not intractable (Primary)  Comments:  Cont aimovig and tpm iwth propranolol. Add qulipta and elavil. Nurtec " prn  Orders:  -     Rimegepant Sulfate (NURTEC) 75 MG tablet dispersible tablet; Take 1 tablet by mouth Daily As Needed (ha).  Dispense: 4 tablet; Refill: 0  -     amitriptyline (ELAVIL) 10 MG tablet; Take 1 tablet by mouth Every Night.  Dispense: 30 tablet; Refill: 5    2. Unintended weight loss  Comments:  Avoid TPM             Patient Education:       Reviewed medications, potential side effects and signs and symptoms to report. Discussed risk versus benefits of treatment plan with patient and/or family-including medications, labs and radiology that may be ordered. Addressed questions and concerns during visit. Patient and/or family verbalized understanding and agree with plan. Instructed to call the office with any questions and report to ER with any life-threatening symptoms.     Follow Up:   Return in about 1 year (around 4/26/2025) for Recheck.    During this visit the following were done:  Labs Reviewed []    Labs Ordered []    Radiology Reports Reviewed []    Radiology Ordered []    PCP Records Reviewed []    Referring Provider Records Reviewed []    ER Records Reviewed []    Hospital Records Reviewed []    History Obtained From Family []    Radiology Images Reviewed []    Other Reviewed []    Records Requested []      Francesca Frank, LILA, APRN

## 2024-04-29 ENCOUNTER — SPECIALTY PHARMACY (OUTPATIENT)
Dept: ONCOLOGY | Facility: HOSPITAL | Age: 34
End: 2024-04-29
Payer: COMMERCIAL

## 2024-04-29 RX ORDER — ATOGEPANT 60 MG/1
60 TABLET ORAL DAILY
Qty: 30 TABLET | Refills: 11 | Status: SHIPPED | OUTPATIENT
Start: 2024-04-29

## 2024-05-01 ENCOUNTER — OFFICE VISIT (OUTPATIENT)
Dept: PSYCHIATRY | Facility: CLINIC | Age: 34
End: 2024-05-01
Payer: COMMERCIAL

## 2024-05-01 VITALS
HEIGHT: 62 IN | HEART RATE: 93 BPM | DIASTOLIC BLOOD PRESSURE: 68 MMHG | SYSTOLIC BLOOD PRESSURE: 112 MMHG | WEIGHT: 103.8 LBS | OXYGEN SATURATION: 98 % | BODY MASS INDEX: 19.1 KG/M2

## 2024-05-01 DIAGNOSIS — F90.0 ADHD (ATTENTION DEFICIT HYPERACTIVITY DISORDER), INATTENTIVE TYPE: Primary | Chronic | ICD-10-CM

## 2024-05-01 DIAGNOSIS — F33.0 MILD EPISODE OF RECURRENT MAJOR DEPRESSIVE DISORDER: Chronic | ICD-10-CM

## 2024-05-01 DIAGNOSIS — F41.1 GENERALIZED ANXIETY DISORDER: Chronic | ICD-10-CM

## 2024-05-01 PROCEDURE — 99214 OFFICE O/P EST MOD 30 MIN: CPT | Performed by: NURSE PRACTITIONER

## 2024-05-01 RX ORDER — RIMEGEPANT SULFATE 75 MG/75MG
75 TABLET, ORALLY DISINTEGRATING ORAL DAILY PRN
Qty: 16 TABLET | Refills: 11 | Status: SHIPPED | OUTPATIENT
Start: 2024-05-01

## 2024-05-01 RX ORDER — PROPRANOLOL HYDROCHLORIDE 10 MG/1
10 TABLET ORAL 3 TIMES DAILY PRN
Qty: 270 TABLET | Refills: 3 | Status: SHIPPED | OUTPATIENT
Start: 2024-05-01

## 2024-05-01 NOTE — PROGRESS NOTES
"Chief Complaint  ADHD, Anxiety, and Depression    Subjective          Amie Sepulveda presents to [facility] for medication management of her ADHD, anxiety and depression.    History of Present Illness: Patient presents today for follow-up appointment after last being seen on 02/29/2024.  Patient reports today \"I think I am doing about the same, I am alright\".  She does feel that her anxiety is still an issue at times, but she says it has been manageable.  She reports she is taking her medications consistently and does feel that they are beneficial.  Her ADHD is well-controlled with her Adderall.  Patient says she is continuing to settle into her new role at work and says she is enjoying it.  She says home life is going well and that she has stress with the kids, but aside from that is managing things.  She says they recently went to the zoo and one of her sons did not do well with the noises around him.  Aside from that they had a good time.  She says she has continued to sleep and eat about the same and denies any new issues or concerns there.  She denies any significant mood dysfunction or concerns regarding depression symptoms.  She denies any SI/HI, A/V hallucinations.      The following portions of the patient's history were reviewed and updated as appropriate: allergies, current medications, past family history, past medical history, past social history, past surgical history and problem list.      Current Medications:   Current Outpatient Medications   Medication Sig Dispense Refill    amitriptyline (ELAVIL) 10 MG tablet Take 1 tablet by mouth Every Night. 30 tablet 5    amphetamine-dextroamphetamine XR (Adderall XR) 15 MG 24 hr capsule Take 1 capsule by mouth Every Morning 30 capsule 0    Atogepant (Qulipta) 60 MG tablet Take 1 tablet by mouth Daily. 30 tablet 11    Erenumab-aooe (Aimovig) 140 MG/ML auto-injector Inject 1 mL under the skin into the appropriate area as " "directed Every 28 (Twenty-Eight) Days. 1 mL 11    FLUoxetine (PROzac) 40 MG capsule Take 1 capsule by mouth Daily. 90 capsule 3    propranolol (INDERAL) 10 MG tablet Take 1 tablet by mouth 3 (Three) Times a Day As Needed for Anxiety 270 tablet 3    Rimegepant Sulfate (Nurtec) 75 MG tablet dispersible tablet Take 1 tablet by mouth Daily As Needed for migraines. Take 1 tablet at the onset of headache, Max of 75 mg/24 hours, Max of 18 tabs/30 days. 16 tablet 11    topiramate (TOPAMAX) 50 MG tablet Take 1 tablet by mouth 2 (Two) Times a Day. 180 tablet 3     No current facility-administered medications for this visit.       Mental Status Exam:   Hygiene:   good  Cooperation:  Cooperative  Eye Contact:  Good  Psychomotor Behavior:  Appropriate  Affect:  Appropriate  Mood: euthymic  Speech:  Normal  Thought Process:  Goal directed  Thought Content:  Mood congruent  Suicidal:  None  Homicidal:  None  Hallucinations:  None  Delusion:  None  Memory:  Intact  Orientation:  Person, Place, Time, and Situation  Reliability:  good  Insight:  Good  Judgement:  Good  Impulse Control:  Good  Physical/Medical Issues:  No        Objective   Vital Signs:   /68   Pulse 93   Ht 157.5 cm (62\")   Wt 47.1 kg (103 lb 12.8 oz)   SpO2 98%   BMI 18.99 kg/m²     Physical Exam  Neurological:      Mental Status: She is oriented to person, place, and time. Mental status is at baseline.      Coordination: Coordination is intact.      Gait: Gait is intact.   Psychiatric:         Behavior: Behavior is cooperative.        Result Review :     The following data was reviewed by: JENNY Ross on 05/01/2024:    Data reviewed : Previous note, medication history           Assessment and Plan    Diagnoses and all orders for this visit:    1. ADHD (attention deficit hyperactivity disorder), inattentive type (Primary)    2. Generalized anxiety disorder  -     propranolol (INDERAL) 10 MG tablet; Take 1 tablet by mouth 3 (Three) Times a Day As " Needed for Anxiety  Dispense: 270 tablet; Refill: 3    3. Mild episode of recurrent major depressive disorder         PHQ-9 Score:   PHQ-9 Total Score: 9      Depression Screening:  Patient screened positive for depression based on a PHQ-9 score of 9 on 5/1/2024. Follow-up recommendations include: Prescribed antidepressant medication treatment and Suicide Risk Assessment performed.        Tobacco Cessation:  Patient has denied an present or past tobacco use. No tobacco cessation education necessary.       Impression/Plan:  -This is a follow-up appointment.  Patient presents today and says she feels she has been doing well overall since her last appointment.  She says she is taking her medications as prescribed and denies any adverse effects associated with them.  She feels her current regimen is working well for her overall and says she has no new issues or concerns that she needs to address today.  She continues to be happy with the progress she has made.  -Maintain Adderall XR 15 mg daily for ADHD.  -Maintain Prozac 40 mg daily for anxiety and depression.  -Maintain propranolol 20 mg 3 times daily as needed for anxiety.  -The patient is being prescribed a controlled substance as part of the treatment plan. The patient/guardian has been educated of appropriate use of the medication(s), including risks and side effects such as insomnia, headache, exacerbation of tics, nervousness, irritability, overstimulation, tremor, dizziness, anorexia or change in appetite, nausea, dry mouth, constipation, diarrhea, weight loss, sexual dysfunction, psychotic episodes, seizures, palpitations, tachycardia, hypertension, rare activation (activation of hypomania, ellis, and/or suicidal ideations), cardiovascular adverse effects including sudden death (especially patients with pre-existing structural abnormalities often associated with a family history of cardiac disease), may slow normal growth in children, weight gain is reported  but not expected, sedation is possible but activation is much more common, metabolic adversities, and overdose among others. Patient/guardian is also informed that the medication is to be used by the patient only, the medication is to be used only as directed, and the medication should not be combined with other substances unless directed by a Provider/Prescriber. The patient/guardian verbalized understanding and agreement with this in their own words, and wish to continue with current treatment plan.  Controlled substance agreement completed and filed in the patient's chart.  -Patient's LENNIE report reviewed and deemed appropriate.  Patient counseled on use of controlled substances.  UDS performed 03/05/2024.  -Reviewed available lab work.  -Schedule in person follow-up appointment for 4 months or as needed.      MEDS ORDERED DURING VISIT:  New Medications Ordered This Visit   Medications    propranolol (INDERAL) 10 MG tablet     Sig: Take 1 tablet by mouth 3 (Three) Times a Day As Needed for Anxiety     Dispense:  270 tablet     Refill:  3         Follow Up   Return in about 4 months (around 9/1/2024), or if symptoms worsen or fail to improve, for Next scheduled follow up, In-Person Appt.  Patient was given instructions and counseling regarding her condition or for health maintenance advice. Please see specific information pulled into the AVS if appropriate.       TREATMENT PLAN/GOALS: Continue supportive psychotherapy efforts and medications as indicated. Treatment and medication options discussed during today's visit. Patient acknowledged and verbally consented to continue with current treatment plan and was educated on the importance of compliance with treatment and follow-up appointments.    MEDICATION ISSUES:  Discussed medication options and treatment plan of prescribed medication as well as the risks, benefits, and side effects including potential falls, possible impaired driving and metabolic adversities  among others. Patient is agreeable to call the office with any worsening of symptoms or onset of side effects. Patient is agreeable to call 911 or go to the nearest ER should he/she begin having SI/HI.          This document has been electronically signed by JENNY Desir, PMHNP-BC  May 1, 2024 15:29 EDT      Part of this note may be an electronic transcription/translation of spoken language to printed text using the Dragon Dictation System.

## 2024-05-06 ENCOUNTER — SPECIALTY PHARMACY (OUTPATIENT)
Dept: ONCOLOGY | Facility: HOSPITAL | Age: 34
End: 2024-05-06
Payer: COMMERCIAL

## 2024-06-03 DIAGNOSIS — F41.1 GENERALIZED ANXIETY DISORDER: Chronic | ICD-10-CM

## 2024-06-03 DIAGNOSIS — F90.0 ADHD (ATTENTION DEFICIT HYPERACTIVITY DISORDER), INATTENTIVE TYPE: Chronic | ICD-10-CM

## 2024-06-03 DIAGNOSIS — F33.2 SEVERE EPISODE OF RECURRENT MAJOR DEPRESSIVE DISORDER, WITHOUT PSYCHOTIC FEATURES: Chronic | ICD-10-CM

## 2024-06-03 RX ORDER — DEXTROAMPHETAMINE SACCHARATE, AMPHETAMINE ASPARTATE MONOHYDRATE, DEXTROAMPHETAMINE SULFATE AND AMPHETAMINE SULFATE 3.75; 3.75; 3.75; 3.75 MG/1; MG/1; MG/1; MG/1
15 CAPSULE, EXTENDED RELEASE ORAL EVERY MORNING
Qty: 30 CAPSULE | Refills: 0 | Status: SHIPPED | OUTPATIENT
Start: 2024-06-03

## 2024-06-03 RX ORDER — FLUOXETINE HYDROCHLORIDE 40 MG/1
40 CAPSULE ORAL DAILY
Qty: 90 CAPSULE | Refills: 3 | Status: SHIPPED | OUTPATIENT
Start: 2024-06-03

## 2024-07-24 DIAGNOSIS — F90.0 ADHD (ATTENTION DEFICIT HYPERACTIVITY DISORDER), INATTENTIVE TYPE: Chronic | ICD-10-CM

## 2024-07-24 RX ORDER — DEXTROAMPHETAMINE SACCHARATE, AMPHETAMINE ASPARTATE MONOHYDRATE, DEXTROAMPHETAMINE SULFATE AND AMPHETAMINE SULFATE 3.75; 3.75; 3.75; 3.75 MG/1; MG/1; MG/1; MG/1
15 CAPSULE, EXTENDED RELEASE ORAL EVERY MORNING
Qty: 30 CAPSULE | Refills: 0 | Status: SHIPPED | OUTPATIENT
Start: 2024-07-24

## 2024-08-30 DIAGNOSIS — F90.0 ADHD (ATTENTION DEFICIT HYPERACTIVITY DISORDER), INATTENTIVE TYPE: Chronic | ICD-10-CM

## 2024-09-03 ENCOUNTER — OFFICE VISIT (OUTPATIENT)
Dept: PSYCHIATRY | Facility: CLINIC | Age: 34
End: 2024-09-03
Payer: COMMERCIAL

## 2024-09-03 VITALS
BODY MASS INDEX: 18.58 KG/M2 | HEART RATE: 82 BPM | WEIGHT: 101 LBS | HEIGHT: 62 IN | DIASTOLIC BLOOD PRESSURE: 68 MMHG | OXYGEN SATURATION: 98 % | SYSTOLIC BLOOD PRESSURE: 96 MMHG

## 2024-09-03 DIAGNOSIS — F41.1 GENERALIZED ANXIETY DISORDER: Chronic | ICD-10-CM

## 2024-09-03 DIAGNOSIS — F33.0 MILD EPISODE OF RECURRENT MAJOR DEPRESSIVE DISORDER: Chronic | ICD-10-CM

## 2024-09-03 DIAGNOSIS — F90.0 ADHD (ATTENTION DEFICIT HYPERACTIVITY DISORDER), INATTENTIVE TYPE: Primary | Chronic | ICD-10-CM

## 2024-09-03 PROCEDURE — 99214 OFFICE O/P EST MOD 30 MIN: CPT | Performed by: NURSE PRACTITIONER

## 2024-09-03 RX ORDER — DEXTROAMPHETAMINE SACCHARATE, AMPHETAMINE ASPARTATE MONOHYDRATE, DEXTROAMPHETAMINE SULFATE AND AMPHETAMINE SULFATE 3.75; 3.75; 3.75; 3.75 MG/1; MG/1; MG/1; MG/1
15 CAPSULE, EXTENDED RELEASE ORAL EVERY MORNING
Qty: 30 CAPSULE | Refills: 0 | OUTPATIENT
Start: 2024-09-03

## 2024-09-03 RX ORDER — LISDEXAMFETAMINE DIMESYLATE 30 MG/1
30 CAPSULE ORAL EVERY MORNING
Qty: 30 CAPSULE | Refills: 0 | Status: SHIPPED | OUTPATIENT
Start: 2024-09-03

## 2024-09-03 NOTE — PROGRESS NOTES
"Chief Complaint  ADHD, Anxiety, and Depression    Subjective              Amie Sepulveda presents to [facility] for medication management of her ADHD, anxiety and depression.    History of Present Illness: Patient presents today for follow-up appointment after last being seen on 05/01/2024.  Patient presents today and says \"I am doing pretty good\".  She says she has been staying very busy, especially with work.  She says she has been in her current role for almost 1 year now and says it can be a lot at times but she continues to enjoy it.  Patient says home life has also been going well, but she feels she has been struggling overall with her energy levels during the day.  Patient says the quality of her sleep does not appear to be good despite getting 7+ hours of sleep on a consistent basis.  She says she follows a consistent schedule, generally going to bed at around 10 PM and has her alarm set for 6 AM, but is often unable to get out of bed at that time.  Patient says she just does not feel rested in the morning.  She says she has been consistent with her medications but admits she does not take her Adderall as consistent as she probably should for her symptoms.  She says she is also interested in trying something besides Adderall.  Patient says she just does not like the way it makes her feel.  She says she gets a \"jolt\" after taking it and often feels very jittery.  Patient says aside from that she feels her medications are working well for her.  She continues to take propranolol 1-2 times a day on a consistent basis and says it still helps with her anxiety.  She denies any SI/HI, A/V hallucinations.      The following portions of the patient's history were reviewed and updated as appropriate: allergies, current medications, past family history, past medical history, past social history, past surgical history and problem list.      Current Medications:   Current Outpatient " "Medications   Medication Sig Dispense Refill    Atogepant (Qulipta) 60 MG tablet Take 1 tablet by mouth Daily. 30 tablet 11    FLUoxetine (PROzac) 40 MG capsule Take 1 capsule by mouth Daily. 90 capsule 3    propranolol (INDERAL) 10 MG tablet Take 1 tablet by mouth 3 (Three) Times a Day As Needed for Anxiety 270 tablet 3    Rimegepant Sulfate (Nurtec) 75 MG tablet dispersible tablet Take 1 tablet by mouth Daily As Needed for migraines. Take 1 tablet at the onset of headache, Max of 75 mg/24 hours, Max of 18 tabs/30 days. 16 tablet 11    topiramate (TOPAMAX) 50 MG tablet Take 1 tablet by mouth 2 (Two) Times a Day. 180 tablet 3    lisdexamfetamine (Vyvanse) 30 MG capsule Take 1 capsule by mouth Every Morning 30 capsule 0     No current facility-administered medications for this visit.       Mental Status Exam:   Hygiene:   good  Cooperation:  Cooperative  Eye Contact:  Good  Psychomotor Behavior:  Appropriate  Affect:  Appropriate  Mood: euthymic  Speech:  Normal  Thought Process:  Goal directed  Thought Content:  Mood congruent  Suicidal:  None  Homicidal:  None  Hallucinations:  None  Delusion:  None  Memory:  Intact  Orientation:  Person, Place, Time, and Situation  Reliability:  good  Insight:  Good  Judgement:  Good  Impulse Control:  Good  Physical/Medical Issues:  No        Objective   Vital Signs:   BP 96/68   Pulse 82   Ht 157.5 cm (62\")   Wt 45.8 kg (101 lb)   SpO2 98%   BMI 18.47 kg/m²     Physical Exam  Neurological:      Mental Status: She is oriented to person, place, and time. Mental status is at baseline.      Coordination: Coordination is intact.      Gait: Gait is intact.   Psychiatric:         Behavior: Behavior is cooperative.        Result Review :     The following data was reviewed by: JENNY Ross on 09/03/2024:    Data reviewed : Previous note, medication history           Assessment and Plan    Diagnoses and all orders for this visit:    1. ADHD (attention deficit hyperactivity " disorder), inattentive type (Primary)  -     lisdexamfetamine (Vyvanse) 30 MG capsule; Take 1 capsule by mouth Every Morning  Dispense: 30 capsule; Refill: 0    2. Generalized anxiety disorder    3. Mild episode of recurrent major depressive disorder           PHQ-9 Score:   PHQ-9 Total Score: 8      Depression Screening:  Patient screened positive for depression based on a PHQ-9 score of 8 on 9/3/2024. Follow-up recommendations include: Prescribed antidepressant medication treatment and Suicide Risk Assessment performed.        Tobacco Cessation:  Patient has denied an present or past tobacco use. No tobacco cessation education necessary.       Impression/Plan:  -This is a follow-up appointment.  Patient presents today and reports overall she feels she has been doing okay since she was last seen.  As discussed above she is taking her medications consistently with the exception of taking her Adderall every day.  Patient says she would like to try either a different stimulant or possibly a nonstimulant for her ADHD.  Patient says Adderall makes her feel very jittery and she does not like it.  She has previously taken Vyvanse which she did not feel was as effective at the time.  She would be open to trying it again.  She feels her combination of Prozac and propranolol continue to work well for her anxiety and depression and denies any significant concerns there.  She is also struggling with sleep.  Patient feels the quality of her sleep is poor.  I would like her to see sleep medicine for an evaluation and she is in agreement with this.  -Discontinue Adderall XR 15 mg daily for ADHD.  -Start Vyvanse 30 mg every morning for ADHD.  Patient has taken this medication in the past and denies any adverse effects associated with it.  At the time it was simply not as efficacious as Adderall.  -Maintain Prozac 40 mg daily for anxiety and depression.  -Maintain propranolol 20 mg 3 times daily as needed for anxiety.  -The patient  is being prescribed a controlled substance as part of the treatment plan. The patient/guardian has been educated of appropriate use of the medication(s), including risks and side effects such as insomnia, headache, exacerbation of tics, nervousness, irritability, overstimulation, tremor, dizziness, anorexia or change in appetite, nausea, dry mouth, constipation, diarrhea, weight loss, sexual dysfunction, psychotic episodes, seizures, palpitations, tachycardia, hypertension, rare activation (activation of hypomania, ellis, and/or suicidal ideations), cardiovascular adverse effects including sudden death (especially patients with pre-existing structural abnormalities often associated with a family history of cardiac disease), may slow normal growth in children, weight gain is reported but not expected, sedation is possible but activation is much more common, metabolic adversities, and overdose among others. Patient/guardian is also informed that the medication is to be used by the patient only, the medication is to be used only as directed, and the medication should not be combined with other substances unless directed by a Provider/Prescriber. The patient/guardian verbalized understanding and agreement with this in their own words, and wish to continue with current treatment plan.  Controlled substance agreement completed and filed in the patient's chart.  -Patient's LENNIE report reviewed and deemed appropriate.  Patient counseled on use of controlled substances.  UDS performed 03/05/2024.  -Made referral for sleep medicine.  -Reviewed available lab work.  -Schedule in person follow-up appointment for 1 month or as needed.      MEDS ORDERED DURING VISIT:  New Medications Ordered This Visit   Medications    lisdexamfetamine (Vyvanse) 30 MG capsule     Sig: Take 1 capsule by mouth Every Morning     Dispense:  30 capsule     Refill:  0         Follow Up   Return in about 1 month (around 10/3/2024), or if symptoms  worsen or fail to improve, for Next scheduled follow up, In-Person Appt.  Patient was given instructions and counseling regarding her condition or for health maintenance advice. Please see specific information pulled into the AVS if appropriate.       TREATMENT PLAN/GOALS: Continue supportive psychotherapy efforts and medications as indicated. Treatment and medication options discussed during today's visit. Patient acknowledged and verbally consented to continue with current treatment plan and was educated on the importance of compliance with treatment and follow-up appointments.    MEDICATION ISSUES:  Discussed medication options and treatment plan of prescribed medication as well as the risks, benefits, and side effects including potential falls, possible impaired driving and metabolic adversities among others. Patient is agreeable to call the office with any worsening of symptoms or onset of side effects. Patient is agreeable to call 911 or go to the nearest ER should he/she begin having SI/HI.          This document has been electronically signed by JENNY Desir, PMHNP-BC  September 3, 2024 10:02 EDT      Part of this note may be an electronic transcription/translation of spoken language to printed text using the Dragon Dictation System.

## 2024-10-22 DIAGNOSIS — F90.0 ADHD (ATTENTION DEFICIT HYPERACTIVITY DISORDER), INATTENTIVE TYPE: Chronic | ICD-10-CM

## 2024-10-23 NOTE — TELEPHONE ENCOUNTER
Can we reach out to Amie and confirm how she feels she is doing with the medication.  We switched from Adderall XR to Vyvanse at her last appointment.

## 2024-10-24 RX ORDER — LISDEXAMFETAMINE DIMESYLATE 30 MG/1
30 CAPSULE ORAL EVERY MORNING
Qty: 30 CAPSULE | Refills: 0 | Status: SHIPPED | OUTPATIENT
Start: 2024-10-24

## 2024-10-31 ENCOUNTER — TELEMEDICINE (OUTPATIENT)
Dept: PSYCHIATRY | Facility: CLINIC | Age: 34
End: 2024-10-31
Payer: COMMERCIAL

## 2024-10-31 DIAGNOSIS — F90.0 ADHD (ATTENTION DEFICIT HYPERACTIVITY DISORDER), INATTENTIVE TYPE: Primary | Chronic | ICD-10-CM

## 2024-10-31 DIAGNOSIS — F41.1 GENERALIZED ANXIETY DISORDER: Chronic | ICD-10-CM

## 2024-10-31 DIAGNOSIS — F33.0 MILD EPISODE OF RECURRENT MAJOR DEPRESSIVE DISORDER: Chronic | ICD-10-CM

## 2024-10-31 PROCEDURE — 99214 OFFICE O/P EST MOD 30 MIN: CPT | Performed by: NURSE PRACTITIONER

## 2024-10-31 NOTE — PROGRESS NOTES
"This provider is located at [facility], Behavioral [facility] ,[address], [address] in [place], [place],using a secure [employer] Video Visit through [employer]. Patient is being seen remotely via telehealth at their home address in [place], and stated they are in a secure environment for this session. The patient's condition being diagnosed/treated is appropriate for telemedicine. The provider identified herself as well as her credentials.   The patient, and/or patients guardian, consent to be seen remotely, and when consent is given they understand that the consent allows for patient identifiable information to be sent to a third party as needed.   They may refuse to be seen remotely at any time. The electronic data is encrypted and password protected, and the patient and/or guardian has been advised of the potential risks to privacy not withstanding such measures.    Chief Complaint  ADHD, Anxiety, and Depression      Subjective          Amie Sepulveda presents today via MyChart Video through [employer] for medication management of her ADHD, anxiety and depression.     History of Present Illness: Patient presents today for follow-up appointment after last being seen on 09/03/2024.  At her previous appointment she was transitioned from Adderall XR to Vyvanse.  She presents today and says \"I am doing pretty good\".  She says the switch to Vyvanse has gone well.  She says it has been impactful on her appetite so she has been focusing on eating a good breakfast and a good dinner.  She says she has not been eating as much throughout the middle part of the day since the switch.  She says she generally takes it in the morning with breakfast and also takes propranolol with it.  She says initially there does seem to be some increased anxiety, but says it is not as bad as it was with Adderall.  She says she is less jittery and says \"I also think I am really able to focus for longer periods of time\" and " feels she is not as scattered.  She says work is going well just very busy.  She has some increased anxiety surrounding her job at times but feels she is doing okay with it.  She says her depression symptoms have been about the same and denies any new concerns there.  She was also referred to sleep medicine but says she had to reschedule that appointment.  She is going to try to get it moved up earlier as it is currently not scheduled until February.  She denies any SI/HI, A/V hallucinations.      The following portions of the patient's history were reviewed and updated as appropriate: allergies, current medications, past family history, past medical history, past social history, past surgical history and problem list.      Current Medications:   Current Outpatient Medications   Medication Sig Dispense Refill    Atogepant (Qulipta) 60 MG tablet Take 1 tablet by mouth Daily. 30 tablet 11    FLUoxetine (PROzac) 40 MG capsule Take 1 capsule by mouth Daily. 90 capsule 3    lisdexamfetamine (Vyvanse) 30 MG capsule Take 1 capsule by mouth Every Morning 30 capsule 0    propranolol (INDERAL) 10 MG tablet Take 1 tablet by mouth 3 (Three) Times a Day As Needed for Anxiety 270 tablet 3    Rimegepant Sulfate (Nurtec) 75 MG tablet dispersible tablet Take 1 tablet by mouth Daily As Needed for migraines. Take 1 tablet at the onset of headache, Max of 75 mg/24 hours, Max of 18 tabs/30 days. 16 tablet 11    topiramate (TOPAMAX) 50 MG tablet Take 1 tablet by mouth 2 (Two) Times a Day. 180 tablet 3     No current facility-administered medications for this visit.       Mental Status Exam:   Hygiene:    MyChart video  Cooperation:  Cooperative  Eye Contact:  Good  Psychomotor Behavior:  Appropriate  Affect:  Appropriate  Mood: euthymic  Speech:  Normal  Thought Process:  Goal directed  Thought Content:  Mood congruent  Suicidal:  None  Homicidal:  None  Hallucinations:  None  Delusion:  None  Memory:  Intact  Orientation:  Person,  Place, Time, and Situation  Reliability:  good  Insight:  Good  Judgement:  Good  Impulse Control:  Good  Physical/Medical Issues:  No      Objective   Vital Signs:   There were no vitals taken for this visit.    Physical Exam  Neurological:      Mental Status: She is oriented to person, place, and time. Mental status is at baseline.   Psychiatric:         Behavior: Behavior is cooperative.        Result Review :     The following data was reviewed by: JENNY Ross on 10/31/2024:    Data reviewed : Previous note, medication history           Assessment and Plan    Diagnoses and all orders for this visit:    1. ADHD (attention deficit hyperactivity disorder), inattentive type (Primary)    2. Generalized anxiety disorder    3. Mild episode of recurrent major depressive disorder         PHQ-9 Score:   PHQ-9 Total Score: (Patient-Rptd) 9       Depression Screening:  Patient screened positive for depression based on a PHQ-9 score of 9 on 10/31/2024. Follow-up recommendations include: Prescribed antidepressant medication treatment and Suicide Risk Assessment performed.        Tobacco Cessation:  Patient has denied an present or past tobacco use. No tobacco cessation education necessary.       Impression/Plan:  -This is a follow-up appointment.  Patient presents today and says she has been doing well overall since she was last seen.  She says she is still taking her medications as prescribed and denies any new or significant adverse effects associated with them.  She feels the switch from Adderall to Vyvanse has worked well for her and she is happy with what she is taking now.  She reports good symptom burden control with regards to not only her ADHD but continued control of her anxiety and mood dysfunction with her other medications as well.  She says she is happy with where she is right now and denies any new concerns.  We will maintain what she is taking and follow up in 3 months.  Patient is in agreement with  this plan.  -Maintain Vyvanse 30 mg every morning for ADHD.    -Maintain Prozac 40 mg daily for anxiety and depression.  -Maintain propranolol 20 mg 3 times daily as needed for anxiety.  -The patient is being prescribed a controlled substance as part of the treatment plan. The patient/guardian has been educated of appropriate use of the medication(s), including risks and side effects such as insomnia, headache, exacerbation of tics, nervousness, irritability, overstimulation, tremor, dizziness, anorexia or change in appetite, nausea, dry mouth, constipation, diarrhea, weight loss, sexual dysfunction, psychotic episodes, seizures, palpitations, tachycardia, hypertension, rare activation (activation of hypomania, ellis, and/or suicidal ideations), cardiovascular adverse effects including sudden death (especially patients with pre-existing structural abnormalities often associated with a family history of cardiac disease), may slow normal growth in children, weight gain is reported but not expected, sedation is possible but activation is much more common, metabolic adversities, and overdose among others. Patient/guardian is also informed that the medication is to be used by the patient only, the medication is to be used only as directed, and the medication should not be combined with other substances unless directed by a Provider/Prescriber. The patient/guardian verbalized understanding and agreement with this in their own words, and wish to continue with current treatment plan.  Controlled substance agreement completed and filed in the patient's chart.  -Patient's LENNIE report reviewed and deemed appropriate.  Patient counseled on use of controlled substances.  UDS performed 03/05/2024.   -Reviewed available lab work.  -Schedule MyChart video follow-up appointment for 3 months or as needed.    MEDS ORDERED DURING VISIT:  No orders of the defined types were placed in this encounter.        Follow Up   Return in about  3 months (around 1/31/2025), or if symptoms worsen or fail to improve, for Next scheduled follow up, Video visit.  Patient was given instructions and counseling regarding her condition or for health maintenance advice. Please see specific information pulled into the AVS if appropriate.       TREATMENT PLAN/GOALS: Continue supportive psychotherapy efforts and medications as indicated. Treatment and medication options discussed during today's visit. Patient acknowledged and verbally consented to continue with current treatment plan and was educated on the importance of compliance with treatment and follow-up appointments.    MEDICATION ISSUES:  Discussed medication options and treatment plan of prescribed medication as well as the risks, benefits, and side effects including potential falls, possible impaired driving and metabolic adversities among others. Patient is agreeable to call the office with any worsening of symptoms or onset of side effects. Patient is agreeable to call 911 or go to the nearest ER should he/she begin having SI/HI.          This document has been electronically signed by JENNY Desir, PMHNP-BC  October 31, 2024 10:19 EDT      Part of this note may be an electronic transcription/translation of spoken language to printed text using the Dragon Dictation System.

## 2024-11-13 ENCOUNTER — TELEPHONE (OUTPATIENT)
Dept: INTERNAL MEDICINE | Facility: CLINIC | Age: 34
End: 2024-11-13
Payer: COMMERCIAL

## 2024-11-13 NOTE — TELEPHONE ENCOUNTER
"Relay     \"Attempted to contact patient, she is considered a new patient now since not being seen in 3 years. Attempting to correct the scheduling error. Patient did not answer, No VM set up.     Patient would need to select new PCP.\"        "

## 2024-11-22 DIAGNOSIS — F90.0 ADHD (ATTENTION DEFICIT HYPERACTIVITY DISORDER), INATTENTIVE TYPE: Chronic | ICD-10-CM

## 2024-11-22 DIAGNOSIS — G43.109 MIGRAINE WITH AURA AND WITHOUT STATUS MIGRAINOSUS, NOT INTRACTABLE: ICD-10-CM

## 2024-11-22 RX ORDER — LISDEXAMFETAMINE DIMESYLATE 30 MG/1
30 CAPSULE ORAL EVERY MORNING
Qty: 30 CAPSULE | Refills: 0 | Status: SHIPPED | OUTPATIENT
Start: 2024-11-22

## 2024-11-22 RX ORDER — TOPIRAMATE 50 MG/1
50 TABLET, FILM COATED ORAL 2 TIMES DAILY
Qty: 180 TABLET | Refills: 2 | Status: SHIPPED | OUTPATIENT
Start: 2024-11-22

## 2024-11-22 NOTE — TELEPHONE ENCOUNTER
Rx Refill Note  Requested Prescriptions     Pending Prescriptions Disp Refills    topiramate (TOPAMAX) 50 MG tablet 180 tablet 3     Sig: Take 1 tablet by mouth 2 (Two) Times a Day.      Last filled: 10/26/23 180 with 3 refills.  Last office visit with prescribing clinician: 4/26/2024      Next office visit with prescribing clinician: 4/28/2025     Sent in 180 with 2 refills.    Selena Kunz MA  11/22/24, 09:33 EST

## 2024-12-04 ENCOUNTER — OFFICE VISIT (OUTPATIENT)
Dept: INTERNAL MEDICINE | Facility: CLINIC | Age: 34
End: 2024-12-04
Payer: COMMERCIAL

## 2024-12-04 VITALS
HEART RATE: 82 BPM | TEMPERATURE: 98 F | SYSTOLIC BLOOD PRESSURE: 100 MMHG | WEIGHT: 99 LBS | HEIGHT: 62 IN | DIASTOLIC BLOOD PRESSURE: 64 MMHG | OXYGEN SATURATION: 100 % | BODY MASS INDEX: 18.22 KG/M2

## 2024-12-04 DIAGNOSIS — R79.9 ABNORMAL BLOOD CHEMISTRY: ICD-10-CM

## 2024-12-04 DIAGNOSIS — R53.82 CHRONIC FATIGUE: ICD-10-CM

## 2024-12-04 DIAGNOSIS — E80.4 GILBERT SYNDROME: ICD-10-CM

## 2024-12-04 DIAGNOSIS — Z13.220 SCREENING CHOLESTEROL LEVEL: ICD-10-CM

## 2024-12-04 DIAGNOSIS — Z00.00 ANNUAL PHYSICAL EXAM: Primary | ICD-10-CM

## 2024-12-04 PROCEDURE — 99385 PREV VISIT NEW AGE 18-39: CPT | Performed by: STUDENT IN AN ORGANIZED HEALTH CARE EDUCATION/TRAINING PROGRAM

## 2024-12-04 PROCEDURE — 90471 IMMUNIZATION ADMIN: CPT | Performed by: STUDENT IN AN ORGANIZED HEALTH CARE EDUCATION/TRAINING PROGRAM

## 2024-12-04 PROCEDURE — 90656 IIV3 VACC NO PRSV 0.5 ML IM: CPT | Performed by: STUDENT IN AN ORGANIZED HEALTH CARE EDUCATION/TRAINING PROGRAM

## 2024-12-04 NOTE — PROGRESS NOTES
Livia Sepulveda is a 34 y.o. female.     History of Present Illness  Pt presents to establish care for annual exam  Vaccines utd except covid  Pap smear due  Blood work due  Has no concerning skin lesions. Had friends recently pass from colon cancer and would like to have full physical   Has been having trouble gaining weight and with fatigue. Has sleep study already ordered.       The following portions of the patient's history were reviewed and updated as appropriate: allergies, current medications, past family history, past medical history, past social history, past surgical history, and problem list.    Review of Systems   All other systems reviewed and are negative.      Objective   Physical Exam  Vitals and nursing note reviewed.   Constitutional:       Appearance: Normal appearance.   HENT:      Head: Normocephalic and atraumatic.      Right Ear: External ear normal.      Left Ear: External ear normal.      Nose: Nose normal.      Mouth/Throat:      Mouth: Mucous membranes are moist.      Pharynx: Oropharynx is clear. No oropharyngeal exudate or posterior oropharyngeal erythema.   Eyes:      Extraocular Movements: Extraocular movements intact.      Conjunctiva/sclera: Conjunctivae normal.      Pupils: Pupils are equal, round, and reactive to light.   Cardiovascular:      Rate and Rhythm: Normal rate and regular rhythm.      Pulses: Normal pulses.      Heart sounds: Normal heart sounds.   Pulmonary:      Effort: Pulmonary effort is normal.      Breath sounds: Normal breath sounds.   Abdominal:      General: Abdomen is flat. Bowel sounds are normal.      Palpations: Abdomen is soft.   Musculoskeletal:         General: Normal range of motion.      Cervical back: Normal range of motion.   Skin:     General: Skin is warm.      Capillary Refill: Capillary refill takes less than 2 seconds.   Neurological:      General: No focal deficit present.      Mental Status: She is alert and oriented to person,  place, and time. Mental status is at baseline.   Psychiatric:         Mood and Affect: Mood normal.         Behavior: Behavior normal.         Thought Content: Thought content normal.         Judgment: Judgment normal.         Assessment & Plan   Diagnoses and all orders for this visit:    1. Annual physical exam (Primary)  -     CBC (No Diff)  -     Comprehensive Metabolic Panel  -     Hemoglobin A1c  -     Lipid Panel  -     TSH  -     Vitamin B12  -     Vitamin D,25-Hydroxy  -     Iron Profile  -     Ferritin  -     Folate  -     HERMELINDA With / DsDNA, RNP, Sjogrens A / B, Smith    2. Chronic fatigue  -     CBC (No Diff)  -     Comprehensive Metabolic Panel  -     Hemoglobin A1c  -     Lipid Panel  -     TSH  -     Vitamin B12  -     Vitamin D,25-Hydroxy  -     Iron Profile  -     Ferritin  -     Folate  -     HERMELINDA With / DsDNA, RNP, Sjogrens A / B, Smith    3. Gilbert syndrome  -     CBC (No Diff)  -     Comprehensive Metabolic Panel  -     Hemoglobin A1c  -     Lipid Panel  -     TSH  -     Vitamin B12  -     Vitamin D,25-Hydroxy  -     Iron Profile  -     Ferritin  -     Folate  -     HERMELINDA With / DsDNA, RNP, Sjogrens A / B, Rodríguez    4. Screening cholesterol level  -     CBC (No Diff)  -     Comprehensive Metabolic Panel  -     Hemoglobin A1c  -     Lipid Panel  -     TSH  -     Vitamin B12  -     Vitamin D,25-Hydroxy  -     Iron Profile  -     Ferritin  -     Folate  -     HERMELINDA With / DsDNA, RNP, Sjogrens A / B, Rodríguez    5. Abnormal blood chemistry  -     CBC (No Diff)  -     Comprehensive Metabolic Panel  -     Hemoglobin A1c  -     Lipid Panel  -     TSH  -     Vitamin B12  -     Vitamin D,25-Hydroxy  -     Iron Profile  -     Ferritin  -     Folate  -     HERMELINDA With / DsDNA, RNP, Sjogrens A / B, Rodríguez    Other orders  -     Fluzone >6mos ([id number])     Counseled on heart healthy diet  Counseled on exercise 30 minutes a day 5 days a week  Follow with optometry once a year  Follow with dentistry twice a year  Brush  and floss twice a day  Wear seatbelt while in a car  BMI is below normal parameters (malnutrition). Recommendations: Information on healthy weight added to patient's after visit summary

## 2024-12-05 DIAGNOSIS — E55.9 VITAMIN D DEFICIENCY: Primary | ICD-10-CM

## 2024-12-05 DIAGNOSIS — E53.8 FOLATE DEFICIENCY: ICD-10-CM

## 2024-12-05 LAB
25(OH)D3+25(OH)D2 SERPL-MCNC: 19.2 NG/ML (ref 30–100)
ALBUMIN SERPL-MCNC: 4.4 G/DL (ref 3.5–5.2)
ALBUMIN/GLOB SERPL: 1.6 G/DL
ALP SERPL-CCNC: 72 U/L (ref 39–117)
ALT SERPL-CCNC: 9 U/L (ref 1–33)
ANA SER QL: NEGATIVE
AST SERPL-CCNC: 15 U/L (ref 1–32)
BILIRUB SERPL-MCNC: 1.7 MG/DL (ref 0–1.2)
BUN SERPL-MCNC: 10 MG/DL (ref 6–20)
BUN/CREAT SERPL: 13.5 (ref 7–25)
CALCIUM SERPL-MCNC: 9.4 MG/DL (ref 8.6–10.5)
CHLORIDE SERPL-SCNC: 103 MMOL/L (ref 98–107)
CHOLEST SERPL-MCNC: 172 MG/DL (ref 0–200)
CO2 SERPL-SCNC: 26.4 MMOL/L (ref 22–29)
CREAT SERPL-MCNC: 0.74 MG/DL (ref 0.57–1)
EGFRCR SERPLBLD CKD-EPI 2021: 107.7 ML/MIN/1.73
ERYTHROCYTE [DISTWIDTH] IN BLOOD BY AUTOMATED COUNT: 11.9 % (ref 12.3–15.4)
FERRITIN SERPL-MCNC: 58 NG/ML (ref 13–150)
FOLATE SERPL-MCNC: 3.29 NG/ML (ref 4.78–24.2)
GLOBULIN SER CALC-MCNC: 2.8 GM/DL
GLUCOSE SERPL-MCNC: 52 MG/DL (ref 65–99)
HBA1C MFR BLD: 4.7 % (ref 4.8–5.6)
HCT VFR BLD AUTO: 41.7 % (ref 34–46.6)
HDLC SERPL-MCNC: 73 MG/DL (ref 40–60)
HGB BLD-MCNC: 13.4 G/DL (ref 12–15.9)
IRON SATN MFR SERPL: 21 % (ref 20–50)
IRON SERPL-MCNC: 71 MCG/DL (ref 37–145)
LDLC SERPL CALC-MCNC: 89 MG/DL (ref 0–100)
MCH RBC QN AUTO: 28.8 PG (ref 26.6–33)
MCHC RBC AUTO-ENTMCNC: 32.1 G/DL (ref 31.5–35.7)
MCV RBC AUTO: 89.7 FL (ref 79–97)
PLATELET # BLD AUTO: 309 10*3/MM3 (ref 140–450)
POTASSIUM SERPL-SCNC: 4.2 MMOL/L (ref 3.5–5.2)
PROT SERPL-MCNC: 7.2 G/DL (ref 6–8.5)
RBC # BLD AUTO: 4.65 10*6/MM3 (ref 3.77–5.28)
SODIUM SERPL-SCNC: 139 MMOL/L (ref 136–145)
TIBC SERPL-MCNC: 344 MCG/DL
TRIGL SERPL-MCNC: 50 MG/DL (ref 0–150)
TSH SERPL DL<=0.005 MIU/L-ACNC: 1.13 UIU/ML (ref 0.27–4.2)
UIBC SERPL-MCNC: 273 MCG/DL (ref 112–346)
VIT B12 SERPL-MCNC: 684 PG/ML (ref 211–946)
VLDLC SERPL CALC-MCNC: 10 MG/DL (ref 5–40)
WBC # BLD AUTO: 4.82 10*3/MM3 (ref 3.4–10.8)

## 2024-12-05 RX ORDER — FOLIC ACID 1 MG/1
1 TABLET ORAL DAILY
Qty: 90 TABLET | Refills: 3 | Status: SHIPPED | OUTPATIENT
Start: 2024-12-05

## 2024-12-05 RX ORDER — ERGOCALCIFEROL 1.25 MG/1
50000 CAPSULE, LIQUID FILLED ORAL WEEKLY
Qty: 12 CAPSULE | Refills: 3 | Status: SHIPPED | OUTPATIENT
Start: 2024-12-05

## 2024-12-06 ENCOUNTER — TELEPHONE (OUTPATIENT)
Dept: INTERNAL MEDICINE | Facility: CLINIC | Age: 34
End: 2024-12-06
Payer: COMMERCIAL

## 2024-12-20 DIAGNOSIS — F90.0 ADHD (ATTENTION DEFICIT HYPERACTIVITY DISORDER), INATTENTIVE TYPE: Chronic | ICD-10-CM

## 2024-12-20 RX ORDER — LISDEXAMFETAMINE DIMESYLATE 30 MG/1
30 CAPSULE ORAL EVERY MORNING
Qty: 30 CAPSULE | Refills: 0 | Status: SHIPPED | OUTPATIENT
Start: 2024-12-20

## 2025-01-31 ENCOUNTER — TELEMEDICINE (OUTPATIENT)
Dept: PSYCHIATRY | Facility: CLINIC | Age: 35
End: 2025-01-31
Payer: COMMERCIAL

## 2025-01-31 ENCOUNTER — LAB (OUTPATIENT)
Dept: LAB | Facility: HOSPITAL | Age: 35
End: 2025-01-31
Payer: COMMERCIAL

## 2025-01-31 DIAGNOSIS — F90.0 ADHD (ATTENTION DEFICIT HYPERACTIVITY DISORDER), INATTENTIVE TYPE: Chronic | ICD-10-CM

## 2025-01-31 DIAGNOSIS — F41.1 GENERALIZED ANXIETY DISORDER: Chronic | ICD-10-CM

## 2025-01-31 DIAGNOSIS — Z79.899 MEDICATION MANAGEMENT: Primary | ICD-10-CM

## 2025-01-31 DIAGNOSIS — Z79.899 MEDICATION MANAGEMENT: ICD-10-CM

## 2025-01-31 DIAGNOSIS — F33.0 MILD EPISODE OF RECURRENT MAJOR DEPRESSIVE DISORDER: Chronic | ICD-10-CM

## 2025-01-31 PROCEDURE — 80306 DRUG TEST PRSMV INSTRMNT: CPT

## 2025-01-31 RX ORDER — LISDEXAMFETAMINE DIMESYLATE 30 MG/1
30 CAPSULE ORAL EVERY MORNING
Qty: 30 CAPSULE | Refills: 0 | Status: SHIPPED | OUTPATIENT
Start: 2025-01-31

## 2025-01-31 NOTE — PROGRESS NOTES
"Mode of Visit: Video  Location of patient: -WORK-  Location of provider: [facility]  You have chosen to receive care through a telehealth visit.  The patient has signed the video visit consent form.  The visit included audio and video interaction. No technical issues occurred during this visit.      Chief Complaint  ADHD, Anxiety, and Depression      Subjective          Amie Sepulveda presents today via [employer] through [employer] for medication management of her ADHD, anxiety and depression.    History of Present Illness: Patient presents today for follow-up appointment after last being seen on 10/31/2024.  At that appointment she was maintained on her medications.  She presents today and reports \"I am doing okay\".  Patient says she had a flat tire this morning and had to get that fixed but aside from that nothing much has been going on.  She says she seems to be doing okay overall.  She is still struggling with fatigue throughout the day and says that is her primary difficulty.  She has her appointment with sleep medicine next week and says she is really looking forward to that.  She reports being consistent with her medications since she was last seen.  She still feels Vyvanse is working well for her ADHD.  It does still have a negative impact on her appetite as well as her sleep, if she takes it too late in the day, so she tries to make sure she eats a good breakfast and then takes it early.  She is also taking her Prozac and propranolol consistently.  She says she usually takes propranolol twice a day on average.  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  She denies any issues or concerns at home.  She denies any SI/HI, A/V hallucinations.      The following portions of the patient's history were reviewed and updated as appropriate: allergies, current medications, past family history, past medical history, past social history, past surgical history and " problem list.      Current Medications:   Current Outpatient Medications   Medication Sig Dispense Refill    Atogepant (Qulipta) 60 MG tablet Take 1 tablet by mouth Daily. 30 tablet 11    FLUoxetine (PROzac) 40 MG capsule Take 1 capsule by mouth Daily. 90 capsule 3    folic acid (FOLVITE) 1 MG tablet Take 1 tablet by mouth Daily. 90 tablet 3    lisdexamfetamine (Vyvanse) 30 MG capsule Take 1 capsule by mouth Every Morning 30 capsule 0    propranolol (INDERAL) 10 MG tablet Take 1 tablet by mouth 3 (Three) Times a Day As Needed for Anxiety 270 tablet 3    rimegepant sulfate ODT (Nurtec) 75 MG tablet Take 1 tablet by mouth Daily As Needed for migraines. Take 1 tablet at the onset of headache, Max of 75 mg/24 hours, Max of 18 tabs/30 days. 16 tablet 11    topiramate (TOPAMAX) 50 MG tablet Take 1 tablet by mouth 2 (Two) Times a Day. 180 tablet 2    vitamin D (ERGOCALCIFEROL) 1.25 MG (23372 UT) capsule capsule Take 1 capsule by mouth 1 (One) Time Per Week. 12 capsule 3     No current facility-administered medications for this visit.       Mental Status Exam:   Hygiene:    MyChart video  Cooperation:  Cooperative  Eye Contact:  Good  Psychomotor Behavior:  Appropriate  Affect:  Appropriate  Mood: euthymic  Speech:  Normal  Thought Process:  Goal directed  Thought Content:  Mood congruent  Suicidal:  None  Homicidal:  None  Hallucinations:  None  Delusion:  None  Memory:  Intact  Orientation:  Person, Place, Time, and Situation  Reliability:  good  Insight:  Good  Judgement:  Good  Impulse Control:  Good  Physical/Medical Issues:  No      Objective   Vital Signs:   There were no vitals taken for this visit.    Physical Exam  Neurological:      Mental Status: She is oriented to person, place, and time. Mental status is at baseline.   Psychiatric:         Behavior: Behavior is cooperative.        Result Review :     The following data was reviewed by: JENNY Ross on 01/31/2025:    Data reviewed : Previous notes,  medication history           Assessment and Plan    Diagnoses and all orders for this visit:    1. ADHD (attention deficit hyperactivity disorder), inattentive type (Primary)  -     lisdexamfetamine (Vyvanse) 30 MG capsule; Take 1 capsule by mouth Every Morning  Dispense: 30 capsule; Refill: 0    2. Generalized anxiety disorder    3. Mild episode of recurrent major depressive disorder         PHQ-9 Score:   PHQ-9 Total Score: (Patient-Rptd) 10       Depression Screening:  Patient screened positive for depression based on a PHQ-9 score of 10 on 1/31/2025. Follow-up recommendations include: Prescribed antidepressant medication treatment and Suicide Risk Assessment performed.        Tobacco Cessation:  Patient has denied an present or past tobacco use. No tobacco cessation education necessary.       Impression/Plan:  -This is a follow-up appointment.  Patient presents today and says she feels like she has been doing pretty well overall.  She says things have been about the same as they were at her last appointment.  Fatigue during the day continues to be her primary difficulty and she is looking forward to her sleep medicine appointment next week to discuss her issues.  She says she will also likely move her migraine care there as well.  She says she is taking her medications as prescribed and denies any adverse effects associated with them.  She does feel her regimen has continued to work well for her overall and says she is doing well with them.  We will maintain her medications as she has been taking them and follow up in 3 months.  She is in agreement with this plan.  -Maintain Vyvanse 30 mg every morning for ADHD.  -Maintain Prozac 40 mg daily for anxiety and depression.  -Maintain propranolol 20 mg 3 times daily as needed for anxiety.  -The patient is being prescribed a controlled substance as part of the treatment plan. The patient/guardian has been educated of appropriate use of the medication(s), including  risks and side effects such as insomnia, headache, exacerbation of tics, nervousness, irritability, overstimulation, tremor, dizziness, anorexia or change in appetite, nausea, dry mouth, constipation, diarrhea, weight loss, sexual dysfunction, psychotic episodes, seizures, palpitations, tachycardia, hypertension, rare activation (activation of hypomania, ellis, and/or suicidal ideations), cardiovascular adverse effects including sudden death (especially patients with pre-existing structural abnormalities often associated with a family history of cardiac disease), may slow normal growth in children, weight gain is reported but not expected, sedation is possible but activation is much more common, metabolic adversities, and overdose among others. Patient/guardian is also informed that the medication is to be used by the patient only, the medication is to be used only as directed, and the medication should not be combined with other substances unless directed by a Provider/Prescriber. The patient/guardian verbalized understanding and agreement with this in their own words, and wish to continue with current treatment plan.  Controlled substance agreement completed and filed in the patient's chart.  -Patient's LENNIE report reviewed and deemed appropriate.  Patient counseled on use of controlled substances.  UDS performed 03/05/2024.  -Reviewed available lab work.  -Schedule MyChart video follow-up appointment for 3 months or as needed.  Patient will do a UDS around the time of her next appointment.    MEDS ORDERED DURING VISIT:  New Medications Ordered This Visit   Medications    lisdexamfetamine (Vyvanse) 30 MG capsule     Sig: Take 1 capsule by mouth Every Morning     Dispense:  30 capsule     Refill:  0         Follow Up   Return in about 3 months (around 4/30/2025), or if symptoms worsen or fail to improve, for Next scheduled follow up, Video visit, UDS.  Patient was given instructions and counseling regarding her  condition or for health maintenance advice. Please see specific information pulled into the AVS if appropriate.       TREATMENT PLAN/GOALS: Continue supportive psychotherapy efforts and medications as indicated. Treatment and medication options discussed during today's visit. Patient acknowledged and verbally consented to continue with current treatment plan and was educated on the importance of compliance with treatment and follow-up appointments.    MEDICATION ISSUES:  Discussed medication options and treatment plan of prescribed medication as well as the risks, benefits, and side effects including potential falls, possible impaired driving and metabolic adversities among others. Patient is agreeable to call the office with any worsening of symptoms or onset of side effects. Patient is agreeable to call 911 or go to the nearest ER should he/she begin having SI/HI.          This document has been electronically signed by JENNY Desir, PMHNP-BC  January 31, 2025 10:15 EST      Part of this note may be an electronic transcription/translation of spoken language to printed text using the Dragon Dictation System.

## 2025-02-04 ENCOUNTER — OFFICE VISIT (OUTPATIENT)
Dept: NEUROLOGY | Facility: CLINIC | Age: 35
End: 2025-02-04
Payer: COMMERCIAL

## 2025-02-04 VITALS
DIASTOLIC BLOOD PRESSURE: 54 MMHG | WEIGHT: 101.8 LBS | SYSTOLIC BLOOD PRESSURE: 122 MMHG | HEART RATE: 90 BPM | OXYGEN SATURATION: 99 % | HEIGHT: 62 IN | TEMPERATURE: 97.3 F | BODY MASS INDEX: 18.74 KG/M2

## 2025-02-04 DIAGNOSIS — G47.19 EXCESSIVE DAYTIME SLEEPINESS: Primary | ICD-10-CM

## 2025-02-04 DIAGNOSIS — R53.83 FATIGUE, UNSPECIFIED TYPE: ICD-10-CM

## 2025-02-04 NOTE — PROGRESS NOTES
"     New Sleep Patient Office Visit      Patient Name: Amie Sepulveda  : 1990   MRN: [id number]     Referring Physician: Estrada Alas A*    Chief Complaint:    Chief Complaint   Patient presents with    Establish Care     Pt states she is having excessive daytime sleepiness. No matter how much sleep she gets she never feels rested.   Always assumed that this was caused by her anxiety/depression/ADHD but she's been so exhausted lately that she wanted to get checked out.        History of Present Illness: Amie Sepulveda is a 34 y.o. female who is here today to establish care with Sleep Medicine for excessive daytime sleepiness.  Sleep questionnaire reviewed- she has excessive daytime sleepiness, she never feels rested upon awakening in the mornings, it takes her 10-20 minutes to fall asleep at night, she doesn't awake during sleep, she sleeps 6-8 hours per night, she denies experiencing restless or disturbed sleep, she takes naps on a couple of days her week for 2 hours, she has never been told she snores but says her  is a hard sleeper, she denies waking up with a dry mouth, she experiences decreased concentration and decreased libido, she awakens with a headache frequently in the mornings, she has sleep attacks during the day, she has heartburn on some nights that interferes with sleep, she has pain that interferes with sleep on some nights.  She states, \"I want to be at a point where sleep isn't the only thing I think about\".  She is always thinking about when she will be able to take a nap.  She took Adderall \"for years\" and is now taking Vyvanse for her ADHD.  Additional risk factors- migraine disorder, mood disorder.   *HERMELINDA in 2024 was negative.     Wynona Score: 12    Subjective      Review of Systems:   Review of Systems   Constitutional:  Positive for fatigue.       Past Medical History:   Past Medical History:   Diagnosis Date    ADHD     Anxiety     Depression     " Headache, tension-type     Migraine     2-3 migraines/week. Often with aura. No relief with previous medication.    Vision loss     With migraines       Past Surgical History:   Past Surgical History:   Procedure Laterality Date    APPENDECTOMY      DILATATION AND CURETTAGE      ULNAR NERVE TRANSPOSITION      WISDOM TOOTH EXTRACTION         Family History:   Family History   Problem Relation Age of Onset    Lung cancer Paternal Grandfather         smoker    Hypertension Father     Diabetes Maternal Grandfather     Kidney disease Paternal Grandmother     Heart disease Paternal Grandmother     Stroke Paternal Grandmother     Dementia Paternal Grandmother     Dementia Maternal Grandmother        Social History:   Social History     Socioeconomic History    Marital status:    Tobacco Use    Smoking status: Never     Passive exposure: Never    Smokeless tobacco: Never   Vaping Use    Vaping status: Never Used   Substance and Sexual Activity    Alcohol use: Not Currently     Alcohol/week: 1.0 standard drink of alcohol     Types: 1 Glasses of wine per week     Comment: Per month    Drug use: No    Sexual activity: Defer     Partners: Male     Birth control/protection: Vasectomy       Medications:     Current Outpatient Medications:     Atogepant (Qulipta) 60 MG tablet, Take 1 tablet by mouth Daily., Disp: 30 tablet, Rfl: 11    FLUoxetine (PROzac) 40 MG capsule, Take 1 capsule by mouth Daily., Disp: 90 capsule, Rfl: 3    folic acid (FOLVITE) 1 MG tablet, Take 1 tablet by mouth Daily., Disp: 90 tablet, Rfl: 3    lisdexamfetamine (Vyvanse) 30 MG capsule, Take 1 capsule by mouth Every Morning, Disp: 30 capsule, Rfl: 0    propranolol (INDERAL) 10 MG tablet, Take 1 tablet by mouth 3 (Three) Times a Day As Needed for Anxiety, Disp: 270 tablet, Rfl: 3    rimegepant sulfate ODT (Nurtec) 75 MG tablet, Take 1 tablet by mouth Daily As Needed for migraines. Take 1 tablet at the onset of headache, Max of 75 mg/24 hours, Max of 18  "tabs/30 days., Disp: 16 tablet, Rfl: 11    vitamin D (ERGOCALCIFEROL) 1.25 MG (37395 UT) capsule capsule, Take 1 capsule by mouth 1 (One) Time Per Week., Disp: 12 capsule, Rfl: 3    Allergies:   Allergies   Allergen Reactions    Penicillins Rash       Objective     Physical Exam:  Vital Signs:   Vitals:    02/04/25 1417   BP: 122/54   BP Location: Right arm   Patient Position: Sitting   Cuff Size: Adult   Pulse: 90   Temp: 97.3 °F (36.3 °C)   TempSrc: Infrared   SpO2: 99%   Weight: 46.2 kg (101 lb 12.8 oz)   Height: 157.5 cm (62.01\")   PainSc:   8   PainLoc: Head     BMI: Body mass index is 18.61 kg/m².  Neck Circumference: 10.5 inches    Physical Exam  Vitals and nursing note reviewed.   Constitutional:       General: She is not in acute distress.     Appearance: Normal appearance. She is well-developed. She is not diaphoretic.      Comments: underweight   HENT:      Head: Normocephalic and atraumatic.      Mouth/Throat:      Comments: Mallampati 3  Eyes:      Extraocular Movements: Extraocular movements intact.      Conjunctiva/sclera: Conjunctivae normal.   Neck:      Trachea: Trachea normal.   Pulmonary:      Effort: Pulmonary effort is normal. No respiratory distress.   Musculoskeletal:         General: Normal range of motion.   Skin:     General: Skin is warm and dry.   Neurological:      Mental Status: She is alert and oriented to person, place, and time.   Psychiatric:         Mood and Affect: Mood normal.         Behavior: Behavior normal.         Thought Content: Thought content normal.         Judgment: Judgment normal.       Assessment / Plan      Assessment/Plan:   Diagnoses and all orders for this visit:    1. Excessive daytime sleepiness (Primary)  -     Home Sleep Study; Future    2. Fatigue, unspecified type  -     Home Sleep Study; Future       *Patient education on MARK, Hypersomnolence, Narcolepsy and Chronic Fatigue Syndrome provided today.   *She is agreeable to a home sleep study to rule out " significant MARK.     Follow Up:   Return in about 3 months (around 5/4/2025) for F/U Obstructive Sleep Apnea.    I have advised patient the gold standard for treatment of sleep apnea includes weight loss, use of cpap and avoidance of alcohol.  Encouraged weight loss (if applicable) with a BMI goal of 24.  I have discussed the implications of untreated significant MARK- the development of hypertension, increased risk of cardiovascular disease, increased risk of stroke, work-related issues and driving accidents. I have counseled and advised the patient to avoid driving or operating heavy/dangerous equipment if feeling drowsy.     JENNY Gao, FNP-C   Neurology and Sleep Medicine

## 2025-02-10 LAB — REF LAB TEST METHOD: NORMAL

## 2025-02-11 ENCOUNTER — PATIENT ROUNDING (BHMG ONLY) (OUTPATIENT)
Dept: NEUROLOGY | Facility: CLINIC | Age: 35
End: 2025-02-11
Payer: COMMERCIAL

## 2025-02-24 ENCOUNTER — HOSPITAL ENCOUNTER (OUTPATIENT)
Dept: SLEEP MEDICINE | Facility: HOSPITAL | Age: 35
Discharge: HOME | End: 2025-02-24
Admitting: NURSE PRACTITIONER
Payer: COMMERCIAL

## 2025-02-24 DIAGNOSIS — G47.19 EXCESSIVE DAYTIME SLEEPINESS: ICD-10-CM

## 2025-02-24 DIAGNOSIS — R53.83 FATIGUE, UNSPECIFIED TYPE: ICD-10-CM

## 2025-02-24 PROCEDURE — G0399 HOME SLEEP TEST/TYPE 3 PORTA: HCPCS

## 2025-03-31 ENCOUNTER — SPECIALTY PHARMACY (OUTPATIENT)
Dept: ONCOLOGY | Facility: HOSPITAL | Age: 35
End: 2025-03-31
Payer: COMMERCIAL

## 2025-04-01 DIAGNOSIS — F90.0 ADHD (ATTENTION DEFICIT HYPERACTIVITY DISORDER), INATTENTIVE TYPE: ICD-10-CM

## 2025-04-02 RX ORDER — LISDEXAMFETAMINE DIMESYLATE 30 MG/1
30 CAPSULE ORAL EVERY MORNING
Qty: 30 CAPSULE | Refills: 0 | Status: SHIPPED | OUTPATIENT
Start: 2025-04-02

## 2025-04-09 ENCOUNTER — TELEPHONE (OUTPATIENT)
Dept: NEUROLOGY | Facility: CLINIC | Age: 35
End: 2025-04-09

## 2025-04-09 NOTE — TELEPHONE ENCOUNTER
Caller: Amie Sepulveda    Relationship:  Self    Best call back number: [phone] (home)      PATIENT CALLED REQUESTING TO CANCEL SAME DAY APPT.    Did the patient call AFTER the start time of their scheduled appointment?  []YES  [x]NO    Was the patient's appointment rescheduled? [x]YES  []NO

## 2025-04-28 ENCOUNTER — OFFICE VISIT (OUTPATIENT)
Dept: NEUROLOGY | Facility: CLINIC | Age: 35
End: 2025-04-28
Payer: COMMERCIAL

## 2025-04-28 ENCOUNTER — SPECIALTY PHARMACY (OUTPATIENT)
Dept: ONCOLOGY | Facility: HOSPITAL | Age: 35
End: 2025-04-28
Payer: COMMERCIAL

## 2025-04-28 VITALS
SYSTOLIC BLOOD PRESSURE: 100 MMHG | HEIGHT: 62 IN | BODY MASS INDEX: 18.26 KG/M2 | WEIGHT: 99.2 LBS | OXYGEN SATURATION: 99 % | DIASTOLIC BLOOD PRESSURE: 66 MMHG | HEART RATE: 110 BPM

## 2025-04-28 DIAGNOSIS — R53.83 OTHER FATIGUE: ICD-10-CM

## 2025-04-28 DIAGNOSIS — E55.9 VITAMIN D DEFICIENCY: ICD-10-CM

## 2025-04-28 DIAGNOSIS — G43.109 MIGRAINE WITH AURA AND WITHOUT STATUS MIGRAINOSUS, NOT INTRACTABLE: Primary | ICD-10-CM

## 2025-04-28 DIAGNOSIS — E53.8 FOLATE DEFICIENCY: ICD-10-CM

## 2025-04-28 PROCEDURE — 99214 OFFICE O/P EST MOD 30 MIN: CPT | Performed by: NURSE PRACTITIONER

## 2025-04-28 RX ORDER — OXCARBAZEPINE 150 MG/1
150 TABLET, FILM COATED ORAL 2 TIMES DAILY
Qty: 60 TABLET | Refills: 5 | Status: SHIPPED | OUTPATIENT
Start: 2025-04-28

## 2025-04-28 NOTE — PROGRESS NOTES
Neuro Office Visit      Encounter Date: 2025   Patient Name: Amie Sepulveda  : 1990   MRN: [id number]   PCP: Dr Jefferson  Chief Complaint:    Chief Complaint   Patient presents with    Migraine       History of Present Illness: Amie Sepulveda is a 34 y.o. female who is here today in Neurology for  headaches, weight loss    Last visit 2024 w me-cont aimovig, propranolol. Add qulipta and elavil, use nurtec prn.   Recommend avoid use of TPM w weight loss  History of Present Illness  The patient presents for evaluation of headaches and fatigue.    Frequent headaches are reported, with the previous month being particularly challenging due to an ocular migraine. A severe migraine episode accompanied by nausea was initially mistaken for an illness contracted from her daughter. Nurtec is used as needed, but it is not consistently effective in alleviating symptoms. Proactive management of migraines is attempted by intervening at the earliest signs of an episode. Qulipta is taken without any adverse effects. Topamax was previously tried, resulting in weight loss. She is open to exploring other treatment options, including Botox. The current medication regimen includes Qulipta 60 mg, Prozac 40 mg, Inderal 10 mg, and Nurtec as needed.    Consultation with a neurologist in [place] for sleep-related issues revealed a normal sleep study, yet persistent fatigue and unrefreshing sleep continue, attributed to depression. Vitamin D supplements have been prescribed, and efforts to increase sun exposure are made. A high intake of cheese is maintained, and adequate hydration is ensured.      Fatigue   Reports feeling exhausted all the time and not feeling rested upon waking. Sometimes has difficulty sleeping despite being tired.           Headaches  Increased dosing of TPM causes weight loss, so will avoid increasing the dose of TPM  Headaches are the same at 8/month. Previously had nurtec as abortive but has  not received Nurtec since Jan 2024.     Taking Aimovig, TPM, propranolol, prozac and Nurtec  Days per month:15-18, migraines a month. Will have daily mild headache  Location: Occiput , Right Eye and Left Eye, mild headaches are occipital  Quality: Sharp and Throbbing        Duration: 2 hours  Severity: 7/10.  Triggers: dehydration, cycles, disrupted sleep  Associated Symptoms: Nausea, Photophobia, Phonophobia, Dizziness and  Vision changes difficult to read on white paper  Aura: loss of central vision, gold mccarty on periphery, 20 minutes later pain will start     Ocular Migraines  Will have 1-2 ocular migraines a month. Will have same aura and vision loss with no pain, duration 30 min     Hemiplegic Migraine  2 years ago 5/20/20 while pregnant had episode of migraine seen in ED. Had parethesia bilat hand and arms, right sided weakness, confusion for 30 minutes memory loss, felt like she couldn't speak. Seen in ED had MRI and EKG. Treated with IVF. Paresthesia lasted all day. Memory loss also returned. Treated with mag and riboflavin        Abortives: imitrex, nurtec  Preventives: beta blocker, Aimovig, Qulipta not approved by ins, TPM, elavil, Nurtec,     Does not feel the adderall triggers her headaches.                                                                             PH    Onset as a teenager. Increased frequency in last 6 years.  Could be related to stress with 4 kids and she works as a behavior therapist w autism.  2 of her children has autism as well. Full -time student for master's  MRI Brain Without Contrast (05/08/2020 15:55)-nml     Weight loss   Pt has difficult time maintaining her weight. Caution with TPM    Fatigue   Vit D def and Folate def           PMH: ADD, covid infection 9/2021.  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  w vasectomy.  Subjective      Past Medical History:   Past Medical History:   Diagnosis Date    ADHD     Anxiety     Depression     Headache,  tension-type     Migraine     2-3 migraines/week. Often with aura. No relief with previous medication.    Vision loss     With migraines       Past Surgical History:   Past Surgical History:   Procedure Laterality Date    APPENDECTOMY      DILATATION AND CURETTAGE      ULNAR NERVE TRANSPOSITION      WISDOM TOOTH EXTRACTION         Family History:   Family History   Problem Relation Age of Onset    Lung cancer Paternal Grandfather         smoker    Hypertension Father     Diabetes Maternal Grandfather     Kidney disease Paternal Grandmother     Heart disease Paternal Grandmother     Stroke Paternal Grandmother     Dementia Paternal Grandmother     Dementia Maternal Grandmother        Social History:   Social History     Socioeconomic History    Marital status:    Tobacco Use    Smoking status: Never     Passive exposure: Never    Smokeless tobacco: Never   Vaping Use    Vaping status: Never Used   Substance and Sexual Activity    Alcohol use: Not Currently     Alcohol/week: 1.0 standard drink of alcohol     Types: 1 Glasses of wine per week     Comment: Per month    Drug use: No    Sexual activity: Defer     Partners: Male     Birth control/protection: Vasectomy       Medications:     Current Outpatient Medications:     Atogepant (Qulipta) 60 MG tablet, Take 1 tablet by mouth Daily., Disp: 30 tablet, Rfl: 11    FLUoxetine (PROzac) 40 MG capsule, Take 1 capsule by mouth Daily., Disp: 90 capsule, Rfl: 3    folic acid (FOLVITE) 1 MG tablet, Take 1 tablet by mouth Daily., Disp: 90 tablet, Rfl: 3    lisdexamfetamine (Vyvanse) 30 MG capsule, Take 1 capsule by mouth Every Morning, Disp: 30 capsule, Rfl: 0    propranolol (INDERAL) 10 MG tablet, Take 1 tablet by mouth 3 (Three) Times a Day As Needed for Anxiety, Disp: 270 tablet, Rfl: 3    vitamin D (ERGOCALCIFEROL) 1.25 MG (30587 UT) capsule capsule, Take 1 capsule by mouth 1 (One) Time Per Week., Disp: 12 capsule, Rfl: 3    OXcarbazepine (TRILEPTAL) 150 MG tablet,  Take 1 tablet by mouth 2 (Two) Times a Day., Disp: 60 tablet, Rfl: 5    rimegepant sulfate ODT (Nurtec) 75 MG disintegrating tablet, Take 1 tablet by mouth Daily As Needed for migraines. Take 1 tablet at the onset of headache, Max of 75 mg/24 hours, Max of 18 tabs/30 days., Disp: 8 tablet, Rfl: 11    Allergies:   Allergies   Allergen Reactions    Penicillins Rash       PHQ-9 Total Score:     STEADI Fall Risk Assessment has not been completed.    Objective     Physical Exam:   Physical Exam  Eyes:      Extraocular Movements: No nystagmus.   Neurological:      Motor: Motor strength is normal.     Coordination: Coordination is intact.      Deep Tendon Reflexes:      Reflex Scores:       Bicep reflexes are 2+ on the right side and 2+ on the left side.       Brachioradialis reflexes are 2+ on the right side and 2+ on the left side.       Patellar reflexes are 2+ on the right side and 2+ on the left side.       Achilles reflexes are 2+ on the right side and 2+ on the left side.  Psychiatric:         Speech: Speech normal.         Neurological Exam  Mental Status  Awake, alert and oriented to person, place and time. Recent and remote memory are intact. Speech is normal. Follows complex commands. Attention and concentration are normal. Fund of knowledge is appropriate for level of education.    Cranial Nerves  CN III, IV, VI: No nystagmus. Normal saccades. Normal smooth pursuit.   Right pupil: Round.   Left pupil: Round.  CN V: Facial sensation is normal.  CN VII: Full and symmetric facial movement.    Motor  Normal muscle bulk throughout. No fasciculations present. Normal muscle tone. No abnormal involuntary movements. Strength is 5/5 throughout all four extremities.    Sensory  Sensation is intact to light touch, pinprick, vibration and proprioception in all four extremities.    Reflexes                                            Right                      Left  Brachioradialis                    2+                         " 2+  Biceps                                 2+                         2+  Patellar                                2+                         2+  Achilles                                2+                         2+    Coordination    Finger-to-nose, rapid alternating movements and heel-to-shin normal bilaterally without dysmetria.    Gait  Normal casual, toe, heel and tandem gait.     Physical Exam  Cranial Nerve Examination    CN III IV VI: Extraocular movements intact.    Motor Examination    Strength: Strength 5/5 in upper and lower extremities bilaterally.    Vital Signs: Heart rate elevated, blood pressure low.      Vital Signs:   Vitals:    04/28/25 1032   BP: 100/66   Pulse: 110   SpO2: 99%   Weight: 45 kg (99 lb 3.2 oz)   Height: 157.5 cm (62.01\")     Body mass index is 18.14 kg/m².         Assessment / Plan      Assessment/Plan:   Diagnoses and all orders for this visit:    1. Migraine with aura and without status migrainosus, not intractable (Primary)  Comments:  Start OXC with Qulipta. Cont Nurtec as abortive  Cont propranolol and prozac. Consider Botox if not effective.  Orders:  -     OXcarbazepine (TRILEPTAL) 150 MG tablet; Take 1 tablet by mouth 2 (Two) Times a Day.  Dispense: 60 tablet; Refill: 5    2. Other fatigue  Comments:  Check labs  Orders:  -     Vitamin B12 & Folate; Future  -     Vitamin D,25-Hydroxy; Future    3. Vitamin D deficiency  -     Vitamin D,25-Hydroxy; Future    4. Folate deficiency  -     Vitamin B12 & Folate; Future       Assessment & Plan  1. Headaches.  She experiences frequent headaches, with 18 episodes last month and approximately 15 this month. Nurtec helps if taken early, but insurance only covers 8 doses instead of 16. Her heart rate was slightly elevated today. Blood pressure was slightly low. She will start oxcarbazepine 150 mg twice daily for 6 to 8 weeks. If ineffective, Botox injections will be considered. She will continue Qulipta and use Nurtec as an abortive " treatment.    2. Fatigue.  Her HERMELINDA is negative, but she has folate deficiency anemia and low vitamin D levels. She is advised to take folic acid supplements and ensure adequate calcium intake to help absorb vitamin D. B12, folate, and vitamin D levels will be checked today.    Follow-up  The patient will follow up in 3 months.        Patient Education:       Reviewed medications, potential side effects and signs and symptoms to report. Discussed risk versus benefits of treatment plan with patient and/or family-including medications, labs and radiology that may be ordered. Addressed questions and concerns during visit. Patient and/or family verbalized understanding and agree with plan. Instructed to call the office with any questions and report to ER with any life-threatening symptoms.     Follow Up:   Return in about 3 months (around 7/28/2025) for Recheck.    During this visit the following were done:  Labs Reviewed []    Labs Ordered []    Radiology Reports Reviewed []    Radiology Ordered []    PCP Records Reviewed []    Referring Provider Records Reviewed []    ER Records Reviewed []    Hospital Records Reviewed []    History Obtained From Family []    Radiology Images Reviewed []    Other Reviewed []    Records Requested []      Patient or patient representative verbalized consent for the use of Ambient Listening during the visit with  Francesca Frank DNP, JENNY for chart documentation. 4/28/2025  08:30 EDT      Francesca Frank DNP, APRN

## 2025-04-30 ENCOUNTER — SPECIALTY PHARMACY (OUTPATIENT)
Dept: NEUROLOGY | Facility: CLINIC | Age: 35
End: 2025-04-30
Payer: COMMERCIAL

## 2025-05-15 ENCOUNTER — SPECIALTY PHARMACY (OUTPATIENT)
Dept: ONCOLOGY | Facility: HOSPITAL | Age: 35
End: 2025-05-15
Payer: COMMERCIAL

## 2025-05-22 ENCOUNTER — TELEMEDICINE (OUTPATIENT)
Dept: PSYCHIATRY | Facility: CLINIC | Age: 35
End: 2025-05-22
Payer: COMMERCIAL

## 2025-05-22 DIAGNOSIS — F33.0 MILD EPISODE OF RECURRENT MAJOR DEPRESSIVE DISORDER: Chronic | ICD-10-CM

## 2025-05-22 DIAGNOSIS — F41.1 GENERALIZED ANXIETY DISORDER: Chronic | ICD-10-CM

## 2025-05-22 DIAGNOSIS — F90.0 ADHD (ATTENTION DEFICIT HYPERACTIVITY DISORDER), INATTENTIVE TYPE: Primary | Chronic | ICD-10-CM

## 2025-05-22 PROCEDURE — 99214 OFFICE O/P EST MOD 30 MIN: CPT | Performed by: NURSE PRACTITIONER

## 2025-05-22 RX ORDER — LISDEXAMFETAMINE DIMESYLATE 30 MG/1
30 CAPSULE ORAL EVERY MORNING
Qty: 30 CAPSULE | Refills: 0 | Status: SHIPPED | OUTPATIENT
Start: 2025-05-22

## 2025-05-22 NOTE — PROGRESS NOTES
"Mode of Visit: Video  Location of patient: -WORK-  Location of provider: +HOME+  You have chosen to receive care through a telehealth visit.  The patient has signed the video visit consent form.  The visit included audio and video interaction. No technical issues occurred during this visit.      Chief Complaint  ADHD, Anxiety, and Depression      Subjective              Amie Sepulveda presents today via [facility] through [employer] for medication management of her ADHD, anxiety and depression.    History of Present Illness: Patient presents today for follow-up appointment after last being seen on 01/31/2025.  At that appointment she was maintained on her medications.  Patient presents today and reports \"I am doing okay\".  Patient says she has been staying very busy with work recently.  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  Patient says she likes the other aspects of her job.  Since her last appointment she has continued to struggle with migraines.  She says she had her sleep study which did not show anything.  She feels her continued difficulties with sleep and fatigue are likely related to mood, anxiety and her physical health difficulties.  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  Patient feels her medications are still working well for her.  She says her appetite has been okay, about the same as it usually is.  She does still struggle with gaining weight.  Sleep has been okay \"about the same as well\".  She does continue to struggle with feeling fatigued and tired throughout the day no matter how much sleep she gets.  She denies any SI/HI, A/V hallucinations.    The following portions of the patient's history were reviewed and updated as appropriate: allergies, current " medications, past family history, past medical history, past social history, past surgical history and problem list.      Current Medications:   Current Outpatient Medications   Medication Sig Dispense Refill    Atogepant (Qulipta) 60 MG tablet Take 1 tablet by mouth Daily. 30 tablet 11    FLUoxetine (PROzac) 40 MG capsule Take 1 capsule by mouth Daily. 90 capsule 3    folic acid (FOLVITE) 1 MG tablet Take 1 tablet by mouth Daily. 90 tablet 3    lisdexamfetamine (Vyvanse) 30 MG capsule Take 1 capsule by mouth Every Morning 30 capsule 0    propranolol (INDERAL) 10 MG tablet Take 1 tablet by mouth 3 (Three) Times a Day As Needed for Anxiety 270 tablet 3    rimegepant sulfate ODT (Nurtec) 75 MG disintegrating tablet Take 1 tablet by mouth Daily As Needed for migraines. Take 1 tablet at the onset of headache, Max of 75 mg/24 hours, Max of 18 tabs/30 days. 8 tablet 11    vitamin D (ERGOCALCIFEROL) 1.25 MG (84716 UT) capsule capsule Take 1 capsule by mouth 1 (One) Time Per Week. 12 capsule 3    OXcarbazepine (TRILEPTAL) 150 MG tablet Take 1 tablet by mouth 2 (Two) Times a Day. (Patient not taking: Reported on 5/22/2025) 60 tablet 5     No current facility-administered medications for this visit.       Mental Status Exam:   Hygiene:    MyChart video  Cooperation:  Cooperative  Eye Contact:  Good  Psychomotor Behavior:  Appropriate  Affect:  Appropriate  Mood: euthymic  Speech:  Normal  Thought Process:  Goal directed  Thought Content:  Mood congruent  Suicidal:  None  Homicidal:  None  Hallucinations:  None  Delusion:  None  Memory:  Intact  Orientation:  Person, Place, Time, and Situation  Reliability:  good  Insight:  Good  Judgement:  Good  Impulse Control:  Good  Physical/Medical Issues:  No      Objective   Vital Signs:   There were no vitals taken for this visit.    Physical Exam  Neurological:      Mental Status: She is oriented to person, place, and time. Mental status is at baseline.   Psychiatric:          Behavior: Behavior is cooperative.        Result Review :     The following data was reviewed by: JENNY Ross on 05/22/2025:    Data reviewed : Previous notes, medication history           Assessment and Plan    Diagnoses and all orders for this visit:    1. ADHD (attention deficit hyperactivity disorder), inattentive type (Primary)  -     lisdexamfetamine (Vyvanse) 30 MG capsule; Take 1 capsule by mouth Every Morning  Dispense: 30 capsule; Refill: 0    2. Generalized anxiety disorder    3. Mild episode of recurrent major depressive disorder           PHQ-9 Score:   PHQ-9 Total Score: (Patient-Rptd) 10       Depression Screening:  Patient screened positive for depression based on a PHQ-9 score of 10 on 5/22/2025. Follow-up recommendations include: Prescribed antidepressant medication treatment and Suicide Risk Assessment performed.        Tobacco Cessation:  Patient has denied an present or past tobacco use. No tobacco cessation education necessary.       Impression/Plan:  -This is a follow-up appointment.  Patient presents today and reports she has been doing fairly well overall since she was last seen.  She says she is still taking her medications as prescribed and denies any adverse effects associated with them.  She feels Prozac and propranolol continue to work well for mood dysfunction and her anxiety.  Vyvanse continues to help control her ADHD symptoms.  Patient says she is able to focus and stay on task, especially at work and accomplish what she needs to do.  She does say she struggles still with taking it every day because at times it seems like it makes her anxiety worse.  Advised patient she should try to take it every day, but at least throughout the work week.  She may look at trying to take the weekends off.  Otherwise she feels her medications are working well and she does not have any issues or concerns.  She is still happy with where she is and how things are going.  We will maintain her  medications as she has been taking them and follow up in 3 months.  She is in agreement with this plan.  -Maintain Vyvanse 30 mg every morning for ADHD.  -Maintain Prozac 40 mg daily for anxiety and depression.  -Maintain propranolol 20 mg 3 times daily as needed for anxiety.  -The patient is being prescribed a controlled substance as part of the treatment plan. The patient/guardian has been educated of appropriate use of the medication(s), including risks and side effects such as insomnia, headache, exacerbation of tics, nervousness, irritability, overstimulation, tremor, dizziness, anorexia or change in appetite, nausea, dry mouth, constipation, diarrhea, weight loss, sexual dysfunction, psychotic episodes, seizures, palpitations, tachycardia, hypertension, rare activation (activation of hypomania, ellis, and/or suicidal ideations), cardiovascular adverse effects including sudden death (especially patients with pre-existing structural abnormalities often associated with a family history of cardiac disease), may slow normal growth in children, weight gain is reported but not expected, sedation is possible but activation is much more common, metabolic adversities, and overdose among others. Patient/guardian is also informed that the medication is to be used by the patient only, the medication is to be used only as directed, and the medication should not be combined with other substances unless directed by a Provider/Prescriber. The patient/guardian verbalized understanding and agreement with this in their own words, and wish to continue with current treatment plan.  Controlled substance agreement completed and filed in the patient's chart.  -Patient's LENNIE report reviewed and deemed appropriate.  Patient counseled on use of controlled substances.  UDS performed 03/05/2024.  -Reviewed available lab work.  -Schedule in person follow-up appointment for 3 months or as needed.  UDS at follow-up.    MEDS ORDERED DURING  VISIT:  New Medications Ordered This Visit   Medications    lisdexamfetamine (Vyvanse) 30 MG capsule     Sig: Take 1 capsule by mouth Every Morning     Dispense:  30 capsule     Refill:  0         Follow Up   Return in about 3 months (around 8/22/2025), or if symptoms worsen or fail to improve, for Next scheduled follow up, In-Person Appt, UDS.  Patient was given instructions and counseling regarding her condition or for health maintenance advice. Please see specific information pulled into the AVS if appropriate.       TREATMENT PLAN/GOALS: Continue supportive psychotherapy efforts and medications as indicated. Treatment and medication options discussed during today's visit. Patient acknowledged and verbally consented to continue with current treatment plan and was educated on the importance of compliance with treatment and follow-up appointments.    MEDICATION ISSUES:  Discussed medication options and treatment plan of prescribed medication as well as the risks, benefits, and side effects including potential falls, possible impaired driving and metabolic adversities among others. Patient is agreeable to call the office with any worsening of symptoms or onset of side effects. Patient is agreeable to call 911 or go to the nearest ER should he/she begin having SI/HI.          This document has been electronically signed by JENNY Desir, PMHNP-BC  May 22, 2025 13:45 EDT      Part of this note may be an electronic transcription/translation of spoken language to printed text using the Dragon Dictation System.

## 2025-06-06 DIAGNOSIS — F33.2 SEVERE EPISODE OF RECURRENT MAJOR DEPRESSIVE DISORDER, WITHOUT PSYCHOTIC FEATURES: Chronic | ICD-10-CM

## 2025-06-06 DIAGNOSIS — F41.1 GENERALIZED ANXIETY DISORDER: Chronic | ICD-10-CM

## 2025-06-06 RX ORDER — PROPRANOLOL HYDROCHLORIDE 10 MG/1
10 TABLET ORAL 3 TIMES DAILY PRN
Qty: 270 TABLET | Refills: 3 | Status: SHIPPED | OUTPATIENT
Start: 2025-06-06

## 2025-06-06 RX ORDER — FLUOXETINE HYDROCHLORIDE 40 MG/1
40 CAPSULE ORAL DAILY
Qty: 90 CAPSULE | Refills: 3 | Status: SHIPPED | OUTPATIENT
Start: 2025-06-06

## 2025-07-25 ENCOUNTER — LAB (OUTPATIENT)
Dept: LAB | Facility: HOSPITAL | Age: 35
End: 2025-07-25
Payer: COMMERCIAL

## 2025-07-25 ENCOUNTER — OFFICE VISIT (OUTPATIENT)
Dept: NEUROLOGY | Facility: CLINIC | Age: 35
End: 2025-07-25
Payer: COMMERCIAL

## 2025-07-25 VITALS
WEIGHT: 99.6 LBS | BODY MASS INDEX: 18.33 KG/M2 | HEART RATE: 86 BPM | DIASTOLIC BLOOD PRESSURE: 66 MMHG | SYSTOLIC BLOOD PRESSURE: 102 MMHG | HEIGHT: 62 IN | OXYGEN SATURATION: 99 %

## 2025-07-25 DIAGNOSIS — R53.83 OTHER FATIGUE: ICD-10-CM

## 2025-07-25 DIAGNOSIS — G43.109 MIGRAINE WITH AURA AND WITHOUT STATUS MIGRAINOSUS, NOT INTRACTABLE: Primary | ICD-10-CM

## 2025-07-25 DIAGNOSIS — E55.9 VITAMIN D DEFICIENCY: ICD-10-CM

## 2025-07-25 DIAGNOSIS — F90.0 ADHD (ATTENTION DEFICIT HYPERACTIVITY DISORDER), INATTENTIVE TYPE: Chronic | ICD-10-CM

## 2025-07-25 PROBLEM — F32.9 MAJOR DEPRESSIVE DISORDER, SINGLE EPISODE, UNSPECIFIED: Status: ACTIVE | Noted: 2025-07-25

## 2025-07-25 LAB
25(OH)D3 SERPL-MCNC: 37.9 NG/ML (ref 30–100)
FOLATE SERPL-MCNC: 7.77 NG/ML (ref 4.78–24.2)
VIT B12 BLD-MCNC: 776 PG/ML (ref 211–946)

## 2025-07-25 PROCEDURE — 99214 OFFICE O/P EST MOD 30 MIN: CPT | Performed by: NURSE PRACTITIONER

## 2025-07-25 PROCEDURE — 82607 VITAMIN B-12: CPT

## 2025-07-25 PROCEDURE — 36415 COLL VENOUS BLD VENIPUNCTURE: CPT

## 2025-07-25 PROCEDURE — 82746 ASSAY OF FOLIC ACID SERUM: CPT

## 2025-07-25 PROCEDURE — 82306 VITAMIN D 25 HYDROXY: CPT

## 2025-07-25 RX ORDER — LISDEXAMFETAMINE DIMESYLATE 30 MG/1
30 CAPSULE ORAL EVERY MORNING
Qty: 30 CAPSULE | Refills: 0 | Status: SHIPPED | OUTPATIENT
Start: 2025-07-25

## 2025-07-25 NOTE — PROGRESS NOTES
Neuro Office Visit      Encounter Date: 2025   Patient Name: Amie Sepulveda  : 1990   MRN: [id number]   PCP: Stephanie Jefferson DO  Chief Complaint:    Chief Complaint   Patient presents with    Migraine       History of Present Illness: Amie Sepulveda is a 34 y.o. female who is here today in Neurology for  migraine, fatigue, vit D def, folate def    Last visit 2025-start oxc w qulipta, cont nurtec, propranolol and prozac. Consider botox, check labs  Labs: vit D, vit B12- not drawn  History of Present Illness  The patient presents for evaluation of persistent headaches.  Headaches  Headaches have remained consistent, with no significant changes. She experiences headaches upon waking up and throughout the day, even after going to bed. Efforts to increase water intake and physical activity have been made, consuming approximately 64 ounces of water daily, which has resulted in frequent bathroom visits. Uncertainty exists regarding the current status of her eye examination. Blue light glasses have been purchased due to extensive computer use at work. Headache frequency exceeds 15 days per month, with each episode lasting more than 6 months and varying from moderate to severe intensity. Her insurance will be changing on 2025. She is currently taking vitamin D and folic acid supplements.        Frequent headaches are reported, with the previous month being particularly challenging due to an ocular migraine. A severe migraine episode accompanied by nausea was initially mistaken for an illness contracted from her daughter. Nurtec is used as needed, but it is not consistently effective in alleviating symptoms. Proactive management of migraines is attempted by intervening at the earliest signs of an episode. Qulipta is taken without any adverse effects. Topamax was previously tried, resulting in weight loss. She is open to exploring other treatment options, including Botox. The current medication  regimen includes Qulipta 60 mg, Prozac 40 mg, Inderal 10 mg, and Nurtec as needed.     Consultation with a neurologist in [place] for sleep-related issues revealed a normal sleep study, yet persistent fatigue and unrefreshing sleep continue, attributed to depression. Vitamin D supplements have been prescribed, and efforts to increase sun exposure are made. A high intake of cheese is maintained, and adequate hydration is ensured.       PH  Increased dosing of TPM causes weight loss, so will avoid increasing the dose of TPM  Headaches are the same at 8/month. Previously had nurtec as abortive but has not received Nurtec since Jan 2024.     Taking Aimovig, TPM, propranolol, prozac and Nurtec  Days per month:15-18, migraines a month. Will have daily mild headache  Location: Occiput , Right Eye and Left Eye, mild headaches are occipital  Quality: Sharp and Throbbing        Duration: 2 hours  Severity: 7/10.  Triggers: dehydration, cycles, disrupted sleep  Associated Symptoms: Nausea, Photophobia, Phonophobia, Dizziness and  Vision changes difficult to read on white paper  Aura: loss of central vision, gold mccarty on periphery, 20 minutes later pain will start     Ocular Migraines  Will have 1-2 ocular migraines a month. Will have same aura and vision loss with no pain, duration 30 min     Hemiplegic Migraine  2 years ago 5/20/20 while pregnant had episode of migraine seen in ED. Had parethesia bilat hand and arms, right sided weakness, confusion for 30 minutes memory loss, felt like she couldn't speak. Seen in ED had MRI and EKG. Treated with IVF. Paresthesia lasted all day. Memory loss also returned. Treated with mag and riboflavin        Abortives: imitrex, nurtec  Preventives: beta blocker, Aimovig, Qulipta, TPM, elavil, Nurtec, OXC     Does not feel the adderall triggers her headaches.                                                                             PH    Onset as a teenager. Increased frequency in last 6  years.  Could be related to stress with 4 kids and she works as a behavior therapist w autism.  2 of her children has autism as well. Full -time student for master's  MRI Brain Without Contrast (05/08/2020 15:55)-nml     Weight loss   Pt has difficult time maintaining her weight. Caution with TPM     Fatigue   Reports feeling exhausted all the time and not feeling rested upon waking. Sometimes has difficulty sleeping despite being tired.              PMH: ADD, covid infection 9/2021.  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  w vasectomy.  Subjective      Past Medical History:   Past Medical History:   Diagnosis Date    ADHD     Anxiety     Depression     Headache, tension-type     Migraine     2-3 migraines/week. Often with aura. No relief with previous medication.    Vision loss     With migraines       Past Surgical History:   Past Surgical History:   Procedure Laterality Date    APPENDECTOMY      DILATATION AND CURETTAGE      ULNAR NERVE TRANSPOSITION      WISDOM TOOTH EXTRACTION         Family History:   Family History   Problem Relation Age of Onset    Lung cancer Paternal Grandfather         smoker    Hypertension Father     Diabetes Maternal Grandfather     Kidney disease Paternal Grandmother     Heart disease Paternal Grandmother     Stroke Paternal Grandmother     Dementia Paternal Grandmother     Dementia Maternal Grandmother        Social History:   Social History     Socioeconomic History    Marital status:    Tobacco Use    Smoking status: Never     Passive exposure: Never    Smokeless tobacco: Never   Vaping Use    Vaping status: Never Used   Substance and Sexual Activity    Alcohol use: Not Currently     Alcohol/week: 1.0 standard drink of alcohol     Types: 1 Glasses of wine per week     Comment: Per month    Drug use: No    Sexual activity: Defer     Partners: Male     Birth control/protection: Vasectomy       Medications:     Current Outpatient Medications:      Atogepant (Qulipta) 60 MG tablet, Take 1 tablet by mouth Daily., Disp: 30 tablet, Rfl: 11    FLUoxetine (PROzac) 40 MG capsule, Take 1 capsule by mouth Daily., Disp: 90 capsule, Rfl: 3    folic acid (FOLVITE) 1 MG tablet, Take 1 tablet by mouth Daily., Disp: 90 tablet, Rfl: 3    lisdexamfetamine (Vyvanse) 30 MG capsule, Take 1 capsule by mouth Every Morning, Disp: 30 capsule, Rfl: 0    OXcarbazepine (TRILEPTAL) 150 MG tablet, Take 1 tablet by mouth 2 (Two) Times a Day., Disp: 60 tablet, Rfl: 5    propranolol (INDERAL) 10 MG tablet, Take 1 tablet by mouth 3 (Three) Times a Day As Needed for Anxiety, Disp: 270 tablet, Rfl: 3    rimegepant sulfate ODT (Nurtec) 75 MG disintegrating tablet, Take 1 tablet by mouth Daily As Needed for migraines. Take 1 tablet at the onset of headache, Max of 75 mg/24 hours, Max of 18 tabs/30 days., Disp: 8 tablet, Rfl: 11    vitamin D (ERGOCALCIFEROL) 1.25 MG (56851 UT) capsule capsule, Take 1 capsule by mouth 1 (One) Time Per Week., Disp: 12 capsule, Rfl: 3    Allergies:   Allergies   Allergen Reactions    Penicillins Rash       PHQ-9 Total Score:     [facility] Fall Risk Assessment has not been completed.    Objective     Physical Exam:   Physical Exam  Eyes:      Extraocular Movements: No nystagmus.   Neurological:      Motor: Motor strength is normal.     Coordination: Coordination is intact.      Deep Tendon Reflexes:      Reflex Scores:       Bicep reflexes are 2+ on the right side and 2+ on the left side.       Brachioradialis reflexes are 2+ on the right side and 2+ on the left side.       Patellar reflexes are 2+ on the right side and 2+ on the left side.       Achilles reflexes are 2+ on the right side and 2+ on the left side.  Psychiatric:         Speech: Speech normal.         Neurological Exam  Mental Status  Awake, alert and oriented to person, place and time. Recent and remote memory are intact. Speech is normal. Follows complex commands. Attention and concentration are normal.  "Fund of knowledge is appropriate for level of education.    Cranial Nerves  CN III, IV, VI: No nystagmus. Normal saccades. Normal smooth pursuit.   Right pupil: Round.   Left pupil: Round.  CN V: Facial sensation is normal.  CN VII: Full and symmetric facial movement.    Motor  Normal muscle bulk throughout. No fasciculations present. Normal muscle tone. No abnormal involuntary movements. Strength is 5/5 throughout all four extremities.    Sensory  Sensation is intact to light touch, pinprick, vibration and proprioception in all four extremities.    Reflexes                                            Right                      Left  Brachioradialis                    2+                         2+  Biceps                                 2+                         2+  Patellar                                2+                         2+  Achilles                                2+                         2+    Coordination    Finger-to-nose, rapid alternating movements and heel-to-shin normal bilaterally without dysmetria.    Gait  Normal casual, toe, heel and tandem gait.     Physical Exam        Vital Signs:   Vitals:    07/25/25 1002   BP: 102/66   Pulse: 86   SpO2: 99%   Weight: 45.2 kg (99 lb 9.6 oz)   Height: 157.5 cm (62.01\")     Body mass index is 18.21 kg/m².         Assessment / Plan      Assessment/Plan:   Diagnoses and all orders for this visit:    1. Migraine with aura and without status migrainosus, not intractable (Primary)  Comments:  Start Botox. Cont oxc, qulipta, nurtec, propranolol    2. Other fatigue  -     Vitamin B12 & Folate; Future  -     Vitamin D,25-Hydroxy; Future    3. Vitamin D deficiency  -     Vitamin D,25-Hydroxy; Future     Pt with 15 or more headache days a month of moderate to severe intensity for > 6 months. Failed multiple meds. Ins changing in Aug to [facility].  Assessment & Plan  1. Headaches.  She reports persistent headaches despite current medications, including " beta blockers, Aimovig, Qulipta, Topamax, Elavil, Nurtec, and oxcarbazepine. She experiences 15 or more headache days a month with moderate to severe intensity. Botox therapy for migraines was discussed as a potential treatment option. The risks associated with Botox, including bleeding, pain, bruising, infection, and drooping eyebrow, were thoroughly explained. A message will be sent to the pharmacy group to initiate Botox therapy for her migraines. Her insurance is set to change on 08/01/2025, and she has been advised to provide a copy of her new insurance card for scanning into Epic before leaving today. The pharmacy team will contact her regarding the coverage of Botox by her insurance and any out-of-pocket expenses she may incur. If approved, Botox appointments will be scheduled every 3 months.    2. Fatigue.  She continues to experience fatigue. Previous labs indicated low vitamin D and folate levels. Labs will be drawn today to check her vitamin D, B12, and folate levels.    Follow-up  A follow-up appointment has been scheduled for 3 months from now.        Patient Education:       Reviewed medications, potential side effects and signs and symptoms to report. Discussed risk versus benefits of treatment plan with patient and/or family-including medications, labs and radiology that may be ordered. Addressed questions and concerns during visit. Patient and/or family verbalized understanding and agree with plan. Instructed to call the office with any questions and report to ER with any life-threatening symptoms.     Follow Up:   Return in about 3 months (around 10/25/2025) for Recheck.    During this visit the following were done:  Labs Reviewed [x]    Labs Ordered [x]    Radiology Reports Reviewed []    Radiology Ordered []    PCP Records Reviewed []    Referring Provider Records Reviewed []    ER Records Reviewed []    Hospital Records Reviewed []    History Obtained From Family []    Radiology Images Reviewed  []    Other Reviewed []    Records Requested []      Patient or patient representative verbalized consent for the use of Ambient Listening during the visit with  Francesca Frank DNP, APRN for chart documentation. 7/25/2025  08:31 EDT      Francesca Frank DNP, APRN

## 2025-08-01 ENCOUNTER — TELEPHONE (OUTPATIENT)
Dept: NEUROLOGY | Facility: CLINIC | Age: 35
End: 2025-08-01
Payer: COMMERCIAL

## 2025-08-01 NOTE — TELEPHONE ENCOUNTER
Patient called to schedule botox but was not approved under new insurance and will be scheduled after approval.    Called and left vm for patient to call back to schedule botox appointment.

## 2025-08-04 ENCOUNTER — TELEPHONE (OUTPATIENT)
Facility: HOSPITAL | Age: 35
End: 2025-08-04
Payer: COMMERCIAL

## 2025-08-11 ENCOUNTER — TELEPHONE (OUTPATIENT)
Facility: HOSPITAL | Age: 35
End: 2025-08-11
Payer: COMMERCIAL

## 2025-08-13 ENCOUNTER — DOCUMENTATION (OUTPATIENT)
Facility: HOSPITAL | Age: 35
End: 2025-08-13
Payer: COMMERCIAL

## 2025-08-22 ENCOUNTER — OFFICE VISIT (OUTPATIENT)
Dept: PSYCHIATRY | Facility: CLINIC | Age: 35
End: 2025-08-22
Payer: COMMERCIAL

## 2025-08-22 VITALS
HEART RATE: 78 BPM | DIASTOLIC BLOOD PRESSURE: 72 MMHG | SYSTOLIC BLOOD PRESSURE: 104 MMHG | WEIGHT: 104.4 LBS | HEIGHT: 62 IN | BODY MASS INDEX: 19.21 KG/M2 | OXYGEN SATURATION: 98 %

## 2025-08-22 DIAGNOSIS — F41.1 GENERALIZED ANXIETY DISORDER: Chronic | ICD-10-CM

## 2025-08-22 DIAGNOSIS — F33.0 MILD EPISODE OF RECURRENT MAJOR DEPRESSIVE DISORDER: Chronic | ICD-10-CM

## 2025-08-22 DIAGNOSIS — Z79.899 MEDICATION MANAGEMENT: ICD-10-CM

## 2025-08-22 DIAGNOSIS — F90.0 ADHD (ATTENTION DEFICIT HYPERACTIVITY DISORDER), INATTENTIVE TYPE: Primary | Chronic | ICD-10-CM

## 2025-08-22 PROCEDURE — 99214 OFFICE O/P EST MOD 30 MIN: CPT | Performed by: NURSE PRACTITIONER

## 2025-08-22 RX ORDER — LISDEXAMFETAMINE DIMESYLATE 30 MG/1
30 CAPSULE ORAL EVERY MORNING
Qty: 30 CAPSULE | Refills: 0 | Status: SHIPPED | OUTPATIENT
Start: 2025-08-22

## 2025-08-25 ENCOUNTER — TELEPHONE (OUTPATIENT)
Facility: HOSPITAL | Age: 35
End: 2025-08-25
Payer: COMMERCIAL